# Patient Record
Sex: FEMALE | Race: WHITE | NOT HISPANIC OR LATINO | Employment: FULL TIME | URBAN - NONMETROPOLITAN AREA
[De-identification: names, ages, dates, MRNs, and addresses within clinical notes are randomized per-mention and may not be internally consistent; named-entity substitution may affect disease eponyms.]

---

## 2020-12-29 LAB — HCV AB SER-ACNC: NEGATIVE

## 2021-10-08 DIAGNOSIS — N84.1 POLYP OF CERVIX UTERI: ICD-10-CM

## 2021-10-08 DIAGNOSIS — N93.9 ABNORMAL UTERINE AND VAGINAL BLEEDING, UNSPECIFIED: ICD-10-CM

## 2021-10-08 DIAGNOSIS — N92.0 EXCESSIVE AND FREQUENT MENSTRUATION WITH REGULAR CYCLE: ICD-10-CM

## 2021-10-09 ENCOUNTER — HOSPITAL ENCOUNTER (OUTPATIENT)
Dept: ULTRASOUND IMAGING | Facility: HOSPITAL | Age: 46
Discharge: HOME | End: 2021-10-09
Attending: OBSTETRICS & GYNECOLOGY
Payer: COMMERCIAL

## 2021-10-09 DIAGNOSIS — N93.9 ABNORMAL UTERINE AND VAGINAL BLEEDING, UNSPECIFIED: ICD-10-CM

## 2021-10-09 DIAGNOSIS — N84.1 POLYP OF CERVIX UTERI: ICD-10-CM

## 2021-10-09 DIAGNOSIS — N92.0 EXCESSIVE AND FREQUENT MENSTRUATION WITH REGULAR CYCLE: ICD-10-CM

## 2021-10-09 PROCEDURE — 76856 US EXAM PELVIC COMPLETE: CPT

## 2021-10-09 PROCEDURE — 76830 TRANSVAGINAL US NON-OB: CPT

## 2021-10-27 RX ORDER — SUMATRIPTAN 5 MG/1
1 SPRAY NASAL EVERY 2 HOUR PRN
COMMUNITY

## 2021-10-27 RX ORDER — DROSPIRENONE 4 MG/1
TABLET, FILM COATED ORAL DAILY
COMMUNITY

## 2021-10-27 RX ORDER — TRIAMTERENE AND HYDROCHLOROTHIAZIDE 37.5; 25 MG/1; MG/1
1 TABLET ORAL DAILY
COMMUNITY

## 2021-10-27 RX ORDER — COVID-19 ANTIGEN TEST
1 KIT MISCELLANEOUS AS NEEDED
COMMUNITY

## 2021-10-27 RX ORDER — VENLAFAXINE HYDROCHLORIDE 75 MG/1
75 CAPSULE, EXTENDED RELEASE ORAL DAILY
COMMUNITY
End: 2022-08-03

## 2021-10-27 RX ORDER — FENOFIBRATE 145 MG/1
145 TABLET, COATED ORAL DAILY
COMMUNITY

## 2021-10-27 RX ORDER — ATENOLOL 25 MG/1
25 TABLET ORAL 2 TIMES DAILY
COMMUNITY

## 2021-10-27 RX ORDER — VALACYCLOVIR HYDROCHLORIDE 1 G/1
1000 TABLET, FILM COATED ORAL AS NEEDED
COMMUNITY

## 2021-11-04 ENCOUNTER — ANESTHESIA EVENT (OUTPATIENT)
Dept: PERIOP | Facility: HOSPITAL | Age: 46
End: 2021-11-04
Payer: COMMERCIAL

## 2021-11-05 ENCOUNTER — ANESTHESIA (OUTPATIENT)
Dept: PERIOP | Facility: HOSPITAL | Age: 46
End: 2021-11-05
Payer: COMMERCIAL

## 2021-11-05 ENCOUNTER — HOSPITAL ENCOUNTER (OUTPATIENT)
Facility: HOSPITAL | Age: 46
Setting detail: OUTPATIENT SURGERY
Discharge: HOME | End: 2021-11-05
Attending: OBSTETRICS & GYNECOLOGY | Admitting: OBSTETRICS & GYNECOLOGY
Payer: COMMERCIAL

## 2021-11-05 VITALS
OXYGEN SATURATION: 98 % | SYSTOLIC BLOOD PRESSURE: 152 MMHG | DIASTOLIC BLOOD PRESSURE: 72 MMHG | HEART RATE: 70 BPM | TEMPERATURE: 98.3 F | BODY MASS INDEX: 27.42 KG/M2 | WEIGHT: 149 LBS | RESPIRATION RATE: 18 BRPM | HEIGHT: 62 IN

## 2021-11-05 DIAGNOSIS — N93.9 ABNORMAL UTERINE AND VAGINAL BLEEDING, UNSPECIFIED: ICD-10-CM

## 2021-11-05 PROBLEM — R11.2 PONV (POSTOPERATIVE NAUSEA AND VOMITING): Status: ACTIVE | Noted: 2021-11-05

## 2021-11-05 PROBLEM — I10 HTN (HYPERTENSION): Status: ACTIVE | Noted: 2021-11-05

## 2021-11-05 PROBLEM — R73.03 PREDIABETES: Status: ACTIVE | Noted: 2021-11-05

## 2021-11-05 PROBLEM — Z98.890 PONV (POSTOPERATIVE NAUSEA AND VOMITING): Status: ACTIVE | Noted: 2021-11-05

## 2021-11-05 PROBLEM — E78.5 HYPERLIPIDEMIA: Status: ACTIVE | Noted: 2021-11-05

## 2021-11-05 LAB
EXT PREGNANCY TEST URINE: NEGATIVE
EXT. CONTROL: NORMAL
HCG SERPL QL: NEGATIVE

## 2021-11-05 PROCEDURE — 84703 CHORIONIC GONADOTROPIN ASSAY: CPT | Performed by: OBSTETRICS & GYNECOLOGY

## 2021-11-05 PROCEDURE — 81025 URINE PREGNANCY TEST: CPT | Performed by: OBSTETRICS & GYNECOLOGY

## 2021-11-05 PROCEDURE — 88305 TISSUE EXAM BY PATHOLOGIST: CPT | Performed by: PATHOLOGY

## 2021-11-05 RX ORDER — SODIUM CHLORIDE 9 MG/ML
125 INJECTION, SOLUTION INTRAVENOUS CONTINUOUS
Status: DISCONTINUED | OUTPATIENT
Start: 2021-11-05 | End: 2021-11-07 | Stop reason: HOSPADM

## 2021-11-05 RX ORDER — PROPOFOL 10 MG/ML
INJECTION, EMULSION INTRAVENOUS AS NEEDED
Status: DISCONTINUED | OUTPATIENT
Start: 2021-11-05 | End: 2021-11-05

## 2021-11-05 RX ORDER — FENTANYL CITRATE/PF 50 MCG/ML
25 SYRINGE (ML) INJECTION
Status: DISCONTINUED | OUTPATIENT
Start: 2021-11-05 | End: 2021-11-05 | Stop reason: HOSPADM

## 2021-11-05 RX ORDER — MIDAZOLAM HYDROCHLORIDE 2 MG/2ML
INJECTION, SOLUTION INTRAMUSCULAR; INTRAVENOUS AS NEEDED
Status: DISCONTINUED | OUTPATIENT
Start: 2021-11-05 | End: 2021-11-05

## 2021-11-05 RX ORDER — LIDOCAINE HYDROCHLORIDE 10 MG/ML
INJECTION, SOLUTION EPIDURAL; INFILTRATION; INTRACAUDAL; PERINEURAL AS NEEDED
Status: DISCONTINUED | OUTPATIENT
Start: 2021-11-05 | End: 2021-11-05

## 2021-11-05 RX ORDER — KETOROLAC TROMETHAMINE 30 MG/ML
INJECTION, SOLUTION INTRAMUSCULAR; INTRAVENOUS AS NEEDED
Status: DISCONTINUED | OUTPATIENT
Start: 2021-11-05 | End: 2021-11-05

## 2021-11-05 RX ORDER — EPHEDRINE SULFATE 50 MG/ML
INJECTION INTRAVENOUS AS NEEDED
Status: DISCONTINUED | OUTPATIENT
Start: 2021-11-05 | End: 2021-11-05

## 2021-11-05 RX ORDER — ONDANSETRON 2 MG/ML
4 INJECTION INTRAMUSCULAR; INTRAVENOUS EVERY 6 HOURS PRN
Status: DISCONTINUED | OUTPATIENT
Start: 2021-11-05 | End: 2021-11-07 | Stop reason: HOSPADM

## 2021-11-05 RX ORDER — ONDANSETRON 2 MG/ML
INJECTION INTRAMUSCULAR; INTRAVENOUS AS NEEDED
Status: DISCONTINUED | OUTPATIENT
Start: 2021-11-05 | End: 2021-11-05

## 2021-11-05 RX ORDER — IBUPROFEN 600 MG/1
600 TABLET ORAL EVERY 6 HOURS PRN
Status: DISCONTINUED | OUTPATIENT
Start: 2021-11-05 | End: 2021-11-07 | Stop reason: HOSPADM

## 2021-11-05 RX ORDER — DEXAMETHASONE SODIUM PHOSPHATE 10 MG/ML
INJECTION, SOLUTION INTRAMUSCULAR; INTRAVENOUS AS NEEDED
Status: DISCONTINUED | OUTPATIENT
Start: 2021-11-05 | End: 2021-11-05

## 2021-11-05 RX ORDER — MAGNESIUM HYDROXIDE 1200 MG/15ML
LIQUID ORAL AS NEEDED
Status: DISCONTINUED | OUTPATIENT
Start: 2021-11-05 | End: 2021-11-05 | Stop reason: HOSPADM

## 2021-11-05 RX ORDER — CEFAZOLIN SODIUM 2 G/50ML
2000 SOLUTION INTRAVENOUS ONCE
Status: COMPLETED | OUTPATIENT
Start: 2021-11-05 | End: 2021-11-05

## 2021-11-05 RX ORDER — FENTANYL CITRATE 50 UG/ML
INJECTION, SOLUTION INTRAMUSCULAR; INTRAVENOUS AS NEEDED
Status: DISCONTINUED | OUTPATIENT
Start: 2021-11-05 | End: 2021-11-05

## 2021-11-05 RX ORDER — MEPERIDINE HYDROCHLORIDE 25 MG/ML
25 INJECTION INTRAMUSCULAR; INTRAVENOUS; SUBCUTANEOUS ONCE AS NEEDED
Status: DISCONTINUED | OUTPATIENT
Start: 2021-11-05 | End: 2021-11-05 | Stop reason: HOSPADM

## 2021-11-05 RX ADMIN — FENTANYL CITRATE 50 MCG: 50 INJECTION, SOLUTION INTRAMUSCULAR; INTRAVENOUS at 09:00

## 2021-11-05 RX ADMIN — DEXAMETHASONE SODIUM PHOSPHATE 4 MG: 10 INJECTION, SOLUTION INTRAMUSCULAR; INTRAVENOUS at 09:00

## 2021-11-05 RX ADMIN — EPHEDRINE SULFATE 15 MG: 50 INJECTION, SOLUTION INTRAVENOUS at 09:14

## 2021-11-05 RX ADMIN — SODIUM CHLORIDE 125 ML/HR: 0.9 INJECTION, SOLUTION INTRAVENOUS at 07:21

## 2021-11-05 RX ADMIN — EPHEDRINE SULFATE 10 MG: 50 INJECTION, SOLUTION INTRAVENOUS at 09:07

## 2021-11-05 RX ADMIN — LIDOCAINE HYDROCHLORIDE 50 MG: 10 INJECTION, SOLUTION EPIDURAL; INFILTRATION; INTRACAUDAL; PERINEURAL at 09:00

## 2021-11-05 RX ADMIN — ONDANSETRON 4 MG: 2 INJECTION INTRAMUSCULAR; INTRAVENOUS at 09:00

## 2021-11-05 RX ADMIN — PROPOFOL 180 MG: 10 INJECTION, EMULSION INTRAVENOUS at 09:00

## 2021-11-05 RX ADMIN — MIDAZOLAM HYDROCHLORIDE 2 MG: 1 INJECTION, SOLUTION INTRAMUSCULAR; INTRAVENOUS at 08:51

## 2021-11-05 RX ADMIN — KETOROLAC TROMETHAMINE 30 MG: 30 INJECTION, SOLUTION INTRAMUSCULAR at 09:23

## 2021-11-05 RX ADMIN — EPHEDRINE SULFATE 10 MG: 50 INJECTION, SOLUTION INTRAVENOUS at 09:05

## 2021-11-05 RX ADMIN — FENTANYL CITRATE 50 MCG: 50 INJECTION, SOLUTION INTRAMUSCULAR; INTRAVENOUS at 09:18

## 2021-11-05 RX ADMIN — CEFAZOLIN SODIUM 2000 MG: 2 SOLUTION INTRAVENOUS at 08:50

## 2022-07-25 ENCOUNTER — ANESTHESIA EVENT (OUTPATIENT)
Dept: PERIOP | Facility: HOSPITAL | Age: 47
End: 2022-07-25
Payer: COMMERCIAL

## 2022-07-27 RX ORDER — HYDROCHLOROTHIAZIDE 25 MG/1
25 TABLET ORAL DAILY
COMMUNITY

## 2022-07-27 RX ORDER — TOPIRAMATE 50 MG/1
50 TABLET, FILM COATED ORAL EVERY 12 HOURS SCHEDULED
COMMUNITY

## 2022-07-27 RX ORDER — FLUTICASONE PROPIONATE 50 MCG
1 SPRAY, SUSPENSION (ML) NASAL DAILY
COMMUNITY

## 2022-07-27 RX ORDER — LISINOPRIL 40 MG/1
40 TABLET ORAL DAILY
COMMUNITY

## 2022-07-27 RX ORDER — AMLODIPINE BESYLATE 5 MG/1
5 TABLET ORAL DAILY
COMMUNITY

## 2022-07-27 NOTE — PRE-PROCEDURE INSTRUCTIONS
Pre-Surgery Instructions:   Medication Instructions    amLODIPine (NORVASC) 5 mg tablet Take day of surgery   atenolol (TENORMIN) 25 mg tablet Take day of surgery   Calcium-Magnesium-Vitamin D (CALCIUM 1200+D3 PO) Stop taking 7 days prior to surgery   fenofibrate (TRICOR) 145 mg tablet Take day of surgery   fluticasone (FLONASE) 50 mcg/act nasal spray Take day of surgery   hydrochlorothiazide (HYDRODIURIL) 25 mg tablet Hold day of surgery   lisinopril (ZESTRIL) 40 mg tablet Hold day of surgery   Multiple Vitamins-Minerals (MULTIVITAMIN WOMEN PO) Stop taking 7 days prior to surgery   Naproxen Sodium 220 MG CAPS Stop taking 3 days prior to surgery   SUMAtriptan (IMITREX) 5 MG/ACT nasal spray Uses PRN- OK to take day of surgery    topiramate (TOPAMAX) 50 MG tablet Take day of surgery   valACYclovir (VALTREX) 1,000 mg tablet Uses PRN- OK to take day of surgery   See above    Pt was instructed to take am meds with a small sip of water    Tylenol ok for pain prn

## 2022-08-02 NOTE — H&P
HISTORY AND PHYSICAL       Subjective      Ankur Fleming is a 55year old female  Chief Complaint   Patient presents with    Consultation            HPI:     This is a 12-year-old  011 presents the office today for consultation  Patient has a history of D&C, hysteroscopy, polypectomy secondary to abnormal uterine bleeding  Since the procedure, her periods have been irregular  And have increased with the heaviness    Also having a lot of cramps           PMH:          Past Medical History:   Diagnosis Date    Chronic diarrhea      Glucose intolerance (impaired glucose tolerance)      HTN, goal below 130/80              Past Surgical History:   Procedure Laterality Date     DELIVERY       COLONOSCOPY, DIAGNOSTIC (RECTUM) N/A 2018     COLONOSCOPY FLEXIBLE PROXIMAL DIAGNOSTIC performed by Mila Lombard, MD at [address] [place] [address], EACH, REPAIR Right 2016     OPEN TREATMENT ARTICULAR FINGER FRACTURE performed by Michael Osorio MD at [address] [place] [address] NAIL BED, PERMANENT REMOVAL Right 2016     EXCISION NAIL PARTIAL OR COMPLETE INGROWN performed by Michael Osorio MD at [place] [address]        Shoulder Surgery, right - scope            Family History   Problem Relation Age of Onset    Cancer Aunt (Unspecified)           maternal - breast    Breast Cancer Aunt (Unspecified)      Cancer Grandmother (Paternal)            stomach    Cancer Grandfather (Maternal)           leukemia    Cancer Aunt (Unspecified)           paternal - pancreatic    Cancer Uncle (Unspecified)           paternal - pancreatic    Heart failure Mother      Diabetes Mother      Stroke Father      Diabetes Father      Diabetes Grandmother (Maternal)      Stroke Grandmother (Maternal)      No Known Problems Grandfather (Paternal)      Ovarian cancer Cousin (Maternal)                  Current Outpatient Medications   Medication Sig Dispense Refill    Calcium Carb-Cholecalciferol (CALCIUM + D3) 600-200 MG-UNIT per tablet Take 1 Tab by mouth 2 times a day         fluticasone (FLONASE ALLERGY RELIEF) 50 MCG/ACT nasal spray Administer 1 Spray into nostril as needed         Multiple Vitamins-Calcium (ONE-A-DAY WOMENS FORMULA) Tablet Take 1 Tab by mouth daily         Glucose Blood (ONETOUCH VERIO) STRP Use to check blood sugar as needed  Dx R73 03 100 Strip 11    Fenofibrate 145 MG Oral Tablet (Tricor) Take 1 Tab by mouth daily  90 Tab 3    Triamterene-HCTZ 37 5-25 MG Oral Tablet ((Maxzide-25)) Take 1 Tab by mouth daily  90 Tab 3    Atenolol 50 MG Oral Tablet (Tenormin) Take 0 5 Tabs by mouth 2 times a day  Indications: 1/2 tab twice daily 90 Tab 3    SUMAtriptan 5 MG/ACT Nasal Solution (Imitrex) USE 2 SPRAYS IN NOSTRIL AT ONSET OF HEADACHE  MAY REPEAT IN 2 HOURS IF NECESSARY 12 Each 19    valACYclovir HCl 1 GM Oral Tablet (Valtrex) TAKE 2 TABLETS EVERY 12 HOURS FOR ONE DAY AT THE ONSET OF A COLD SORE 12 Tab 20    Venlafaxine HCl ER 75 MG Oral Capsule Extended Release 24 Hour (Effexor XR) TAKE 1 CAPSULE DAILY  START ONCE 37 5 MG CAPSULE ARE COMPLETED 90 Capsule 3      No current facility-administered medications for this visit               Review of patient's allergies indicates: Allergen Reactions    Latex Hives    Pneumococcal Vaccines           ROS:  Review of Systems   Constitutional: Negative for activity change, appetite change, fatigue and fever  HENT: Negative for congestion, facial swelling, hearing loss, sinus pressure and sinus pain  Eyes: Negative for discharge and itching  Respiratory: Negative for apnea and chest tightness  Cardiovascular: Negative for chest pain and leg swelling  Gastrointestinal: Negative for abdominal distention, abdominal pain and nausea  Endocrine: Negative for cold intolerance and heat intolerance     Genitourinary:  Positive for pain in bleeding  Musculoskeletal: Negative for arthralgias, back pain and gait problem  Skin: Negative for color change and pallor  Allergic/Immunologic: Negative for environmental allergies  Neurological: Negative for dizziness, facial asymmetry and numbness  Psychiatric/Behavioral: Negative for agitation, behavioral problems and suicidal ideas                   Objective      /84   Pulse 90   Temp 36 7 °C (98 °F) (Infrared )   Resp 20   Ht 1 524 m (5')   Wt 70 5 kg (155 lb 6 4 oz)   LMP 04/02/2022 (Approximate)   SpO2 98%   BMI 30 35 kg/m²   BSA 1 73 m²      Physical Exam:  Physical Exam  Constitutional:       General: She is not in acute distress  Appearance: She is not ill-appearing or toxic-appearing  HENT:      Head: Normocephalic and atraumatic  Nose: Nose normal       Mouth/Throat:      Mouth: Mucous membranes are moist      Eyes:      Conjunctiva/sclera: Conjunctivae normal       Pupils: Pupils are equal, round, and reactive to light  Abdominal:      Palpations: Abdomen is soft  Tenderness: There is no abdominal tenderness  There is no guarding       Genitourinary:     General: Normal vulva  Vagina: No vaginal discharge  External exam:  No rashes, lumps or lesions noted  Internal exam:   No cervical motion tenderness  Anteverted uterus  Negative for adnexal tenderness  No adnexal masses palpated  No vaginal bleeding noted  No abnormal discharge noted    Musculoskeletal:         General: No swelling or tenderness       Neurological:      Mental Status: She is alert and oriented to person, place, and time  Psychiatric:         Mood and Affect: Mood normal          Behavior: Behavior normal                ASSESSMENT/PLAN:  Cassondra Severin was seen today for consultation      Diagnoses and all orders for this visit:     Abnormal uterine bleeding (AUB)  -     CBC WITH WBC DIFFERENTIAL     1  Abnormal uterine bleeding  -patient did not have any improvement from the hysteroscopy, D&C, polypectomy  -options were discussed    From conservative management such as birth control, Mirena IUD versus surgical management  -risks, benefits and alternatives were discussed with the patient  Ultimately opts for definitive management with a hysterectomy  -all questions were answered  Consents were signed   -patient will be for diagnostic laparoscopy, robotic assisted total hysterectomy, bilateral salpingectomy, possible open  -will schedule at [address]

## 2022-08-03 ENCOUNTER — ANESTHESIA (OUTPATIENT)
Dept: PERIOP | Facility: HOSPITAL | Age: 47
End: 2022-08-03
Payer: COMMERCIAL

## 2022-08-03 ENCOUNTER — HOSPITAL ENCOUNTER (OUTPATIENT)
Facility: HOSPITAL | Age: 47
Setting detail: OUTPATIENT SURGERY
Discharge: HOME | End: 2022-08-03
Attending: OBSTETRICS & GYNECOLOGY | Admitting: OBSTETRICS & GYNECOLOGY
Payer: COMMERCIAL

## 2022-08-03 VITALS
HEART RATE: 81 BPM | DIASTOLIC BLOOD PRESSURE: 63 MMHG | SYSTOLIC BLOOD PRESSURE: 115 MMHG | TEMPERATURE: 97.6 F | WEIGHT: 149 LBS | RESPIRATION RATE: 18 BRPM | HEIGHT: 62 IN | BODY MASS INDEX: 27.42 KG/M2 | OXYGEN SATURATION: 100 %

## 2022-08-03 DIAGNOSIS — N93.9 ABNORMAL UTERINE AND VAGINAL BLEEDING, UNSPECIFIED: ICD-10-CM

## 2022-08-03 PROBLEM — R51.9 HEADACHE: Status: ACTIVE | Noted: 2022-08-03

## 2022-08-03 LAB
ABO GROUP BLD: NORMAL
ABO GROUP BLD: NORMAL
BLD GP AB SCN SERPL QL: NEGATIVE
EXT PREGNANCY TEST URINE: NEGATIVE
EXT. CONTROL: NORMAL
RH BLD: POSITIVE
RH BLD: POSITIVE
SPECIMEN EXPIRATION DATE: NORMAL

## 2022-08-03 PROCEDURE — 88342 IMHCHEM/IMCYTCHM 1ST ANTB: CPT | Performed by: PATHOLOGY

## 2022-08-03 PROCEDURE — 88307 TISSUE EXAM BY PATHOLOGIST: CPT | Performed by: PATHOLOGY

## 2022-08-03 PROCEDURE — 81025 URINE PREGNANCY TEST: CPT | Performed by: OBSTETRICS & GYNECOLOGY

## 2022-08-03 PROCEDURE — 86900 BLOOD TYPING SEROLOGIC ABO: CPT | Performed by: ANESTHESIOLOGY

## 2022-08-03 PROCEDURE — 86850 RBC ANTIBODY SCREEN: CPT | Performed by: ANESTHESIOLOGY

## 2022-08-03 PROCEDURE — 86901 BLOOD TYPING SEROLOGIC RH(D): CPT | Performed by: ANESTHESIOLOGY

## 2022-08-03 PROCEDURE — 88341 IMHCHEM/IMCYTCHM EA ADD ANTB: CPT | Performed by: PATHOLOGY

## 2022-08-03 PROCEDURE — 58571 TLH W/T/O 250 G OR LESS: CPT

## 2022-08-03 RX ORDER — BUPIVACAINE HYDROCHLORIDE 2.5 MG/ML
INJECTION, SOLUTION EPIDURAL; INFILTRATION; INTRACAUDAL AS NEEDED
Status: DISCONTINUED | OUTPATIENT
Start: 2022-08-03 | End: 2022-08-03 | Stop reason: HOSPADM

## 2022-08-03 RX ORDER — PROPOFOL 10 MG/ML
INJECTION, EMULSION INTRAVENOUS AS NEEDED
Status: DISCONTINUED | OUTPATIENT
Start: 2022-08-03 | End: 2022-08-03

## 2022-08-03 RX ORDER — ROCURONIUM BROMIDE 10 MG/ML
INJECTION, SOLUTION INTRAVENOUS AS NEEDED
Status: DISCONTINUED | OUTPATIENT
Start: 2022-08-03 | End: 2022-08-03

## 2022-08-03 RX ORDER — KETOROLAC TROMETHAMINE 30 MG/ML
INJECTION, SOLUTION INTRAMUSCULAR; INTRAVENOUS AS NEEDED
Status: DISCONTINUED | OUTPATIENT
Start: 2022-08-03 | End: 2022-08-03

## 2022-08-03 RX ORDER — SODIUM CHLORIDE 9 MG/ML
125 INJECTION, SOLUTION INTRAVENOUS CONTINUOUS
Status: DISCONTINUED | OUTPATIENT
Start: 2022-08-03 | End: 2022-08-03 | Stop reason: HOSPADM

## 2022-08-03 RX ORDER — NEOSTIGMINE METHYLSULFATE 1 MG/ML
INJECTION INTRAVENOUS AS NEEDED
Status: DISCONTINUED | OUTPATIENT
Start: 2022-08-03 | End: 2022-08-03

## 2022-08-03 RX ORDER — METOCLOPRAMIDE HYDROCHLORIDE 5 MG/ML
10 INJECTION INTRAMUSCULAR; INTRAVENOUS ONCE
Status: COMPLETED | OUTPATIENT
Start: 2022-08-03 | End: 2022-08-03

## 2022-08-03 RX ORDER — CEFAZOLIN SODIUM 1 G/3ML
INJECTION, POWDER, FOR SOLUTION INTRAMUSCULAR; INTRAVENOUS AS NEEDED
Status: DISCONTINUED | OUTPATIENT
Start: 2022-08-03 | End: 2022-08-03

## 2022-08-03 RX ORDER — FENTANYL CITRATE 50 UG/ML
INJECTION, SOLUTION INTRAMUSCULAR; INTRAVENOUS AS NEEDED
Status: DISCONTINUED | OUTPATIENT
Start: 2022-08-03 | End: 2022-08-03

## 2022-08-03 RX ORDER — ONDANSETRON 2 MG/ML
4 INJECTION INTRAMUSCULAR; INTRAVENOUS ONCE AS NEEDED
Status: COMPLETED | OUTPATIENT
Start: 2022-08-03 | End: 2022-08-03

## 2022-08-03 RX ORDER — FENTANYL CITRATE/PF 50 MCG/ML
50 SYRINGE (ML) INJECTION
Status: DISCONTINUED | OUTPATIENT
Start: 2022-08-03 | End: 2022-08-03 | Stop reason: HOSPADM

## 2022-08-03 RX ORDER — MAGNESIUM HYDROXIDE 1200 MG/15ML
LIQUID ORAL AS NEEDED
Status: DISCONTINUED | OUTPATIENT
Start: 2022-08-03 | End: 2022-08-03 | Stop reason: HOSPADM

## 2022-08-03 RX ORDER — DIPHENHYDRAMINE HYDROCHLORIDE 50 MG/ML
12.5 INJECTION INTRAMUSCULAR; INTRAVENOUS ONCE
Status: COMPLETED | OUTPATIENT
Start: 2022-08-03 | End: 2022-08-03

## 2022-08-03 RX ORDER — SODIUM CHLORIDE, SODIUM LACTATE, POTASSIUM CHLORIDE, CALCIUM CHLORIDE 600; 310; 30; 20 MG/100ML; MG/100ML; MG/100ML; MG/100ML
INJECTION, SOLUTION INTRAVENOUS CONTINUOUS PRN
Status: DISCONTINUED | OUTPATIENT
Start: 2022-08-03 | End: 2022-08-03

## 2022-08-03 RX ORDER — IBUPROFEN 600 MG/1
600 TABLET ORAL EVERY 6 HOURS PRN
Status: DISCONTINUED | OUTPATIENT
Start: 2022-08-03 | End: 2022-08-03 | Stop reason: HOSPADM

## 2022-08-03 RX ORDER — GLYCOPYRROLATE 0.2 MG/ML
INJECTION INTRAMUSCULAR; INTRAVENOUS AS NEEDED
Status: DISCONTINUED | OUTPATIENT
Start: 2022-08-03 | End: 2022-08-03

## 2022-08-03 RX ORDER — ONDANSETRON 2 MG/ML
4 INJECTION INTRAMUSCULAR; INTRAVENOUS EVERY 6 HOURS PRN
Status: DISCONTINUED | OUTPATIENT
Start: 2022-08-03 | End: 2022-08-03 | Stop reason: HOSPADM

## 2022-08-03 RX ORDER — METRONIDAZOLE 500 MG/100ML
500 INJECTION, SOLUTION INTRAVENOUS EVERY 8 HOURS
Status: DISCONTINUED | OUTPATIENT
Start: 2022-08-03 | End: 2022-08-03

## 2022-08-03 RX ORDER — SODIUM CHLORIDE, SODIUM LACTATE, POTASSIUM CHLORIDE, CALCIUM CHLORIDE 600; 310; 30; 20 MG/100ML; MG/100ML; MG/100ML; MG/100ML
100 INJECTION, SOLUTION INTRAVENOUS CONTINUOUS
Status: DISCONTINUED | OUTPATIENT
Start: 2022-08-03 | End: 2022-08-03 | Stop reason: HOSPADM

## 2022-08-03 RX ORDER — HYDROMORPHONE HCL IN WATER/PF 6 MG/30 ML
0.2 PATIENT CONTROLLED ANALGESIA SYRINGE INTRAVENOUS
Status: DISCONTINUED | OUTPATIENT
Start: 2022-08-03 | End: 2022-08-03 | Stop reason: HOSPADM

## 2022-08-03 RX ORDER — DEXAMETHASONE SODIUM PHOSPHATE 10 MG/ML
INJECTION, SOLUTION INTRAMUSCULAR; INTRAVENOUS AS NEEDED
Status: DISCONTINUED | OUTPATIENT
Start: 2022-08-03 | End: 2022-08-03

## 2022-08-03 RX ORDER — DIPHENHYDRAMINE HYDROCHLORIDE 50 MG/ML
INJECTION INTRAMUSCULAR; INTRAVENOUS
Status: COMPLETED
Start: 2022-08-03 | End: 2022-08-03

## 2022-08-03 RX ORDER — PROPOFOL 10 MG/ML
INJECTION, EMULSION INTRAVENOUS CONTINUOUS PRN
Status: DISCONTINUED | OUTPATIENT
Start: 2022-08-03 | End: 2022-08-03

## 2022-08-03 RX ORDER — ONDANSETRON 2 MG/ML
INJECTION INTRAMUSCULAR; INTRAVENOUS AS NEEDED
Status: DISCONTINUED | OUTPATIENT
Start: 2022-08-03 | End: 2022-08-03

## 2022-08-03 RX ORDER — ACETAMINOPHEN 325 MG/1
975 TABLET ORAL EVERY 6 HOURS PRN
Status: DISCONTINUED | OUTPATIENT
Start: 2022-08-03 | End: 2022-08-03 | Stop reason: HOSPADM

## 2022-08-03 RX ORDER — MIDAZOLAM HYDROCHLORIDE 2 MG/2ML
INJECTION, SOLUTION INTRAMUSCULAR; INTRAVENOUS AS NEEDED
Status: DISCONTINUED | OUTPATIENT
Start: 2022-08-03 | End: 2022-08-03

## 2022-08-03 RX ORDER — CEFAZOLIN SODIUM 2 G/50ML
2000 SOLUTION INTRAVENOUS ONCE
Status: COMPLETED | OUTPATIENT
Start: 2022-08-03 | End: 2022-08-03

## 2022-08-03 RX ADMIN — FENTANYL CITRATE 50 MCG: 0.05 INJECTION, SOLUTION INTRAMUSCULAR; INTRAVENOUS at 08:25

## 2022-08-03 RX ADMIN — FENTANYL CITRATE 25 MCG: 0.05 INJECTION, SOLUTION INTRAMUSCULAR; INTRAVENOUS at 12:00

## 2022-08-03 RX ADMIN — DIPHENHYDRAMINE HYDROCHLORIDE 12.5 MG: 50 INJECTION INTRAMUSCULAR; INTRAVENOUS at 12:51

## 2022-08-03 RX ADMIN — METOCLOPRAMIDE HYDROCHLORIDE 10 MG: 5 INJECTION INTRAMUSCULAR; INTRAVENOUS at 13:48

## 2022-08-03 RX ADMIN — SODIUM CHLORIDE, SODIUM LACTATE, POTASSIUM CHLORIDE, AND CALCIUM CHLORIDE: .6; .31; .03; .02 INJECTION, SOLUTION INTRAVENOUS at 07:35

## 2022-08-03 RX ADMIN — FENTANYL CITRATE 25 MCG: 0.05 INJECTION, SOLUTION INTRAMUSCULAR; INTRAVENOUS at 11:29

## 2022-08-03 RX ADMIN — MIDAZOLAM 2 MG: 1 INJECTION INTRAMUSCULAR; INTRAVENOUS at 07:27

## 2022-08-03 RX ADMIN — GLYCOPYRROLATE 0.4 MG: 0.2 INJECTION, SOLUTION INTRAMUSCULAR; INTRAVENOUS at 11:34

## 2022-08-03 RX ADMIN — DIPHENHYDRAMINE HYDROCHLORIDE 12.5 MG: 50 INJECTION, SOLUTION INTRAMUSCULAR; INTRAVENOUS at 12:51

## 2022-08-03 RX ADMIN — ONDANSETRON 4 MG: 2 INJECTION INTRAMUSCULAR; INTRAVENOUS at 12:24

## 2022-08-03 RX ADMIN — PROPOFOL 200 MG: 10 INJECTION, EMULSION INTRAVENOUS at 07:30

## 2022-08-03 RX ADMIN — PROPOFOL 120 MCG/KG/MIN: 10 INJECTION, EMULSION INTRAVENOUS at 07:34

## 2022-08-03 RX ADMIN — KETOROLAC TROMETHAMINE 30 MG: 30 INJECTION, SOLUTION INTRAMUSCULAR at 11:32

## 2022-08-03 RX ADMIN — IBUPROFEN 600 MG: 600 TABLET ORAL at 14:39

## 2022-08-03 RX ADMIN — SODIUM CHLORIDE, SODIUM LACTATE, POTASSIUM CHLORIDE, CALCIUM CHLORIDE: 600; 310; 30; 20 INJECTION, SOLUTION INTRAVENOUS at 07:27

## 2022-08-03 RX ADMIN — ROCURONIUM BROMIDE 20 MG: 50 INJECTION, SOLUTION INTRAVENOUS at 08:31

## 2022-08-03 RX ADMIN — NEOSTIGMINE METHYLSULFATE 3 MG: 1 INJECTION, SOLUTION INTRAVENOUS at 11:34

## 2022-08-03 RX ADMIN — CEFAZOLIN 2000 MG: 1 INJECTION, POWDER, FOR SOLUTION INTRAMUSCULAR; INTRAVENOUS at 11:24

## 2022-08-03 RX ADMIN — FENTANYL CITRATE 50 MCG: 0.05 INJECTION, SOLUTION INTRAMUSCULAR; INTRAVENOUS at 09:56

## 2022-08-03 RX ADMIN — ONDANSETRON 4 MG: 2 INJECTION INTRAMUSCULAR; INTRAVENOUS at 10:47

## 2022-08-03 RX ADMIN — FENTANYL CITRATE 50 MCG: 0.05 INJECTION, SOLUTION INTRAMUSCULAR; INTRAVENOUS at 10:35

## 2022-08-03 RX ADMIN — ROCURONIUM BROMIDE 10 MG: 50 INJECTION, SOLUTION INTRAVENOUS at 09:56

## 2022-08-03 RX ADMIN — FENTANYL CITRATE 100 MCG: 0.05 INJECTION, SOLUTION INTRAMUSCULAR; INTRAVENOUS at 07:30

## 2022-08-03 RX ADMIN — METRONIDAZOLE: 500 INJECTION, SOLUTION INTRAVENOUS at 07:39

## 2022-08-03 RX ADMIN — CEFAZOLIN SODIUM 2000 MG: 2 SOLUTION INTRAVENOUS at 07:27

## 2022-08-03 RX ADMIN — ROCURONIUM BROMIDE 20 MG: 50 INJECTION, SOLUTION INTRAVENOUS at 09:16

## 2022-08-03 RX ADMIN — METHYLENE BLUE 50 MG: 5 INJECTION INTRAVENOUS at 10:21

## 2022-08-03 RX ADMIN — ROCURONIUM BROMIDE 50 MG: 50 INJECTION, SOLUTION INTRAVENOUS at 07:30

## 2022-08-03 RX ADMIN — ROCURONIUM BROMIDE 10 MG: 50 INJECTION, SOLUTION INTRAVENOUS at 10:30

## 2022-08-03 RX ADMIN — DEXAMETHASONE SODIUM PHOSPHATE 10 MG: 10 INJECTION, SOLUTION INTRAMUSCULAR; INTRAVENOUS at 07:55

## 2022-08-03 NOTE — DISCHARGE INSTRUCTIONS
Please take tylenol/ advil as needed   Percocet was sent to pharmacy  No sex for 8 weeks  Please schedule an appointment to see me in the office in 3 weeks  No lifting more than 30 lbs for 6 weeks

## 2022-08-03 NOTE — DISCHARGE SUMMARY
Discharge Summary - Juanis Early 55 y o  female MRN: [id number]    Unit/Bed#: OR POOL Encounter: [id number]    Admission Date:     Admitting Diagnosis: Abnormal uterine and vaginal bleeding, unspecified [N93 9]        Procedures Performed: diagnostic laparoscopy, robotic assisted total hysterectomy, b/l salpingectomy    Summary of Hospital Course: Patient was here for scheduled surgery  No issues and discharged home the same day    Significant Findings, Care, Treatment and Services Provided: none    Complications: none    Discharge Diagnosis: AUB    Medical Problems                   Condition at Discharge: good         Discharge instructions/Information to patient and family:   See after visit summary for information provided to patient and family  Provisions for Follow-Up Care:  See after visit summary for information related to follow-up care and any pertinent home health orders  PCP: Foreign Mcqueen DO    Disposition: Home    Planned Readmission: No      Discharge Statement   I spent 15 minutes discharging the patient  This time was spent on the day of discharge  I had direct contact with the patient on the day of discharge  Additional documentation is required if more than 30 minutes were spent on discharge  Discharge Medications:  See after visit summary for reconciled discharge medications provided to patient and family

## 2022-08-03 NOTE — ANESTHESIA POSTPROCEDURE EVALUATION
Post-Op Assessment Note    CV Status:  Stable    Pain management: satisfactory to patient     Mental Status:  Sleepy and arousable   Hydration Status:  Stable   PONV Controlled:  None   Airway Patency:  Patent  Airway: intubated      Post Op Vitals Reviewed: Yes      Staff: CRNA         No complications documented      /63 (08/03/22 1203)    Temp 98 3 °F (36 8 °C) (08/03/22 1203)    Pulse 80 (08/03/22 1203)   Resp 20 (08/03/22 1203)    SpO2 99 % (08/03/22 1203)

## 2022-08-03 NOTE — ANESTHESIA PREPROCEDURE EVALUATION
Procedure:  DIAGNOSTIC LAPAROSCOPY, ROBOTIC TOTAL LAPAROSCOPIC HYSTERECTOMY, BILATERAL SALPINGECTOMY, ANY OTHER INDICATED PROCEDURE (N/A Abdomen)  POSSIBLE OPEN (N/A Abdomen)    Relevant Problems   ANESTHESIA   (+) PONV (postoperative nausea and vomiting)      CARDIO   (+) HTN (hypertension)   (+) Hyperlipidemia      NEURO/PSYCH   (+) Headache        Physical Exam    Airway    Mallampati score: II  TM Distance: >3 FB  Neck ROM: full     Dental       Cardiovascular  Cardiovascular exam normal    Pulmonary  Pulmonary exam normal     Other Findings        Anesthesia Plan  ASA Score- 2     Anesthesia Type- general with ASA Monitors  Additional Monitors:   Airway Plan: ETT  Plan Factors-    Chart reviewed  Patient summary reviewed  Induction- intravenous  Postoperative Plan-     Informed Consent- Anesthetic plan and risks discussed with patient  I personally reviewed this patient with the CRNA  Discussed and agreed on the Anesthesia Plan with the CRNA  Kris Peacock

## 2022-08-03 NOTE — OP NOTE
OPERATIVE REPORT  PATIENT NAME: Juanis Early    :  1975  MRN: [id number]  Pt Location: [facility] OR ROOM 01    SURGERY DATE: 8/3/2022    Surgeon(s) and Role:      Durga Dudley DO - Primary      Keke Mclain PA-C - Assisting    Preop Diagnosis:  Abnormal uterine and vaginal bleeding, unspecified [N93 9]    Post-Op Diagnosis Codes:      Abnormal uterine and vaginal bleeding, unspecified [N93 9]    Procedure(s) (LRB):  DIAGNOSTIC LAPAROSCOPY, ROBOTIC LAPAROSCOPIC HYSTERECTOMY, BILATERAL SALPINGECTOMY (N/A)    Specimen(s):  ID Type Source Tests Collected by Time Destination   1 : uterus, bilateral fallopian tubes and cervix Tissue Uterus TISSUE EXAM Durga Dudley DO 8/3/2022 10:18 AM        Estimated Blood Loss:   60cc    Drains:  [REMOVED] Urethral Catheter Non-latex 16 Fr  (Removed)   Number of days: 0       Anesthesia Type:   General    Operative Indications:  Abnormal uterine and vaginal bleeding, unspecified [N93 9]      Operative Findings:  Normal uterus, tubes and ovaries  Uterus less than 419K    Complications:   None    Procedure and Technique:  General anesthesia was induced and the patient was placed in the dorsal lithotomy position  The abdomen, perineum, and vagina were prepped and draped in the usual fashion  A kinney catheter was inserted into the bladder and attached to straight drainage  After the initial preparation, the procedure commenced at the vagina  With a weighted speculum in place to visualize the cervix, Uertine manipulator was placed    Following infiltration with Lidocaine, an infraumbilical incision was made and direct entry was made with 5 mm trocar  With CO2 infiltration, a pneumoperitoneum of 15 mmHg was created  A 5 mm trocar was then passed through the same incision and the laparoscope was then inserted through the trocar sleeve   Visualization of the peritoneal cavity was then obtained and a brief inspection did not reveal any signs of complications from entry  Under direct observation,1  5mm port was inserted  3 more 8mm robotic ports were placed approximately 8 cm apart  Once the placement of the ports was complete, the robot was docked, and the actual laparoscopic procedure began  Beginning on the left side and distally along the length of the fallopean tube, the mesosalpinx was exposed by lifting the (tube/ovary) up towards the anterior abdominal wall  Retroperitoneal space was entered and dissection was done along the anterior and posterior broard ligament using the vesisealer device  Ureters were carefully visualized  The round ligament was then ligated and cut  Following this, the anterior leaf of the broad ligament was then taken down on the left side, dissecting down towards the peritoneal reflection at the base of the bladder and adjacent to the cervix  The same process was then repeated on the right side such that both sides met and the anterior leaflet had been appropriately skeletonized  Once uterine arteries were ligated,  A circumferential incision was made at the cervical vaginal reflection using cautery  The blue bulb  surrounding the cervix was visualized during the cautery  Once the incision was completed, the cervix, uterus, tubes  were removed thru the vagina  The suture was introduced thru the vagina and grasped with a laparoscopic grasper  The vaginal vault was then sutured laparoscopically in a running fasion securing first the anterior edge of the cuff followed by the posterior edge with v-lock suture  Good hemostasis was obtained  Using the laparoscopic irrigation device, the abdomen was carefully irrigated and inspected to ensure complete hemostasis  Surgi snow was placed over cuff for hemostasis  Once the entire abdomen was inspected , the water was suctioned and the instruments carefully removed   The ports were then removed under direct visualization being sure to note hemostasis of the port sites on removal  The incisions were then closed with monocryl sutures  Adequate urine was made, methelyne blue was injected and adequate green/blue urine noted    The patient tolerated the procedure well  The patient was awakened from anesthesia and taken to the recovery room in stable condition  All sponges, instruments, and sharps were counted and correct         Patient Disposition:  PACU       SIGNATURE: Nicholas Calles DO  DATE: August 3, 2022  TIME: 11:47 AM

## 2022-08-03 NOTE — INTERVAL H&P NOTE
H&P reviewed  After examining the patient I find no changes in the patients condition since the H&P had been written      Vitals:    08/03/22 0627   BP: 138/66   Pulse: 58   Resp: 18   Temp: 97 9 °F (36 6 °C)   SpO2: 99%

## 2022-08-17 ENCOUNTER — APPOINTMENT (OUTPATIENT)
Dept: LAB | Facility: HOSPITAL | Age: 47
End: 2022-08-17
Attending: OBSTETRICS & GYNECOLOGY
Payer: COMMERCIAL

## 2022-08-17 DIAGNOSIS — D49.59: ICD-10-CM

## 2022-08-17 LAB
ALBUMIN SERPL BCP-MCNC: 3.7 G/DL (ref 3.5–5)
ALP SERPL-CCNC: 68 U/L (ref 46–116)
ALT SERPL W P-5'-P-CCNC: 37 U/L (ref 12–78)
ANION GAP SERPL CALCULATED.3IONS-SCNC: 13 MMOL/L (ref 4–13)
AST SERPL W P-5'-P-CCNC: 14 U/L (ref 5–45)
BILIRUB SERPL-MCNC: 0.25 MG/DL (ref 0.2–1)
BUN SERPL-MCNC: 18 MG/DL (ref 5–25)
CALCIUM SERPL-MCNC: 8.9 MG/DL (ref 8.3–10.1)
CHLORIDE SERPL-SCNC: 104 MMOL/L (ref 96–108)
CO2 SERPL-SCNC: 26 MMOL/L (ref 21–32)
CREAT SERPL-MCNC: 0.89 MG/DL (ref 0.6–1.3)
GFR SERPL CREATININE-BSD FRML MDRD: 77 ML/MIN/1.73SQ M
GLUCOSE SERPL-MCNC: 153 MG/DL (ref 65–140)
POTASSIUM SERPL-SCNC: 2.8 MMOL/L (ref 3.5–5.3)
PROT SERPL-MCNC: 7.4 G/DL (ref 6.4–8.4)
SODIUM SERPL-SCNC: 143 MMOL/L (ref 135–147)

## 2022-08-17 PROCEDURE — 80053 COMPREHEN METABOLIC PANEL: CPT

## 2022-08-17 PROCEDURE — 86304 IMMUNOASSAY TUMOR CA 125: CPT

## 2022-08-17 PROCEDURE — 36415 COLL VENOUS BLD VENIPUNCTURE: CPT

## 2022-08-18 LAB — CANCER AG125 SERPL-ACNC: 8.2 U/ML (ref 0–30)

## 2022-08-24 ENCOUNTER — HOSPITAL ENCOUNTER (OUTPATIENT)
Dept: CT IMAGING | Facility: HOSPITAL | Age: 47
Discharge: HOME | End: 2022-08-24
Payer: COMMERCIAL

## 2022-08-24 DIAGNOSIS — D49.59: ICD-10-CM

## 2022-08-24 PROCEDURE — G1004 CDSM NDSC: HCPCS

## 2022-08-24 PROCEDURE — 71260 CT THORAX DX C+: CPT

## 2022-08-24 PROCEDURE — 74177 CT ABD & PELVIS W/CONTRAST: CPT

## 2022-08-24 RX ADMIN — IOHEXOL 75 ML: 350 INJECTION, SOLUTION INTRAVENOUS at 07:54

## 2022-11-01 ENCOUNTER — CONSULT (OUTPATIENT)
Dept: GYNECOLOGIC ONCOLOGY | Facility: CLINIC | Age: 47
End: 2022-11-01

## 2022-11-01 ENCOUNTER — TELEPHONE (OUTPATIENT)
Dept: GENETICS | Facility: CLINIC | Age: 47
End: 2022-11-01

## 2022-11-01 VITALS
OXYGEN SATURATION: 98 % | TEMPERATURE: 98.2 F | BODY MASS INDEX: 28.89 KG/M2 | SYSTOLIC BLOOD PRESSURE: 160 MMHG | WEIGHT: 157 LBS | HEART RATE: 80 BPM | HEIGHT: 62 IN | DIASTOLIC BLOOD PRESSURE: 88 MMHG

## 2022-11-01 DIAGNOSIS — D39.10 SEROUS SURFACE PAPILLARY TUMOR WITH BORDERLINE MALIGNANT FEATURES: Primary | ICD-10-CM

## 2022-11-01 DIAGNOSIS — E27.8 ADRENAL MASS (HCC): ICD-10-CM

## 2022-11-01 DIAGNOSIS — Z80.9 FAMILY HISTORY OF CANCER: ICD-10-CM

## 2022-11-01 PROBLEM — Z98.890 PONV (POSTOPERATIVE NAUSEA AND VOMITING): Status: RESOLVED | Noted: 2021-11-05 | Resolved: 2022-11-01

## 2022-11-01 PROBLEM — N93.9 ABNORMAL UTERINE AND VAGINAL BLEEDING, UNSPECIFIED: Status: RESOLVED | Noted: 2021-11-05 | Resolved: 2022-11-01

## 2022-11-01 PROBLEM — R11.2 PONV (POSTOPERATIVE NAUSEA AND VOMITING): Status: RESOLVED | Noted: 2021-11-05 | Resolved: 2022-11-01

## 2022-11-01 RX ORDER — HEPARIN SODIUM 5000 [USP'U]/ML
5000 INJECTION, SOLUTION INTRAVENOUS; SUBCUTANEOUS
Status: CANCELLED | OUTPATIENT
Start: 2022-11-11 | End: 2022-11-12

## 2022-11-01 RX ORDER — ACETAMINOPHEN 325 MG/1
975 TABLET ORAL ONCE
Status: CANCELLED | OUTPATIENT
Start: 2022-11-10 | End: 2022-11-01

## 2022-11-01 RX ORDER — LOSARTAN POTASSIUM 100 MG/1
TABLET ORAL
COMMUNITY
Start: 2022-08-09

## 2022-11-01 RX ORDER — DEXAMETHASONE 1 MG
TABLET ORAL
Qty: 1 TABLET | Refills: 0 | Status: SHIPPED | OUTPATIENT
Start: 2022-11-01 | End: 2022-11-08

## 2022-11-01 RX ORDER — CEFAZOLIN SODIUM 1 G/50ML
1000 SOLUTION INTRAVENOUS ONCE
Status: CANCELLED | OUTPATIENT
Start: 2022-11-10 | End: 2022-11-01

## 2022-11-01 RX ORDER — CELECOXIB 100 MG/1
200 CAPSULE ORAL ONCE
Status: CANCELLED | OUTPATIENT
Start: 2022-11-10 | End: 2022-11-01

## 2022-11-01 RX ORDER — POTASSIUM CHLORIDE 20 MEQ/1
20 TABLET, EXTENDED RELEASE ORAL DAILY
COMMUNITY
Start: 2022-09-27

## 2022-11-01 RX ORDER — OXYCODONE HYDROCHLORIDE AND ACETAMINOPHEN 5; 325 MG/1; MG/1
TABLET ORAL
COMMUNITY
Start: 2022-08-03 | End: 2022-11-10

## 2022-11-01 NOTE — PATIENT INSTRUCTIONS
You must complete blood work and urine tests as indicated  You must take dexamethasone 1 mg at midnight the night before blood work to be obtained at 7-9 a m  for cortisol levels  Surgical instructions as per operative packet

## 2022-11-01 NOTE — PROGRESS NOTES
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     This was identified on histologic evaluation of fallopian tubes from hysterectomy/bilateral salpingectomy specimen  Surgery was performed for abnormal uterine bleeding  Reportedly ovaries were normal   However, given findings of borderline serous features and typical ovarian origin of this neoplasm I have discussed with patient options of observation versus intervention  I have recommended to proceed with robotic bilateral oophorectomy, staging and all other indicated procedures  She agrees and wants to proceed  She has good exercise tolerance and no additional cardiovascular workup is indicated  Preoperative testing will be prescribed as per procedure pass  Will hold losartan 48 hours prior to surgery to prevent acute kidney injury  She will be encouraged to take her Topamax to prevent decreasing seizure threshold and also evidently to prevent migraines  Other preop orders as per [facility] and [facility]  I discussed with patient indication, risks, benefits and alternatives of surgical exploration  We discussed possibility of bleeding requiring blood transfusion, life-threatening infections requiring additional procedures, injuries to surrounding organs such as bladder, ureters, gastrointestinal tract and or neurovascular structures  Discussed potential risk of lymphedema if lymphadenectomy is deemed necessary  Additionally, we discussed general risks associated to stress of surgery such as venous thromboembolism, acute myocardial events and stroke  All questions answered to patient's satisfaction  Discussed potential conversion to laparotomy  She agrees and wants to proceed  Informed consent form signed              Relevant Orders    Case request operating room: ROBOTIC BILATERAL OOPHORECTOMY, STAGING AND ALL OTHER INDICATED PROCEDURES (Completed) CBC and differential    Type and screen    Comprehensive metabolic panel       Other    Adrenal mass (HCC)     Incidentally identified on CT scan to stage borderline tumor  Will obtain dedicated adrenal protocol CT  Of interest, patient has chronic diarrhea, hypertension requiring 2 medications as well as acne and stigmata of hyperandrogenism  Given potential implications of functional adrenal nodule and benefit of joint surgical intervention if/as indicated, I will order 24 hour urine connection for metanephrines, catecholamines and 5HIAA  Will also check T and DHEA-S  Will do 1 mg dexamethasone cortisol suppression test          Relevant Medications    dexamethasone (DECADRON) 1 mg tablet    Other Relevant Orders    CT abdomen pelvis w contrast    5 HIAA, urine, quantitative, 24 hour    Catecholamines, Fractionated, Urinary Fr    DHEA-sulfate    Testosterone, free, total    Metanephrine, Fractionated Plasma Free    Aldosterone/Renin Ratio    Cortisol Level, AM Specimen    Family history of cancer     Concerning family history  Patient will be referred to genetic counselors  Relevant Orders    Ambulatory Referral to Oncology Genetics              CHIEF COMPLAINT:   Here for consultation evaluation management for newly diagnosed serous borderline tumor    Patient ID: Powell Councilman is a 52 y o  female  HPI  49-year-old  with chronic diarrhea, pre diabetes, hypertension, hyperlipidemia and now status post robotic hysterectomy bilateral salpingectomy on August 3, 2022 for abnormal uterine bleeding  Final specimen demonstrated incidental surface papillary serous borderline tumor on fallopian tube specimens  Per operative report there was no evidence of ovarian masses  I discussed case with her primary gynecologist Dr Simin Braswell and recommended baseline CT scan of the chest abdomen and pelvis and referral to our service    On  CT scan was performed and demonstrated no evidence of residual or metastatic disease  An incidental 2 3 cm left adrenal nodule was identified  Patient was referred for evaluation, consultation and management  Last mammogram May 2022, reportedly negative for malignancy  Reports colonoscopy within the last 2 years  Of note, patient reports family history of 1st cousin with early-onset advanced metastatic gynecologic malignancy which she describes as “ovarian cancer"  Also aunt with breast cancer  She has not undergone genetic evaluation  Review of Systems  As per HPI  Twelve point review of systems otherwise unremarkable  Current Outpatient Medications   Medication Sig Dispense Refill   • amLODIPine (NORVASC) 5 mg tablet Take 5 mg by mouth daily     • atenolol (TENORMIN) 25 mg tablet Take 25 mg by mouth 2 (two) times a day     • Calcium-Magnesium-Vitamin D (CALCIUM 1200+D3 PO) Take 1 tablet by mouth daily     • dexamethasone (DECADRON) 1 mg tablet Take 1 tablet at midnight the night before obtaining blood work for cortisol levels  You must be at the laboratory between 7 and 9:00 a m  [facility]  1 tablet 0   • fenofibrate (TRICOR) 145 mg tablet Take 145 mg by mouth daily     • fluticasone (FLONASE) 50 mcg/act nasal spray 1 spray into each nostril daily     • hydrochlorothiazide (HYDRODIURIL) 25 mg tablet Take 25 mg by mouth daily     • losartan (COZAAR) 100 MG tablet      • Multiple Vitamins-Minerals (MULTIVITAMIN WOMEN PO) Take 1 tablet by mouth daily     • Naproxen Sodium 220 MG CAPS Take 1 capsule by mouth as needed     • potassium chloride (K-DUR,KLOR-CON) 20 mEq tablet Take 20 mEq by mouth daily     • SUMAtriptan (IMITREX) 5 MG/ACT nasal spray 1 spray into each nostril every 2 (two) hours as needed for migraine     • topiramate (TOPAMAX) 50 MG tablet Take 50 mg by mouth every 12 (twelve) hours     • valACYclovir (VALTREX) 1,000 mg tablet Take 1,000 mg by mouth as needed     • oxyCODONE-acetaminophen (PERCOCET) 5-325 mg per tablet TAKE 1 TABLET BY MOUTH EVERY 6 HOURS AS NEEDED FOR SEVERE PAIN (Patient not taking: Reported on 2022)       No current facility-administered medications for this visit  Allergies   Allergen Reactions   • Pneumococcal Vaccines Anaphylaxis     Pt states she hallucinated  • Latex Hives       Past Medical History:   Diagnosis Date   • Abnormal uterine bleeding    • Cervical polyp    • Chronic diarrhea    • Hyperlipidemia    • Hypertension    • Migraine     Pt was started on Effexor for prevention  Pt states it has helped   • PONV (postoperative nausea and vomiting)     Pt reports having times one in    • Post-operative nausea and vomiting     vomited x3 after procedure 8/3/22   • Prediabetes    • Wears contact lenses        Past Surgical History:   Procedure Laterality Date   •  SECTION     • COLONOSCOPY     • FRACTURE SURGERY Right     Pt had a finger articular surgery for repair middle finger  • HYSTERECTOMY     • HYSTERECTOMY  2022   • HYSTEROSCOPY N/A 2021    Procedure: HYSTEROSCOPY, D&C, POLYPECTOMY;  Surgeon: Brent Jovel DO;  Location:  MAIN OR;  Service: Gynecology   • MD LAPAROSCOPY W TOT HYSTERECTUTERUS <=250 GRAM  W TUBE/OVARY N/A 2022    Procedure: DIAGNOSTIC LAPAROSCOPY, ROBOTIC LAPAROSCOPIC HYSTERECTOMY, BILATERAL SALPINGECTOMY;  Surgeon: Brent Jovel DO;  Location:  MAIN OR;  Service: Gynecology   • SHOULDER ARTHROSCOPY Right        OB History    No obstetric history on file  Family History   Problem Relation Age of Onset   • Heart disease Mother    • COPD Mother    • Stroke Father        The following portions of the patient's history were reviewed and updated as appropriate: allergies, current medications, past family history, past medical history, past social history, past surgical history and problem list       Objective:    Blood pressure 160/88, pulse 80, temperature 98 2 °F (36 8 °C), height 5' 2" (1 575 m), weight 71 2 kg (157 lb), SpO2 98 %    Body mass index is 28 72 kg/m²  Physical Exam  Vitals reviewed  Exam conducted with a chaperone present  Constitutional:       General: She is not in acute distress  Appearance: She is obese  She is not ill-appearing or toxic-appearing  Eyes:      General: No scleral icterus  Right eye: No discharge  Left eye: No discharge  Conjunctiva/sclera: Conjunctivae normal    Cardiovascular:      Rate and Rhythm: Normal rate and regular rhythm  Heart sounds: Normal heart sounds  No murmur heard  Pulmonary:      Effort: Pulmonary effort is normal  No respiratory distress  Breath sounds: Normal breath sounds  No wheezing  Abdominal:      General: There is no distension  Palpations: Abdomen is soft  There is no mass  Tenderness: There is no abdominal tenderness  There is no guarding  Hernia: No hernia is present  Genitourinary:     Comments: Normal external female genitalia  Normal Bartholin's and [place]'s glands  Normal urethral meatus and no evidence of urethral discharge or masses  Bladder without fullness mass or tenderness  Vagina without lesion or discharge  No significant pelvic organ prolapse noted  Cervix and uterus are surgically absent  Bimanual exam demonstrates no evidence of pelvic masses  Anus without fissure of lesion  Musculoskeletal:      Right lower leg: No edema  Left lower leg: No edema           Lab Results   Component Value Date     8 2 08/17/2022     No results found for: WBC, HGB, HCT, MCV, PLT  Lab Results   Component Value Date    K 2 8 (L) 08/17/2022     08/17/2022    CO2 26 08/17/2022    BUN 18 08/17/2022    CREATININE 0 89 08/17/2022    CALCIUM 8 9 08/17/2022    AST 14 08/17/2022    ALT 37 08/17/2022    ALKPHOS 68 08/17/2022    EGFR 77 08/17/2022 8/24/2022 Study Result    Narrative & Impression   CT CHEST, ABDOMEN AND PELVIS WITH IV CONTRAST     INDICATION:   D49 59: Neoplasm of unspecified behavior of other genitourinary organ      COMPARISON:  None      TECHNIQUE: CT examination of the chest, abdomen and pelvis was performed  Axial, sagittal, and coronal 2D reformatted images were created from the source data and submitted for interpretation      Radiation dose length product (DLP) for this visit:  789 mGy-cm   This examination, like all CT scans performed in the [facility], was performed utilizing techniques to minimize radiation dose exposure, including the use of iterative   reconstruction and automated exposure control      IV Contrast:  75 mL of iohexol (OMNIPAQUE)  Enteric Contrast: Enteric contrast was administered      FINDINGS:     CHEST     LUNGS:  There is a 5 mm right lower lobe groundglass nodule (series 3 image 69), which appears linear on coronal images (series 601 image 88)  There is no tracheal or endobronchial lesion      PLEURA:  Unremarkable      HEART/GREAT VESSELS: Heart is unremarkable for patient's age  No thoracic aortic aneurysm      MEDIASTINUM AND KARLENE:  Unremarkable      CHEST WALL AND LOWER NECK:  Unremarkable      ABDOMEN     LIVER/BILIARY TREE:  Liver is diffusely decreased in density consistent with fatty change  No CT evidence of suspicious hepatic mass  Normal hepatic contours  No biliary dilatation      GALLBLADDER:  No calcified gallstones  No pericholecystic inflammatory change      SPLEEN:  Unremarkable      PANCREAS:  Unremarkable      ADRENAL GLANDS: There is a 2 3 cm left adrenal nodule  In this patient with history of cancer, no prior imaging to document stability of this isolated adrenal nodule, characterization with adrenal mass protocol CT is recommended now  Adrenal recommendation based on institutional consensus and Journal of [employer] of [employer]iology 2017;14:1575-8596     KIDNEYS/URETERS:  Unremarkable   No hydronephrosis      STOMACH AND BOWEL:  Unremarkable      APPENDIX:  No findings to suggest appendicitis      ABDOMINOPELVIC CAVITY:  No ascites  No pneumoperitoneum  No lymphadenopathy      VESSELS:  Unremarkable for patient's age      PELVIS     REPRODUCTIVE ORGANS:  Surgical changes of prior hysterectomy      URINARY BLADDER:  Unremarkable      ABDOMINAL WALL/INGUINAL REGIONS:  Unremarkable      OSSEOUS STRUCTURES:  No acute fracture or destructive osseous lesion      IMPRESSION:     1   Left adrenal nodule measuring 2 3 cm  In this patient with history of cancer, no prior imaging to document stability of this isolated adrenal nodule, characterization with adrenal mass protocol CT is recommended now  Adrenal recommendation based on institutional consensus and Journal of [employer] 2017;14:2637-2544     2  Right lower lobe ground glass nodule measuring 5 mm, which appears linear on coronal images and may represent a tiny focus of atelectasis  Attention on follow-up imaging is recommended      3    No additional evidence of potential metastatic disease in the chest, abdomen or pelvis      The study was marked in EPIC for significant notification      Workstation performed: [id number] [facility] [place] MD, [address] [place] [address]  11/1/2022  2:15 PM

## 2022-11-01 NOTE — H&P (VIEW-ONLY)
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     This was identified on histologic evaluation of fallopian tubes from hysterectomy/bilateral salpingectomy specimen  Surgery was performed for abnormal uterine bleeding  Reportedly ovaries were normal   However, given findings of borderline serous features and typical ovarian origin of this neoplasm I have discussed with patient options of observation versus intervention  I have recommended to proceed with robotic bilateral oophorectomy, staging and all other indicated procedures  She agrees and wants to proceed  She has good exercise tolerance and no additional cardiovascular workup is indicated  Preoperative testing will be prescribed as per procedure pass  Will hold losartan 48 hours prior to surgery to prevent acute kidney injury  She will be encouraged to take her Topamax to prevent decreasing seizure threshold and also evidently to prevent migraines  Other preop orders as per [facility]  I discussed with patient indication, risks, benefits and alternatives of surgical exploration  We discussed possibility of bleeding requiring blood transfusion, life-threatening infections requiring additional procedures, injuries to surrounding organs such as bladder, ureters, gastrointestinal tract and or neurovascular structures  Discussed potential risk of lymphedema if lymphadenectomy is deemed necessary  Additionally, we discussed general risks associated to stress of surgery such as venous thromboembolism, acute myocardial events and stroke  All questions answered to patient's satisfaction  Discussed potential conversion to laparotomy  She agrees and wants to proceed  Informed consent form signed              Relevant Orders    Case request operating room: ROBOTIC BILATERAL OOPHORECTOMY, STAGING AND ALL OTHER INDICATED PROCEDURES (Completed) CBC and differential    Type and screen    Comprehensive metabolic panel       Other    Adrenal mass (HCC)     Incidentally identified on CT scan to stage borderline tumor  Will obtain dedicated adrenal protocol CT  Of interest, patient has chronic diarrhea, hypertension requiring 2 medications as well as acne and stigmata of hyperandrogenism  Given potential implications of functional adrenal nodule and benefit of joint surgical intervention if/as indicated, I will order 24 hour urine connection for metanephrines, catecholamines and 5HIAA  Will also check T and DHEA-S  Will do 1 mg dexamethasone cortisol suppression test          Relevant Medications    dexamethasone (DECADRON) 1 mg tablet    Other Relevant Orders    CT abdomen pelvis w contrast    5 HIAA, urine, quantitative, 24 hour    Catecholamines, Fractionated, Urinary Fr    DHEA-sulfate    Testosterone, free, total    Metanephrine, Fractionated Plasma Free    Aldosterone/Renin Ratio    Cortisol Level, AM Specimen    Family history of cancer     Concerning family history  Patient will be referred to genetic counselors  Relevant Orders    Ambulatory Referral to Oncology Genetics              CHIEF COMPLAINT:   Here for consultation evaluation management for newly diagnosed serous borderline tumor    Patient ID: Asha Dowling is a 52 y o  female  HPI  59-year-old  with chronic diarrhea, pre diabetes, hypertension, hyperlipidemia and now status post robotic hysterectomy bilateral salpingectomy on August 3, 2022 for abnormal uterine bleeding  Final specimen demonstrated incidental surface papillary serous borderline tumor on fallopian tube specimens  Per operative report there was no evidence of ovarian masses  I discussed case with her primary gynecologist Dr Valeri Freeman and recommended baseline CT scan of the chest abdomen and pelvis and referral to our service    On  CT scan was performed and demonstrated no evidence of residual or metastatic disease  An incidental 2 3 cm left adrenal nodule was identified  Patient was referred for evaluation, consultation and management  Last mammogram May 2022, reportedly negative for malignancy  Reports colonoscopy within the last 2 years  Of note, patient reports family history of 1st cousin with early-onset advanced metastatic gynecologic malignancy which she describes as “ovarian cancer"  Also aunt with breast cancer  She has not undergone genetic evaluation  Review of Systems  As per HPI  Twelve point review of systems otherwise unremarkable  Current Outpatient Medications   Medication Sig Dispense Refill   • amLODIPine (NORVASC) 5 mg tablet Take 5 mg by mouth daily     • atenolol (TENORMIN) 25 mg tablet Take 25 mg by mouth 2 (two) times a day     • Calcium-Magnesium-Vitamin D (CALCIUM 1200+D3 PO) Take 1 tablet by mouth daily     • dexamethasone (DECADRON) 1 mg tablet Take 1 tablet at midnight the night before obtaining blood work for cortisol levels  You must be at the laboratory between 7 and 9:00 a m  Marlodeana Martyedgardoquet  1 tablet 0   • fenofibrate (TRICOR) 145 mg tablet Take 145 mg by mouth daily     • fluticasone (FLONASE) 50 mcg/act nasal spray 1 spray into each nostril daily     • hydrochlorothiazide (HYDRODIURIL) 25 mg tablet Take 25 mg by mouth daily     • losartan (COZAAR) 100 MG tablet      • Multiple Vitamins-Minerals (MULTIVITAMIN WOMEN PO) Take 1 tablet by mouth daily     • Naproxen Sodium 220 MG CAPS Take 1 capsule by mouth as needed     • potassium chloride (K-DUR,KLOR-CON) 20 mEq tablet Take 20 mEq by mouth daily     • SUMAtriptan (IMITREX) 5 MG/ACT nasal spray 1 spray into each nostril every 2 (two) hours as needed for migraine     • topiramate (TOPAMAX) 50 MG tablet Take 50 mg by mouth every 12 (twelve) hours     • valACYclovir (VALTREX) 1,000 mg tablet Take 1,000 mg by mouth as needed     • oxyCODONE-acetaminophen (PERCOCET) 5-325 mg per tablet TAKE 1 TABLET BY MOUTH EVERY 6 HOURS AS NEEDED FOR SEVERE PAIN (Patient not taking: Reported on 2022)       No current facility-administered medications for this visit  Allergies   Allergen Reactions   • Pneumococcal Vaccines Anaphylaxis     Pt states she hallucinated  • Latex Hives       Past Medical History:   Diagnosis Date   • Abnormal uterine bleeding    • Cervical polyp    • Chronic diarrhea    • Hyperlipidemia    • Hypertension    • Migraine     Pt was started on Effexor for prevention  Pt states it has helped   • PONV (postoperative nausea and vomiting)     Pt reports having times one in    • Post-operative nausea and vomiting     vomited x3 after procedure 8/3/22   • Prediabetes    • Wears contact lenses        Past Surgical History:   Procedure Laterality Date   •  SECTION     • COLONOSCOPY     • FRACTURE SURGERY Right     Pt had a finger articular surgery for repair middle finger  • HYSTERECTOMY     • HYSTERECTOMY  2022   • HYSTEROSCOPY N/A 2021    Procedure: HYSTEROSCOPY, D&C, POLYPECTOMY;  Surgeon: Marcus Bo DO;  Location:  MAIN OR;  Service: Gynecology   • WV LAPAROSCOPY W TOT HYSTERECTUTERUS <=250 GRAM  W TUBE/OVARY N/A 2022    Procedure: DIAGNOSTIC LAPAROSCOPY, ROBOTIC LAPAROSCOPIC HYSTERECTOMY, BILATERAL SALPINGECTOMY;  Surgeon: Marcus Bo DO;  Location:  MAIN OR;  Service: Gynecology   • SHOULDER ARTHROSCOPY Right        OB History    No obstetric history on file  Family History   Problem Relation Age of Onset   • Heart disease Mother    • COPD Mother    • Stroke Father        The following portions of the patient's history were reviewed and updated as appropriate: allergies, current medications, past family history, past medical history, past social history, past surgical history and problem list       Objective:    Blood pressure 160/88, pulse 80, temperature 98 2 °F (36 8 °C), height 5' 2" (1 575 m), weight 71 2 kg (157 lb), SpO2 98 %    Body mass index is 28 72 kg/m²  Physical Exam  Vitals reviewed  Exam conducted with a chaperone present  Constitutional:       General: She is not in acute distress  Appearance: She is obese  She is not ill-appearing or toxic-appearing  Eyes:      General: No scleral icterus  Right eye: No discharge  Left eye: No discharge  Conjunctiva/sclera: Conjunctivae normal    Cardiovascular:      Rate and Rhythm: Normal rate and regular rhythm  Heart sounds: Normal heart sounds  No murmur heard  Pulmonary:      Effort: Pulmonary effort is normal  No respiratory distress  Breath sounds: Normal breath sounds  No wheezing  Abdominal:      General: There is no distension  Palpations: Abdomen is soft  There is no mass  Tenderness: There is no abdominal tenderness  There is no guarding  Hernia: No hernia is present  Genitourinary:     Comments: Normal external female genitalia  Normal Bartholin's and [place]'s glands  Normal urethral meatus and no evidence of urethral discharge or masses  Bladder without fullness mass or tenderness  Vagina without lesion or discharge  No significant pelvic organ prolapse noted  Cervix and uterus are surgically absent  Bimanual exam demonstrates no evidence of pelvic masses  Anus without fissure of lesion  Musculoskeletal:      Right lower leg: No edema  Left lower leg: No edema           Lab Results   Component Value Date     8 2 08/17/2022     No results found for: WBC, HGB, HCT, MCV, PLT  Lab Results   Component Value Date    K 2 8 (L) 08/17/2022     08/17/2022    CO2 26 08/17/2022    BUN 18 08/17/2022    CREATININE 0 89 08/17/2022    CALCIUM 8 9 08/17/2022    AST 14 08/17/2022    ALT 37 08/17/2022    ALKPHOS 68 08/17/2022    EGFR 77 08/17/2022 8/24/2022 Study Result    Narrative & Impression   CT CHEST, ABDOMEN AND PELVIS WITH IV CONTRAST     INDICATION:   D49 59: Neoplasm of unspecified behavior of other genitourinary organ      COMPARISON:  None      TECHNIQUE: CT examination of the chest, abdomen and pelvis was performed  Axial, sagittal, and coronal 2D reformatted images were created from the source data and submitted for interpretation      Radiation dose length product (DLP) for this visit:  789 mGy-cm   This examination, like all CT scans performed in the [facility], was performed utilizing techniques to minimize radiation dose exposure, including the use of iterative   reconstruction and automated exposure control      IV Contrast:  75 mL of iohexol (OMNIPAQUE)  Enteric Contrast: Enteric contrast was administered      FINDINGS:     CHEST     LUNGS:  There is a 5 mm right lower lobe groundglass nodule (series 3 image 69), which appears linear on coronal images (series 601 image 88)  There is no tracheal or endobronchial lesion      PLEURA:  Unremarkable      HEART/GREAT VESSELS: Heart is unremarkable for patient's age  No thoracic aortic aneurysm      MEDIASTINUM AND KARLENE:  Unremarkable      CHEST WALL AND LOWER NECK:  Unremarkable      ABDOMEN     LIVER/BILIARY TREE:  Liver is diffusely decreased in density consistent with fatty change  No CT evidence of suspicious hepatic mass  Normal hepatic contours  No biliary dilatation      GALLBLADDER:  No calcified gallstones  No pericholecystic inflammatory change      SPLEEN:  Unremarkable      PANCREAS:  Unremarkable      ADRENAL GLANDS: There is a 2 3 cm left adrenal nodule  In this patient with history of cancer, no prior imaging to document stability of this isolated adrenal nodule, characterization with adrenal mass protocol CT is recommended now  Adrenal recommendation based on institutional consensus and Journal of [employer] of Radiology 2017;14:1731-2993     KIDNEYS/URETERS:  Unremarkable   No hydronephrosis      STOMACH AND BOWEL:  Unremarkable      APPENDIX:  No findings to suggest appendicitis      ABDOMINOPELVIC CAVITY:  No ascites  No pneumoperitoneum  No lymphadenopathy      VESSELS:  Unremarkable for patient's age      PELVIS     REPRODUCTIVE ORGANS:  Surgical changes of prior hysterectomy      URINARY BLADDER:  Unremarkable      ABDOMINAL WALL/INGUINAL REGIONS:  Unremarkable      OSSEOUS STRUCTURES:  No acute fracture or destructive osseous lesion      IMPRESSION:     1   Left adrenal nodule measuring 2 3 cm  In this patient with history of cancer, no prior imaging to document stability of this isolated adrenal nodule, characterization with adrenal mass protocol CT is recommended now  Adrenal recommendation based on institutional consensus and Journal of [employer] 2017;14:6529-7471     2  Right lower lobe ground glass nodule measuring 5 mm, which appears linear on coronal images and may represent a tiny focus of atelectasis  Attention on follow-up imaging is recommended      3    No additional evidence of potential metastatic disease in the chest, abdomen or pelvis      The study was marked in EPIC for significant notification      Workstation performed: [id number] [facility] Orlando MD, [address] [place] [address]  11/1/2022  2:15 PM

## 2022-11-01 NOTE — ASSESSMENT & PLAN NOTE
Incidentally identified on CT scan to stage borderline tumor  Will obtain dedicated adrenal protocol CT  Of interest, patient has chronic diarrhea, hypertension requiring 2 medications as well as acne and stigmata of hyperandrogenism  Given potential implications of functional adrenal nodule and benefit of joint surgical intervention if/as indicated, I will order 24 hour urine connection for metanephrines, catecholamines and 5HIAA  Will also check T and DHEA-S    Will do 1 mg dexamethasone cortisol suppression test

## 2022-11-01 NOTE — TELEPHONE ENCOUNTER
I called Rosalva Chatman to schedule a new patient appointment with the Cancer Risk and Genetics Program       Outcome:  Genetics appointment scheduled for 2/28/22 with Maureen Castillo    Personal/Family History Related to Appointment:  Maternal grandmother with breast cancer; mother with abnormal breast findings;   No known information about paternal side    History of Genetic Testing:  did not assess    Genetics Family History Questionnaire:  I confirmed the patient's e-mail on file as the best e-mail to send an invite link for our genetics family history intake

## 2022-11-01 NOTE — ASSESSMENT & PLAN NOTE
This was identified on histologic evaluation of fallopian tubes from hysterectomy/bilateral salpingectomy specimen  Surgery was performed for abnormal uterine bleeding  Reportedly ovaries were normal   However, given findings of borderline serous features and typical ovarian origin of this neoplasm I have discussed with patient options of observation versus intervention  I have recommended to proceed with robotic bilateral oophorectomy, staging and all other indicated procedures  She agrees and wants to proceed  She has good exercise tolerance and no additional cardiovascular workup is indicated  Preoperative testing will be prescribed as per procedure pass  Will hold losartan 48 hours prior to surgery to prevent acute kidney injury  She will be encouraged to take her Topamax to prevent decreasing seizure threshold and also evidently to prevent migraines  Other preop orders as per [facility]  I discussed with patient indication, risks, benefits and alternatives of surgical exploration  We discussed possibility of bleeding requiring blood transfusion, life-threatening infections requiring additional procedures, injuries to surrounding organs such as bladder, ureters, gastrointestinal tract and or neurovascular structures  Discussed potential risk of lymphedema if lymphadenectomy is deemed necessary  Additionally, we discussed general risks associated to stress of surgery such as venous thromboembolism, acute myocardial events and stroke  All questions answered to patient's satisfaction  Discussed potential conversion to laparotomy  She agrees and wants to proceed  Informed consent form signed

## 2022-11-03 ENCOUNTER — PATIENT MESSAGE (OUTPATIENT)
Dept: GYNECOLOGIC ONCOLOGY | Facility: CLINIC | Age: 47
End: 2022-11-03

## 2022-11-03 ENCOUNTER — TELEPHONE (OUTPATIENT)
Dept: GENETICS | Facility: CLINIC | Age: 47
End: 2022-11-03

## 2022-11-03 NOTE — TELEPHONE ENCOUNTER
I spoke with Lolis Montes De Oca today to confirm her Genetics appointment  Lolis Montes De Oca is schedule to meet with genetic counselor Kat Ceja, [facility] on 2/28/2023 at 1:30 pm over the phone  Sharona will reach out to Lolis Montes De Oca at phone number [phone]  Sharona confirmed Ayala's email and a progeny invitation was sent

## 2022-11-04 ENCOUNTER — PATIENT MESSAGE (OUTPATIENT)
Dept: GYNECOLOGIC ONCOLOGY | Facility: CLINIC | Age: 47
End: 2022-11-04

## 2022-11-04 NOTE — TELEPHONE ENCOUNTER
Unfortunately Sandoval Quintero is out today and getting caught up on all there surgeries for Tuesdays patients based on case urgency  I did call the patient and left a message to make her aware of his availability as far as OR time goes depending on where she is trying to have this done  I did apologize about the wait but Sandoval Quintreo will reach out to her next week to get her scheduled

## 2022-11-07 ENCOUNTER — HOSPITAL ENCOUNTER (OUTPATIENT)
Dept: CT IMAGING | Facility: HOSPITAL | Age: 47
Discharge: HOME | End: 2022-11-07
Attending: OBSTETRICS & GYNECOLOGY

## 2022-11-07 ENCOUNTER — TELEPHONE (OUTPATIENT)
Dept: GYNECOLOGIC ONCOLOGY | Facility: CLINIC | Age: 47
End: 2022-11-07

## 2022-11-07 DIAGNOSIS — E27.8 ADRENAL MASS (HCC): ICD-10-CM

## 2022-11-07 RX ADMIN — IOHEXOL 100 ML: 350 INJECTION, SOLUTION INTRAVENOUS at 19:27

## 2022-11-08 ENCOUNTER — ANESTHESIA EVENT (OUTPATIENT)
Dept: PERIOP | Facility: HOSPITAL | Age: 47
End: 2022-11-08

## 2022-11-08 NOTE — PRE-PROCEDURE INSTRUCTIONS
Pre-Surgery Instructions:   Medication Instructions   • amLODIPine (NORVASC) 5 mg tablet Continue to take as prescribed including DOS with a small sip of water, unless usually taken at night   • atenolol (TENORMIN) 25 mg tablet Continue to take as prescribed including DOS with a small sip of water, unless usually taken at night   • Calcium-Magnesium-Vitamin D (CALCIUM 1200+D3 PO) Avoid 1 week prior to surgery    • fenofibrate (TRICOR) 145 mg tablet Continue to take as prescribed including DOS with a small sip of water, unless usually taken at night   • fluticasone (FLONASE) 50 mcg/act nasal spray Continue as prescribed   • hydrochlorothiazide (HYDRODIURIL) 25 mg tablet continue as prescribed excluding DOS   • losartan (COZAAR) 100 MG tablet continue as prescribed excluding DOS   • Multiple Vitamins-Minerals (MULTIVITAMIN WOMEN PO) Avoid 1 week prior to surgery    • Naproxen Sodium 220 MG CAPS Hold for at least 3 days prior to DOS   • potassium chloride (K-DUR,KLOR-CON) 20 mEq tablet continue as prescribed excluding DOS   • SUMAtriptan (IMITREX) 5 MG/ACT nasal spray Continue to take as prescribed including DOS with a small sip of water, unless usually taken at night   • topiramate (TOPAMAX) 50 MG tablet Continue to take as prescribed including DOS with a small sip of water, unless usually taken at night   • valACYclovir (VALTREX) 1,000 mg tablet Continue to take as prescribed including DOS with a small sip of water, unless usually taken at night    Avoid non-prescribed ASPIRIN, OTC vitamins and NSAIDS prior to surgery  Tylenol okay PRN  Continue scheduled medications excluding DOS  Avoid smoking prior to Surgery   Avoid alcohol, illicit drugs and marijuana for 24 hours prior to DOS  NPO instructions given: no food, water or anything else by mouth after midnight prior to surgery     Has 3 ensures and instructions  Shower the night before and the morning of surgery using CHG wash or antibacterial soap if CHG is not Progress Note    Patient: Pavel Rosa Date: 10/31/2020   male, 83 year old  Admit Date: 10/30/2020   Attending: Jie Dominguez MD                  SUBJECTIVE:   Patient seen and examined by me in follow up for Acute kidney injury (CMS/HCC) and hydronephrosis. The patient is resting comfortable in bed.  He states that the he had some back pain probably due to sleeping in uncomfortable hospital bed.  Otherwise denial nausea, vomiting, shortness of breath, chest pain, fever, chills, abdominal pain.    UA negative. Bueno catheter placed, no significant retention issues.  He states that there is some leakage around the Bueno catheter.  His renal function slightly improved    MEDICATIONS: Personally reviewed today in this patient's active orders section of Epic.  • sodium chloride (PF)  2 mL Intracatheter 2 times per day   • heparin (porcine)  5,000 Units Subcutaneous 3 times per day   • Potassium Standard Replacement Protocol   Does not apply See Admin Instructions   • Magnesium Standard Replacement Protocol   Does not apply See Admin Instructions   • B complex-vitamin C-folic acid  1 tablet Oral Daily   • ascorbic acid  1,000 mg Oral Daily   • aspirin  81 mg Oral Daily   • cholecalciferol  50 mcg Oral Daily   • cyanocobalamin  500 mcg Oral Daily   • latanoprost  1 drop Left Eye Nightly   • magnesium oxide  400 mg Oral Daily   • melatonin  3 mg Oral Nightly   • mirtazapine  7.5 mg Oral Nightly   • vitamin - therapeutic multivitamins w/minerals  1 tablet Oral Daily   • pilocarpine   Left Eye BID     ALLERGIES: Personally reviewed today in Epic.  ROS: Pertinent systems negative except as above.    PHYSICAL EXAM:      Vital 24 Hour Range Most Recent Value   Temperature Temp  Min: 97.3 °F (36.3 °C)  Max: 98.3 °F (36.8 °C) 98.3 °F (36.8 °C)   Pulse Pulse  Min: 59  Max: 83 64   Respiratory Resp  Min: 16  Max: 18 18   Blood Pressure BP  Min: 110/59  Max: 142/74 120/59   Pulse Oximetry SpO2  Min: 96 %  Max: 98 % 97 %    Arterial BP No data recorded     O2 No data recorded       Vital Most Recent Value First Value   Weight 94.1 kg Weight: 91.6 kg   Height 5' 11\" (180.3 cm) Height: 5' 11\" (180.3 cm)   BMI 28.93 N/A     General: well developed, well nourished and no apparent distress  CV: regular rate and rhythm and no murmurs, rubs, or thrills  Resp: clear to auscultation bilaterally  Abd: soft, nontender, nondistended and no hepatosplenomegaly  Ext: no rashes, lesions, or ulcers noted and no induration, subcutaneous nodules, or tightening noted, no clubbing, cyanosis, or ischemia, normal gait and station and 5/5 muscle strength in upper and lower extremities bilaterally, pitting edema present bilateral lower extremities and radial pulses equal bilaterally, posterior tibial pulses equal bilaterally  and dorsalis pedis pulses equal bilaterally       LABS:  Recent Labs   Lab 10/31/20  0406 10/30/20  1639   WBC 9.8 9.9   RBC 3.47* 3.98*   HGB 10.0* 11.7*   HCT 31.8* 36.0*    282   SEG 84 88     Recent Labs   Lab 10/31/20  0406 10/30/20  1644 10/30/20  0700   SODIUM 141 140  --    POTASSIUM 4.0 4.2  --    CHLORIDE 105 101  --    CO2 29 26  --    BUN 56* 54*  --    CREATININE 2.30* 2.49* 2.77*   GLUCOSE 111* 136*  --    CALCIUM 9.4 10.1  --    ALBUMIN 3.0* 3.7  --    AST  --  23  --    GPT  --  21  --    BILIRUBIN  --  0.4  --    ALKPT  --  59  --        Radiology:  Us Lower Extremity Venous Duplex Bilat    Result Date: 10/30/2020  Bilateral LOWER EXTREMITY DUPLEX VENOUS ULTRASOUND  Gray Scale, Spectral Analysis and Color Flow/Doppler Performed HISTORY:  Pain COMPARISON:  None. TECHNIQUE AND FINDINGS:  Sonographic surveillance of the bilateral lower extremity venous system was carried out, from the level of the common femoral vein through the popliteal.  Posterior tibial vein was evaluated at the ankle and knee joint.  The saphenofemoral junction was evaluated as well in gray-scale fashion.  Gray-scale images were complemented by  indicated  Avoid shaving for 24 hours prior to DOS  Avoid using lotions, powders, oils, makeup, hair products, etc  DOS  Patient given up to date visitor guidelines  A ride home after surgery is needed- failure to have a ride can result in cancellation  Remove jewelry and not to bring valuables DOS  Dentures and contact lenses will have to be removed for surgery  Patient understands he or she will receive a call the afternoon before surgery regarding an arrival time  If you have any changes in your health before DOS please call the surgeon's office  Patient verbalized understanding of all instructions  color-flow analysis, short-axis compression views, and spectral response to calf augmentation. The examined veins are widely patent.  There is no evidence for wall thickening or intraluminal echoes.  Complete compressibility is seen in the short axis.  Color-flow analysis is unremarkable.  Spectral enhancement with calf augmentation is intact.     IMPRESSION:  No duplex Doppler sonographic evidence for deep venous thrombosis in the examined veins of the bilateral legs.       Central line:    Available Intravenous Access     PICC Line / CVC Line / PIV Line / Intraosseous Line / Line / UAC Line            Peripheral IV 10/30/20 Right Wrist 22 less than 1 day                 Blood Cultures:   No results found for this or any previous visit.       Active Hospital Problems    Diagnosis   • Acute kidney injury (CMS/HCC)   • Hydronephrosis determined by ultrasound   • Retroperitoneal fibrosis   • Bilateral lower extremity edema   • GERD without esophagitis        ASSESSMENT AND PLAN:     Acute renal injury, bilateral moderate hydronephrosis-   Presented with the creatinine 2.77, increased from 1.15 on 09/29     Possible postrenal etiology /concern of of obstruction; given swelling of scrotum and penile, and Moderate bilateral hydronephrosis on U/S  Place Jefferson catheter  Consulted Nephrology for LES  IV hydration 100 cc hour  Checked urine, ruled out infection. Also obtained urine osmolality, creatinine protein ratio, and sodium level  Daily weight  Hold home medication: Lasix, hydrochlorothiazide.  Renal dosing home med.  Avoid renal toxic agents.      Hypertension -   Hold home meds: Triamterene/hydrochlorothiazide  If systolic blood pressure greater than 140, will start amlodipine. His BP currently WNL without home med.     Bilateral hydronephrosis -   Discovered on ultrasound.     Discussed case with urology for hydronephrosis; Dr. Saleem states: given improving Cr with jefferson, hold off intervention. If renal function  does not continue to improve, may need urinary stent placement.   Either way he need follow up with urology at out patient.      Glaucoma -   Resume home regimen     History of MI  -   No chest pain, no shortness of breath, recent echo showed normal ejection fraction 70%.  Continue aspirin.     Retroperitoneal fibrosis -   U/S showed Abnormal ultrasound of extensive soft tissue thickening around the aorta, concerning for retroperitoneal fibrosis.  CT of abdomen showed there was some atherosclerotic change of aorta with Para-aortic fat stranding in 2011.  Evaluated by Dr. Borges; suggested if significant inflammation is shown, recommend IV Solu-Medrol 40 mg every 12 hours for presumed retroperitoneal fibrosis.  Obtained CRP 2.3.     Low extremities edema  - ultrasound of the lower extremity negative for DVT  - elevated legs, compression stocking.  - checked nutrition markers    Smoking status- non smoker    Nutrition status- appropriate    Body mass index is 28.93 kg/m². - appropriate    DVT Prophylaxis - Heparin 5000 units sub q tid; SCDs      Disposition: TBD The patients treatment plans were discussed with patient.    I personally spent 30 minutes today in the care and management of this patient and more than 50% of my time was spent in counseling and coordination of care.      FADI Andres  Hospitalist  10/31/2020  9:05 AM

## 2022-11-10 ENCOUNTER — ANESTHESIA (OUTPATIENT)
Dept: PERIOP | Facility: HOSPITAL | Age: 47
End: 2022-11-10

## 2022-11-10 ENCOUNTER — HOSPITAL ENCOUNTER (OUTPATIENT)
Facility: HOSPITAL | Age: 47
Setting detail: OUTPATIENT SURGERY
Discharge: HOME | End: 2022-11-10
Attending: OBSTETRICS & GYNECOLOGY | Admitting: OBSTETRICS & GYNECOLOGY

## 2022-11-10 VITALS
TEMPERATURE: 99.6 F | OXYGEN SATURATION: 95 % | HEART RATE: 90 BPM | SYSTOLIC BLOOD PRESSURE: 117 MMHG | BODY MASS INDEX: 28.06 KG/M2 | WEIGHT: 153.44 LBS | DIASTOLIC BLOOD PRESSURE: 66 MMHG | RESPIRATION RATE: 16 BRPM

## 2022-11-10 DIAGNOSIS — D39.10 SEROUS SURFACE PAPILLARY TUMOR WITH BORDERLINE MALIGNANT FEATURES: ICD-10-CM

## 2022-11-10 PROBLEM — Z90.722 S/P BSO (BILATERAL SALPINGO-OOPHORECTOMY): Status: ACTIVE | Noted: 2022-11-10

## 2022-11-10 PROBLEM — E87.6 HYPOKALEMIA: Status: ACTIVE | Noted: 2022-11-10

## 2022-11-10 LAB
ABO GROUP BLD: NORMAL
ANION GAP SERPL CALCULATED.3IONS-SCNC: 12 MMOL/L (ref 4–13)
BLD GP AB SCN SERPL QL: NEGATIVE
BUN SERPL-MCNC: 12 MG/DL (ref 5–25)
CALCIUM SERPL-MCNC: 8.6 MG/DL (ref 8.3–10.1)
CHLORIDE SERPL-SCNC: 107 MMOL/L (ref 96–108)
CO2 SERPL-SCNC: 24 MMOL/L (ref 21–32)
CREAT SERPL-MCNC: 0.65 MG/DL (ref 0.6–1.3)
GFR SERPL CREATININE-BSD FRML MDRD: 106 ML/MIN/1.73SQ M
GLUCOSE P FAST SERPL-MCNC: 78 MG/DL (ref 65–99)
GLUCOSE SERPL-MCNC: 78 MG/DL (ref 65–140)
POTASSIUM SERPL-SCNC: 3.1 MMOL/L (ref 3.5–5.3)
RH BLD: POSITIVE
SODIUM SERPL-SCNC: 143 MMOL/L (ref 135–147)
SPECIMEN EXPIRATION DATE: NORMAL

## 2022-11-10 RX ORDER — KETOROLAC TROMETHAMINE 30 MG/ML
INJECTION, SOLUTION INTRAMUSCULAR; INTRAVENOUS AS NEEDED
Status: DISCONTINUED | OUTPATIENT
Start: 2022-11-10 | End: 2022-11-10

## 2022-11-10 RX ORDER — NEOSTIGMINE METHYLSULFATE 1 MG/ML
INJECTION INTRAVENOUS AS NEEDED
Status: DISCONTINUED | OUTPATIENT
Start: 2022-11-10 | End: 2022-11-10

## 2022-11-10 RX ORDER — VECURONIUM BROMIDE 1 MG/ML
INJECTION, POWDER, LYOPHILIZED, FOR SOLUTION INTRAVENOUS AS NEEDED
Status: DISCONTINUED | OUTPATIENT
Start: 2022-11-10 | End: 2022-11-10

## 2022-11-10 RX ORDER — CEFAZOLIN SODIUM 1 G/50ML
1000 SOLUTION INTRAVENOUS ONCE
Status: COMPLETED | OUTPATIENT
Start: 2022-11-10 | End: 2022-11-10

## 2022-11-10 RX ORDER — CELECOXIB 100 MG/1
200 CAPSULE ORAL ONCE
Status: COMPLETED | OUTPATIENT
Start: 2022-11-10 | End: 2022-11-10

## 2022-11-10 RX ORDER — PROPOFOL 10 MG/ML
INJECTION, EMULSION INTRAVENOUS CONTINUOUS PRN
Status: DISCONTINUED | OUTPATIENT
Start: 2022-11-10 | End: 2022-11-10

## 2022-11-10 RX ORDER — FENTANYL CITRATE/PF 50 MCG/ML
25 SYRINGE (ML) INJECTION
Status: DISCONTINUED | OUTPATIENT
Start: 2022-11-10 | End: 2022-11-10 | Stop reason: HOSPADM

## 2022-11-10 RX ORDER — ONDANSETRON 2 MG/ML
4 INJECTION INTRAMUSCULAR; INTRAVENOUS ONCE AS NEEDED
Status: DISCONTINUED | OUTPATIENT
Start: 2022-11-10 | End: 2022-11-10 | Stop reason: HOSPADM

## 2022-11-10 RX ORDER — OXYCODONE HYDROCHLORIDE 5 MG/1
5 TABLET ORAL EVERY 6 HOURS PRN
Status: DISCONTINUED | OUTPATIENT
Start: 2022-11-10 | End: 2022-11-10 | Stop reason: HOSPADM

## 2022-11-10 RX ORDER — LABETALOL HYDROCHLORIDE 5 MG/ML
INJECTION, SOLUTION INTRAVENOUS AS NEEDED
Status: DISCONTINUED | OUTPATIENT
Start: 2022-11-10 | End: 2022-11-10

## 2022-11-10 RX ORDER — LIDOCAINE HYDROCHLORIDE 20 MG/ML
INJECTION, SOLUTION EPIDURAL; INFILTRATION; INTRACAUDAL; PERINEURAL AS NEEDED
Status: DISCONTINUED | OUTPATIENT
Start: 2022-11-10 | End: 2022-11-10

## 2022-11-10 RX ORDER — SODIUM CHLORIDE, SODIUM LACTATE, POTASSIUM CHLORIDE, CALCIUM CHLORIDE 600; 310; 30; 20 MG/100ML; MG/100ML; MG/100ML; MG/100ML
125 INJECTION, SOLUTION INTRAVENOUS CONTINUOUS
Status: DISCONTINUED | OUTPATIENT
Start: 2022-11-10 | End: 2022-11-10 | Stop reason: HOSPADM

## 2022-11-10 RX ORDER — MIDAZOLAM HYDROCHLORIDE 2 MG/2ML
INJECTION, SOLUTION INTRAMUSCULAR; INTRAVENOUS AS NEEDED
Status: DISCONTINUED | OUTPATIENT
Start: 2022-11-10 | End: 2022-11-10

## 2022-11-10 RX ORDER — SCOLOPAMINE TRANSDERMAL SYSTEM 1 MG/1
1 PATCH, EXTENDED RELEASE TRANSDERMAL ONCE AS NEEDED
Status: DISCONTINUED | OUTPATIENT
Start: 2022-11-10 | End: 2022-11-10 | Stop reason: HOSPADM

## 2022-11-10 RX ORDER — PROPOFOL 10 MG/ML
INJECTION, EMULSION INTRAVENOUS AS NEEDED
Status: DISCONTINUED | OUTPATIENT
Start: 2022-11-10 | End: 2022-11-10

## 2022-11-10 RX ORDER — ONDANSETRON 2 MG/ML
INJECTION INTRAMUSCULAR; INTRAVENOUS AS NEEDED
Status: DISCONTINUED | OUTPATIENT
Start: 2022-11-10 | End: 2022-11-10

## 2022-11-10 RX ORDER — MAGNESIUM HYDROXIDE 1200 MG/15ML
LIQUID ORAL AS NEEDED
Status: DISCONTINUED | OUTPATIENT
Start: 2022-11-10 | End: 2022-11-10 | Stop reason: HOSPADM

## 2022-11-10 RX ORDER — GLYCOPYRROLATE 0.2 MG/ML
INJECTION INTRAMUSCULAR; INTRAVENOUS AS NEEDED
Status: DISCONTINUED | OUTPATIENT
Start: 2022-11-10 | End: 2022-11-10

## 2022-11-10 RX ORDER — HYDROMORPHONE HCL/PF 1 MG/ML
0.5 SYRINGE (ML) INJECTION
Status: DISCONTINUED | OUTPATIENT
Start: 2022-11-10 | End: 2022-11-10 | Stop reason: HOSPADM

## 2022-11-10 RX ORDER — MEPERIDINE HYDROCHLORIDE 25 MG/ML
12.5 INJECTION INTRAMUSCULAR; INTRAVENOUS; SUBCUTANEOUS
Status: DISCONTINUED | OUTPATIENT
Start: 2022-11-10 | End: 2022-11-10 | Stop reason: HOSPADM

## 2022-11-10 RX ORDER — HEPARIN SODIUM 5000 [USP'U]/ML
5000 INJECTION, SOLUTION INTRAVENOUS; SUBCUTANEOUS
Status: COMPLETED | OUTPATIENT
Start: 2022-11-10 | End: 2022-11-10

## 2022-11-10 RX ORDER — ACETAMINOPHEN 325 MG/1
975 TABLET ORAL ONCE
Status: COMPLETED | OUTPATIENT
Start: 2022-11-10 | End: 2022-11-10

## 2022-11-10 RX ORDER — FENTANYL CITRATE 50 UG/ML
INJECTION, SOLUTION INTRAMUSCULAR; INTRAVENOUS AS NEEDED
Status: DISCONTINUED | OUTPATIENT
Start: 2022-11-10 | End: 2022-11-10

## 2022-11-10 RX ORDER — DEXAMETHASONE SODIUM PHOSPHATE 10 MG/ML
INJECTION, SOLUTION INTRAMUSCULAR; INTRAVENOUS AS NEEDED
Status: DISCONTINUED | OUTPATIENT
Start: 2022-11-10 | End: 2022-11-10

## 2022-11-10 RX ORDER — BUPIVACAINE HYDROCHLORIDE 2.5 MG/ML
INJECTION, SOLUTION EPIDURAL; INFILTRATION; INTRACAUDAL AS NEEDED
Status: DISCONTINUED | OUTPATIENT
Start: 2022-11-10 | End: 2022-11-10 | Stop reason: HOSPADM

## 2022-11-10 RX ADMIN — MIDAZOLAM 2 MG: 1 INJECTION INTRAMUSCULAR; INTRAVENOUS at 15:15

## 2022-11-10 RX ADMIN — SCOPALAMINE 1 PATCH: 1 PATCH, EXTENDED RELEASE TRANSDERMAL at 11:31

## 2022-11-10 RX ADMIN — CEFAZOLIN SODIUM 1000 MG: 1 SOLUTION INTRAVENOUS at 15:15

## 2022-11-10 RX ADMIN — ACETAMINOPHEN 975 MG: 325 TABLET ORAL at 11:21

## 2022-11-10 RX ADMIN — KETOROLAC TROMETHAMINE 30 MG: 30 INJECTION, SOLUTION INTRAMUSCULAR at 16:23

## 2022-11-10 RX ADMIN — CELECOXIB 200 MG: 100 CAPSULE ORAL at 11:32

## 2022-11-10 RX ADMIN — OXYCODONE 5 MG: 5 TABLET ORAL at 19:19

## 2022-11-10 RX ADMIN — FENTANYL CITRATE 25 MCG: 50 INJECTION INTRAMUSCULAR; INTRAVENOUS at 17:56

## 2022-11-10 RX ADMIN — FENTANYL CITRATE 50 MCG: 50 INJECTION INTRAMUSCULAR; INTRAVENOUS at 15:36

## 2022-11-10 RX ADMIN — FENTANYL CITRATE 100 MCG: 50 INJECTION INTRAMUSCULAR; INTRAVENOUS at 16:25

## 2022-11-10 RX ADMIN — SODIUM CHLORIDE, SODIUM LACTATE, POTASSIUM CHLORIDE, AND CALCIUM CHLORIDE: .6; .31; .03; .02 INJECTION, SOLUTION INTRAVENOUS at 15:56

## 2022-11-10 RX ADMIN — SODIUM CHLORIDE, SODIUM LACTATE, POTASSIUM CHLORIDE, AND CALCIUM CHLORIDE 125 ML/HR: .6; .31; .03; .02 INJECTION, SOLUTION INTRAVENOUS at 11:23

## 2022-11-10 RX ADMIN — VECURONIUM BROMIDE 7 MG: 1 INJECTION, POWDER, LYOPHILIZED, FOR SOLUTION INTRAVENOUS at 15:20

## 2022-11-10 RX ADMIN — LABETALOL HYDROCHLORIDE 5 MG: 5 INJECTION, SOLUTION INTRAVENOUS at 16:13

## 2022-11-10 RX ADMIN — SODIUM CHLORIDE, SODIUM LACTATE, POTASSIUM CHLORIDE, AND CALCIUM CHLORIDE: .6; .31; .03; .02 INJECTION, SOLUTION INTRAVENOUS at 15:15

## 2022-11-10 RX ADMIN — HEPARIN SODIUM 5000 UNITS: 5000 INJECTION INTRAVENOUS; SUBCUTANEOUS at 14:03

## 2022-11-10 RX ADMIN — LABETALOL HYDROCHLORIDE 2.5 MG: 5 INJECTION, SOLUTION INTRAVENOUS at 15:59

## 2022-11-10 RX ADMIN — PROPOFOL 140 MCG/KG/MIN: 10 INJECTION, EMULSION INTRAVENOUS at 15:20

## 2022-11-10 RX ADMIN — PROPOFOL 200 MG: 10 INJECTION, EMULSION INTRAVENOUS at 15:19

## 2022-11-10 RX ADMIN — NEOSTIGMINE METHYLSULFATE 3 MG: 1 INJECTION INTRAVENOUS at 16:36

## 2022-11-10 RX ADMIN — FENTANYL CITRATE 100 MCG: 50 INJECTION INTRAMUSCULAR; INTRAVENOUS at 15:19

## 2022-11-10 RX ADMIN — GLYCOPYRROLATE 0.4 MG: 0.2 INJECTION, SOLUTION INTRAMUSCULAR; INTRAVENOUS at 16:36

## 2022-11-10 RX ADMIN — LIDOCAINE HYDROCHLORIDE 100 MG: 20 INJECTION, SOLUTION EPIDURAL; INFILTRATION; INTRACAUDAL; PERINEURAL at 15:19

## 2022-11-10 RX ADMIN — SODIUM CHLORIDE, SODIUM LACTATE, POTASSIUM CHLORIDE, AND CALCIUM CHLORIDE 125 ML/HR: .6; .31; .03; .02 INJECTION, SOLUTION INTRAVENOUS at 14:30

## 2022-11-10 RX ADMIN — ONDANSETRON 4 MG: 2 INJECTION INTRAMUSCULAR; INTRAVENOUS at 16:23

## 2022-11-10 RX ADMIN — LABETALOL HYDROCHLORIDE 2.5 MG: 5 INJECTION, SOLUTION INTRAVENOUS at 15:55

## 2022-11-10 RX ADMIN — DEXAMETHASONE SODIUM PHOSPHATE 10 MG: 10 INJECTION, SOLUTION INTRAMUSCULAR; INTRAVENOUS at 15:19

## 2022-11-10 RX ADMIN — FENTANYL CITRATE 50 MCG: 50 INJECTION INTRAMUSCULAR; INTRAVENOUS at 15:50

## 2022-11-10 NOTE — ANESTHESIA PREPROCEDURE EVALUATION
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      Other   (+) Adrenal mass (HCC)   (+) Hypokalemia        Physical Exam    Airway    Mallampati score: II  TM Distance: >3 FB  Neck ROM: full     Dental   No notable dental hx     Cardiovascular  Rhythm: regular, Rate: normal, Cardiovascular exam normal    Pulmonary  Pulmonary exam normal Breath sounds clear to auscultation,     Other Findings        Anesthesia Plan  ASA Score- 2     Anesthesia Type- general with ASA Monitors  Additional Monitors:   Airway Plan: ETT  Comment: SCOP patch ordered  Repeat BMP ordered (K+ was 3 3 on preop labs  Has been on oral K+ repletion (previously 2 8)  Plan Factors-    Chart reviewed  Existing labs reviewed  Patient summary reviewed  Patient is not a current smoker  Patient instructed to abstain from smoking on day of procedure  Patient did not smoke on day of surgery  Induction- intravenous  Postoperative Plan-     Informed Consent- Anesthetic plan and risks discussed with patient and spouse George Mo

## 2022-11-10 NOTE — DISCHARGE INSTRUCTIONS
Robotic bilateral salpingo-oophorectomy, staging biopsies, omentectomy   Discharge Instructions    WHAT YOU NEED TO KNOW:   Today were removed both of your ovaries  There were some abnormal appearing areas which could represent scar from recent surgery but were completely excised and removed  We also removed the omentum (fatty apron that covers abdominal organs)  All tissues were sent to pathology  Final pathology will be issued approximately 5-7 days and will discuss such results at your next office appointment  Your procedure was uncomplicated  DISCHARGE INSTRUCTIONS:   Contact your doctor at the number above if:   You have a fever over 101o  You have nausea or are vomiting that does not improve after a light meal    Your pain is getting worse, even after you take medicine  You feel pain or burning when you urinate, or you have trouble urinating  You have pus or a foul-smelling odor coming from your vagina and/or wound  Your wound is red, swollen, or draining pus  You see new or an increased amount of bright red blood coming from your vagina or your incisions  You have questions or concerns about your condition or care  Seek care immediately:   Your arm or leg feels warm, tender, and painful  It may look swollen and red  You have increasing abdominal or pelvic pain  You have heavy vaginal bleeding that fills 1 or more sanitary pads in 1 hour  Call 911 for any of the following: You feel lightheaded, short of breath, and have chest pain  You cough up blood  Medicines: You may need any of the following:  Prescription medicine will not be given for this procedure  Resume your previous medications as directed  Extra-strength Tylenol: This medications over-the-counter  Take 2 tablets every 6 hours as needed for mild-to-moderate pain  Ibuprofen/Advil/Motrin 200 mg tablets: This medication is over-the-counter  Take 3 tablets every 8 hours as needed for moderate to severe pain  Take this medication with food  The drink plenty of fluids while using this medication to prevent kidney injury  Metamucil or MiraLax: This product is over-the-counter  Distal 1 large tbsp in a large glass of water  Use once or twice a day for 1-2 weeks prevent and or treat constipation  Take your medicines as directed  Contact your healthcare provider if you think your medicine is not helping or if you have side effects  Tell him or her if you are allergic to any medicine  Keep a list of the medicines, vitamins, and herbs you take  Include the amounts, and when and why you take them  Bring the list or the pill bottles to follow-up visits  Carry your medicine list with you in case of an emergency  Activity:   Rest as needed  Get up and move around as directed to help prevent blood clots  Start with short walks and slowly increase the distance every day  Limit the number of times you climb stairs to 2 times each day for the first week  Plan most of your daily activities on one level of your home  Do not lift objects heavier than 10 pounds until seen in the office for your follow-up appointment  Do not strain during bowel movements  High-fiber foods and extra liquids can help you prevent constipation  Examples of high-fiber foods are fruit and bran  Prune juice and water are good liquids to drink  Do not have sex, use tampons, or douche and do not do tub baths/swimming until cleared by your physician at your postoperative appointment  You may shower as soon as the day after surgery  Do not go into pools or hot tubs until cleared by your doctor  Ask when it is safe for you to drive  It is generally safe to drive as soon as your legs are strong, you are off pain medicines and you feel like you will have the appropriate reflexes to step on the breaks in case of an emergency  Ask when you may return to work and to other regular activities      Wound care: Care for your abdominal incisions as directed  Carefully wash around the wound with soap and water  If you have Hibiclens or medicated soap that you were instructed to use before surgery, you may use that to wash with for up to 2 days after surgery  If not, any mild non-scented, non-abrasive soap is safe  It is okay to let the soap and water run over your incision  Do not scrub your incision  Dry the area and put on new, clean bandages as directed  Change your bandages when they get wet or dirty  If you have strips of medical tape, let them fall off on their own  It may take 7 to 14 days for them to fall off  Check your incision every day for redness, swelling, or pus  Deep breathing: Take deep breaths and cough 10 times each hour  This will decrease your risk for a lung infection  Take a deep breath and hold it for as long as you can  Let the air out and then cough strongly  Deep breaths help open your airway  You may be given an incentive spirometer to help you take deep breaths  Put the plastic piece in your mouth and take a slow, deep breath, then let the air out and cough  Repeat these steps 10 times every hour  Get support: This surgery may be life-changing for you and your family  You will no longer be able to get pregnant  Sudden changes in the levels of your hormones may occur and cause mood swings and depression  You may feel angry, sad, or frightened, or cry frequently and unexpectedly  These feelings are normal  Talk to your healthcare provider about where you can get support  You can also ask if hormone replacement medicine is right for you  Follow up with your healthcare provider or gynecologist as directed: You may need to return to have stitches removed, and for other tests  Write down your questions so you remember to ask them during your visits  © 2017 [employer] Information is for End User's use only and may not be sold, redistributed or otherwise used for commercial purposes   All illustrations and images included in CareNotes® are the copyrighted property of [employer]  or [employer]  The above information is an  only  It is not intended as medical advice for individual conditions or treatments  Talk to your doctor, nurse or pharmacist before following any medical regimen to see if it is safe and effective for you

## 2022-11-10 NOTE — INTERVAL H&P NOTE
H&P reviewed  After examining the patient I find no changes in the patients condition since the H&P was  written  Vitals:    11/10/22 1131   BP: 131/69   Pulse: 70   Resp: 14   Temp: 99 4 °F (37 4 °C)   SpO2: 98%     Preoperative testing results noted  Patient agrees to proceed with surgery as planned  All questions answered      Venkata De MD, [address]  11/10/2022  2:51 PM

## 2022-11-10 NOTE — OP NOTE
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 [D39 10]    Procedure(s) (LRB):  ROBOT BILATERAL OOPHORECTOMY, STAGING PERITONEAL BIOPSIES, EXCISION OF NODULAR/ABNORMAL APPEARING PERITONEAL AREAS, INFRACOLIC OMENTECTOMY (N/A)    Specimen(s):  ID Type Source Tests Collected by Time Destination   1 : pelvic peritoneum biopsy Tissue Peritoneum TISSUE EXAM Daniel Hurd MD 11/10/2022 1553    2 : pelvic washing Washing Pelvic Washing NON-GYNECOLOGIC CYTOLOGY Daniel Hurd MD 11/10/2022 1552    3 : pelvic nodularity Tissue Pelvic TISSUE EXAM Daniel Hurd MD 11/10/2022 1607    4 : cul de sac and vaginal peritoneal nodularity Tissue Cul-de-sac TISSUE EXAM Daniel Hurd MD 11/10/2022 1610    5 : bilateral ovaries  Tissue Soft Tissue, Other TISSUE EXAM Daniel Hurd MD 11/10/2022 1629    6 : omentum  Tissue Omentum TISSUE EXAM Daniel Hurd MD 11/10/2022 1630        Estimated Blood Loss:   50 mL    Drains:  Urethral Catheter Non-latex 16 Fr  (Active)   Number of days: 0       Anesthesia Type:   General    Operative Indications:  Patient is status post recent robotic hysterectomy bilateral salpingectomy  Final pathology of fallopian tubes demonstrated evidence of serous borderline tumor implants suggesting ovarian pathology  After counseling, she agreed to complete staging by means of salpingo-oophorectomy, biopsies/omentectomy  Operative Findings:  1  Slightly enlarged ovaries  The right ovary measured approximately 5-6 cm and appear diffusely globulus and nodular  The left ovary measured approximately 4 cm with similar appearance  2  Surgically absent uterus  3  Multiple areas of peritoneal nodularity involving left pelvic sidewall, vaginal cuff/deep pelvis peritoneum  All excised/ablated  4  Grossly normal omentum and no evidence of extrapelvic disease  5  At the completion of the procedure there was no evidence of any gross visible residual disease  Complications:   None    Procedure and Technique:  The patient was taken to the operating room where general endotracheal anesthesia was induced without complications  The patient received antibiotic prophylaxis per hospital protocol  Sequential compression devices were applied to the lower extremities and activated prior to induction of anesthesia  A single dose of preoperative heparin was administered  The patient was placed in the dorsolithotomy position in [address] [place] [address] and her lower abdomen, perineum and vagina were prepped and draped in the usual sterile fashion  A sponge stick was placed in the vagina  Werner catheter was inserted  Attention was turned to the abdomen  All port sites were infiltrated with bupivacaine at the beginning of completion of the procedure  An 8 mm skin incision was made approximately 5 cm above the umbilicus near the midline  Then, using a 5 mm Endopath Excel trocar and the 5 mm laparoscope, the peritoneal cavity was entered under directvisualization  Good intraperitoneal location was confirmed and pneumoperitoneum was created to maximal pressure 15 mm of mercury  A total of four 8 mm robotic trocars were placed (2 on the left, 1 in the midline replacing the 5 mm entry port and 1 on the right) and the 5 mm entry trocar was reinserted in the right flank for assistant's use  The patient was placed in steep Trendelenburg and the Kijubiinci system was docked  The above-mentioned findings were noted  Pelvic washings were collected    The ureters were clearly identified retroperitoneally  Then the peritoneum lateral to the ovarian vessels was divided and the pararectal spaces developed  The ureters' positions were confirmed  Then, the ovarian vessels on either side were cauterized and divided with the vessel sealer device  Additional attachments to the round ligament remnants and pelvic sidewalls were divided using sharp dissection  Then the ovaries were temporally placed in the posterior cul-de-sac  All areas of abnormal appearing pelvic peritoneum as described were excised using sharp dissection  Hemostasis was obtained using electrocautery  In addition, areas of tiny nodularity that were identified during surveillance were also completely cauterized and destroyed  There was no evidence of pelvic residual disease  Attention was then turned to the omentum  Attachments from the omentum to the transverse colon was identified and a window was opened in the midportion of the transverse colon  Then the attachments were divided in either direction with careful attention taken to visualize the colonic wall at all times  The omentum was then temporally placed in the pelvis  The de Brice system was undocked  An approximately 3 cm suprapubic incision was made and the specimens were retrieved individually using a nylon bag without rupture or spillage  Then, the vaginal incision the suprapubic area was closed using a running stitch of 2-0 Stratafix PDO  The subcutaneous fat was closed using interrupted stitches of 2-0 plain gut  Intraperitoneal surveillance at low intraperitoneal pressures confirmed excellent hemostasis  All trocars were removed and the skin was closed using a subcuticular stitch of 4-0 Monocryl and Exofin was applied to all incisions  The patient tolerated the procedure well  Sponge, lap, needle and instrument counts were reported as correct x2  At the time of this dictation the patient remained stable in the operating room awaiting extubation  I was present for the entire procedure    Patient Disposition:  PACU         SIGNATURE: Jazmín Weber MD, Orvis Mon  DATE: November 10, 2022  TIME: 4:38 PM

## 2022-11-11 NOTE — ANESTHESIA POSTPROCEDURE EVALUATION
Post-Op Assessment Note    CV Status:  Stable    Pain management: adequate     Mental Status:  Alert and awake   Hydration Status:  Euvolemic   PONV Controlled:  Controlled   Airway Patency:  Patent      Post Op Vitals Reviewed: Yes      Staff: Anesthesiologist, CRNA         No complications documented      BP      Temp      Pulse     Resp      SpO2      /66   Pulse 90   Temp 99 6 °F (37 6 °C) (Temporal)   Resp 16   Wt 69 6 kg (153 lb 7 oz)   LMP 07/05/2022 (Exact Date)   SpO2 95%   BMI 28 06 kg/m²

## 2022-12-06 ENCOUNTER — OFFICE VISIT (OUTPATIENT)
Dept: GYNECOLOGIC ONCOLOGY | Facility: CLINIC | Age: 47
End: 2022-12-06

## 2022-12-06 VITALS
WEIGHT: 147 LBS | HEART RATE: 77 BPM | DIASTOLIC BLOOD PRESSURE: 82 MMHG | OXYGEN SATURATION: 98 % | SYSTOLIC BLOOD PRESSURE: 142 MMHG | TEMPERATURE: 97 F | BODY MASS INDEX: 27.05 KG/M2 | HEIGHT: 62 IN

## 2022-12-06 DIAGNOSIS — D39.10 SEROUS SURFACE PAPILLARY TUMOR WITH BORDERLINE MALIGNANT FEATURES: Primary | ICD-10-CM

## 2022-12-06 DIAGNOSIS — Z90.722 S/P BSO (BILATERAL SALPINGO-OOPHORECTOMY): ICD-10-CM

## 2022-12-06 DIAGNOSIS — E27.8 ADRENAL MASS (HCC): ICD-10-CM

## 2022-12-06 DIAGNOSIS — E89.40 SURGICAL MENOPAUSE: ICD-10-CM

## 2022-12-06 NOTE — LETTER
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     Final pathology discussed  No evidence of residual or metastatic disease  She will return to the office in 6 months with previsit   Relevant Orders           Other    Adrenal mass ([facility] [address] )     Some laboratory abnormalities but no definitive stigmata to suggest feel or other feel or other urgent condition  I will refer her to Dr Donita Linares to assess whether or not surgical intervention is necessary  He may refer her to endocrinology for further assessment if needed  Relevant Orders    Ambulatory referral to Surgical Oncology    S/P BSO (bilateral salpingo-oophorectomy)     She has fully recovered from surgery  She has no restrictions at this point                CHIEF COMPLAINT:   Postoperative follow-up    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  Staging form: Ovary, Fallopian Tube, Primary Peritoneal, AJCC 8th Edition  - Clinical: FIGO Stage I, calculated as Stage IA (cT1a, cN0, cM0) - Signed by Richard Singh MD on 12/6/2022        Previous therapy:  Oncology History   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   11/1/2022 Initial Diagnosis    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     12/6/2022 -  Cancer Staged    Staging form: Ovary, Fallopian Tube, Primary Peritoneal, AJCC 8th Edition  - Clinical: FIGO Stage I, calculated as Stage IA (cT1a, cN0, cM0) - Signed by Lizette Dobbins MD on 12/6/2022  Histologic grade (G): GB  Histologic grading system: 4 grade system             Patient ID: Danelle Jarquin is a 52 y o  female  HPI  On November 10 patient underwent robotic bilateral salpingo-oophorectomy, staging biopsies and infracolic omentectomy for staging of incidental borderline tumor found on surface of fallopian tube at the time of prior hysterectomy  She presents today for follow-up  Denies vaginal bleeding, drainage or discharge  Some hot flashes  Normal bowel and bladder function  Incisions well-healed  Of note, she also of note, she also had an incidental diagnosis of adrenal mass, laboratory evaluation demonstrated no pheochromocytoma  Some mild normalities noted  She is due for referral to endocrine surgery for consideration of need for surgical intervention    The following portions of the patient's history were reviewed and updated as appropriate: allergies, current medications, past family history, past medical history, past social history, past surgical history and problem list     Review of Systems    Current Outpatient Medications   Medication Sig Dispense Refill   • amLODIPine (NORVASC) 5 mg tablet Take 5 mg by mouth daily     • atenolol (TENORMIN) 25 mg tablet Take 25 mg by mouth 2 (two) times a day     • Calcium-Magnesium-Vitamin D (CALCIUM 1200+D3 PO) Take 1 tablet by mouth daily     • fenofibrate (TRICOR) 145 mg tablet Take 145 mg by mouth daily     • fluticasone (FLONASE) 50 mcg/act nasal spray 1 spray into each nostril daily     • hydrochlorothiazide (HYDRODIURIL) 25 mg tablet Take 25 mg by mouth daily     • losartan (COZAAR) 100 MG tablet      • Multiple Vitamins-Minerals (MULTIVITAMIN WOMEN PO) Take 1 tablet by mouth daily     • potassium chloride (K-DUR,KLOR-CON) 20 mEq tablet Take 20 mEq by mouth daily     • SUMAtriptan (IMITREX) 5 MG/ACT nasal spray 1 spray into each nostril every 2 (two) hours as needed for migraine     • topiramate (TOPAMAX) 50 MG tablet Take 50 mg by mouth every 12 (twelve) hours     • valACYclovir (VALTREX) 1,000 mg tablet Take 1,000 mg by mouth as needed       No current facility-administered medications for this visit  Objective:    Blood pressure 142/82, pulse 77, temperature (!) 97 °F (36 1 °C), height 5' 2" (1 575 m), weight 66 7 kg (147 lb), last menstrual period 07/05/2022, SpO2 98 %  Body mass index is 26 89 kg/m²  Body surface area is 1 68 meters squared  Physical Exam  Vitals reviewed  Exam conducted with a chaperone present  Abdominal:      General: Abdomen is flat  Palpations: Abdomen is soft  Tenderness: There is no abdominal tenderness  Comments: Incisions well-healed  No hernias  Genitourinary:     Comments: Normal external female genitalia  Normal Bartholin's and [place]'s glands  Normal urethral meatus and no evidence of urethral discharge or masses  Bladder without fullness mass or tenderness  Vagina without lesion or discharge  No significant pelvic organ prolapse noted  Cervix and uterus are surgically absent  Bimanual exam demonstrates no evidence of pelvic masses  Anus without fissure of lesion          Stewart Baker MD, [address] [place] [address]  12/6/2022  1:23 PM

## 2022-12-06 NOTE — PATIENT INSTRUCTIONS
You may return to work with no restrictions  Keep appointment with Dr Corey Canavan for evaluation after recommendations for adrenal mass  Return to my office in 6 months with previsit   Call if you have any new symptoms  Call Dr Gabe Ellis to discuss potential nonhormonal interventions for hot flashes

## 2022-12-06 NOTE — ASSESSMENT & PLAN NOTE
Final pathology discussed  No evidence of residual or metastatic disease  She will return to the office in 6 months with previsit

## 2022-12-06 NOTE — PROGRESS NOTES
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     Final pathology discussed  No evidence of residual or metastatic disease  She will return to the office in 6 months with previsit   Relevant Orders           Other    Adrenal mass ([address] )     Some laboratory abnormalities but no definitive stigmata to suggest feel or other feel or other urgent condition  I will refer her to Dr Thu Hanson to assess whether or not surgical intervention is necessary  He may refer her to endocrinology for further assessment if needed  Relevant Orders    Ambulatory referral to Surgical Oncology    S/P BSO (bilateral salpingo-oophorectomy)     She has fully recovered from surgery  She has no restrictions at this point                CHIEF COMPLAINT:   Postoperative follow-up    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  Staging form: Ovary, Fallopian Tube, Primary Peritoneal, AJCC 8th Edition  - Clinical: FIGO Stage I, calculated as Stage IA (cT1a, cN0, cM0) - Signed by Devin Manzano MD on 12/6/2022        Previous therapy:  Oncology History   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   11/1/2022 Initial Diagnosis    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     12/6/2022 -  Cancer Staged    Staging form: Ovary, Fallopian Tube, Primary Peritoneal, AJCC 8th Edition  - Clinical: FIGO Stage I, calculated as Stage IA (cT1a, cN0, cM0) - Signed by Devin Manzano MD on 12/6/2022  Histologic grade (G): GB  Histologic grading system: 4 grade system             Patient ID: Cr Castillo is a 52 y o  female  HPI  On November 10 patient underwent robotic bilateral salpingo-oophorectomy, staging biopsies and infracolic omentectomy for staging of incidental borderline tumor found on surface of fallopian tube at the time of prior hysterectomy  She presents today for follow-up  Denies vaginal bleeding, drainage or discharge  Some hot flashes  Normal bowel and bladder function  Incisions well-healed  Of note, she also of note, she also had an incidental diagnosis of adrenal mass, laboratory evaluation demonstrated no pheochromocytoma  Some mild normalities noted  She is due for referral to endocrine surgery for consideration of need for surgical intervention  The following portions of the patient's history were reviewed and updated as appropriate: allergies, current medications, past family history, past medical history, past social history, past surgical history and problem list     Review of Systems    Current Outpatient Medications   Medication Sig Dispense Refill   • amLODIPine (NORVASC) 5 mg tablet Take 5 mg by mouth daily     • atenolol (TENORMIN) 25 mg tablet Take 25 mg by mouth 2 (two) times a day     • Calcium-Magnesium-Vitamin D (CALCIUM 1200+D3 PO) Take 1 tablet by mouth daily     • fenofibrate (TRICOR) 145 mg tablet Take 145 mg by mouth daily     • fluticasone (FLONASE) 50 mcg/act nasal spray 1 spray into each nostril daily     • hydrochlorothiazide (HYDRODIURIL) 25 mg tablet Take 25 mg by mouth daily     • losartan (COZAAR) 100 MG tablet      • Multiple Vitamins-Minerals (MULTIVITAMIN WOMEN PO) Take 1 tablet by mouth daily     • potassium chloride (K-DUR,KLOR-CON) 20 mEq tablet Take 20 mEq by mouth daily     • SUMAtriptan (IMITREX) 5 MG/ACT nasal spray 1 spray into each nostril every 2 (two) hours as needed for migraine     • topiramate (TOPAMAX) 50 MG tablet Take 50 mg by mouth every 12 (twelve) hours     • valACYclovir (VALTREX) 1,000 mg tablet Take 1,000 mg by mouth as needed       No current facility-administered medications for this visit             Objective:    Blood pressure 142/82, pulse 77, temperature (!) 97 °F (36 1 °C), height 5' 2" (1 575 m), weight 66 7 kg (147 lb), last menstrual period 07/05/2022, SpO2 98 %  Body mass index is 26 89 kg/m²  Body surface area is 1 68 meters squared  Physical Exam  Vitals reviewed  Exam conducted with a chaperone present  Abdominal:      General: Abdomen is flat  Palpations: Abdomen is soft  Tenderness: There is no abdominal tenderness  Comments: Incisions well-healed  No hernias  Genitourinary:     Comments: Normal external female genitalia  Normal Bartholin's and [place]'s glands  Normal urethral meatus and no evidence of urethral discharge or masses  Bladder without fullness mass or tenderness  Vagina without lesion or discharge  No significant pelvic organ prolapse noted  Cervix and uterus are surgically absent  Bimanual exam demonstrates no evidence of pelvic masses  Anus without fissure of lesion          Faraz Cole MD, [facility] [address]  12/6/2022  1:23 PM

## 2022-12-06 NOTE — ASSESSMENT & PLAN NOTE
Patient is somewhat symptomatic and reports hot flashes  Today I discussed strong preference to avoid estrogen replacement therapy if at all possible given recent diagnosis of borderline tumor  I will defer to Dr Vin Luis consideration of nonhormonal management  She will discuss potential options with him

## 2022-12-06 NOTE — ASSESSMENT & PLAN NOTE
Some laboratory abnormalities but no definitive stigmata to suggest feel or other feel or other urgent condition  I will refer her to Dr Maria Alejandra Bell to assess whether or not surgical intervention is necessary  He may refer her to endocrinology for further assessment if needed

## 2022-12-09 ENCOUNTER — DOCUMENTATION (OUTPATIENT)
Dept: HEMATOLOGY ONCOLOGY | Facility: CLINIC | Age: 47
End: 2022-12-09

## 2022-12-09 NOTE — PROGRESS NOTES
Intake received, chart reviewed for need of external records  Imaging completed:11/7/22  All records needed are in patients chart  No further action required of Care Coordination team at this time

## 2022-12-12 ENCOUNTER — DOCUMENTATION (OUTPATIENT)
Dept: HEMATOLOGY ONCOLOGY | Facility: CLINIC | Age: 47
End: 2022-12-12

## 2022-12-20 ENCOUNTER — TELEPHONE (OUTPATIENT)
Dept: SURGICAL ONCOLOGY | Facility: CLINIC | Age: 47
End: 2022-12-20

## 2022-12-20 NOTE — TELEPHONE ENCOUNTER
Spoke to patient to offer early appt tomorrow with Dr Rachel Maher, but patient declined, stating that she has a cold right now

## 2022-12-30 ENCOUNTER — CONSULT (OUTPATIENT)
Dept: SURGICAL ONCOLOGY | Facility: CLINIC | Age: 47
End: 2022-12-30

## 2022-12-30 VITALS
HEIGHT: 62 IN | DIASTOLIC BLOOD PRESSURE: 70 MMHG | SYSTOLIC BLOOD PRESSURE: 120 MMHG | TEMPERATURE: 99.2 F | OXYGEN SATURATION: 98 % | WEIGHT: 147 LBS | HEART RATE: 77 BPM | BODY MASS INDEX: 27.05 KG/M2 | RESPIRATION RATE: 16 BRPM

## 2022-12-30 DIAGNOSIS — E27.8 ADRENAL MASS (HCC): Primary | ICD-10-CM

## 2022-12-30 NOTE — PROGRESS NOTES
Surgical Oncology Consult       [facility] ONCOL[facility]Y [place]  [facility] [address]    Theodore Mills  1975  [phone]  [facility] SURGICAL ONCOLOGY [place]  [address] [address]    Chief Complaint   Patient presents with   • Consult       Assessment/Plan:    No problem-specific Assessment & Plan notes found for this encounter  Diagnoses and all orders for this visit:    Adrenal mass [facility])  -     Ambulatory referral to Surgical Oncology  -     Aldosterone; Future  -     Basic metabolic panel; Future  -     Catecholamines, fractionated, plasma; Future  -     Catecholamines, fractionated, urine, 24 hour; Future  -     Cortisol Level, AM Specimen; Future  -     Cortisol, Free, Urine, 24 Hour; Future  -     Metanephrine, Fractionated Plasma Free; Future  -     Renin Direct Assay; Future        Advance Care Planning/Advance Directives:  Discussed disease status, cancer treatment plans and/or cancer treatment goals with the patient  Oncology History   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   11/1/2022 Initial Diagnosis    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     12/6/2022 -  Cancer Staged    Staging form: Ovary, Fallopian Tube, Primary Peritoneal, AJCC 8th Edition  - Clinical: FIGO Stage I, calculated as Stage IA (cT1a, cN0, cM0) - Signed by Jacinto Butler MD on 12/6/2022  Histologic grade (G): GB  Histologic grading system: 4 grade system           History of Present Illness: Patient is a 57-year-old woman found to have left-sided adrenal nodule after going for imaging regarding ovarian cancer  She is asymptomatic in terms of symptoms of petitions, though she has had issues with blood pressure and is now on 3 medications for hypertension with mixed effect  When, serium potassium levels have been serially low    She has been started on K  Dur  No personal or family history of endocrine malignancies  She had some adrenal labs done a month ago at an outside lab  Review of Systems   Constitutional: Negative  HENT: Negative  Eyes: Negative  Respiratory: Negative  Cardiovascular: Negative  Gastrointestinal: Negative  Endocrine: Negative  Genitourinary: Negative  Musculoskeletal: Negative  Skin: Negative  Allergic/Immunologic: Negative  Neurological: Negative  Hematological: Negative  Psychiatric/Behavioral: Negative  All other systems reviewed and are negative  Patient Active Problem List   Diagnosis   • PONV (postoperative nausea and vomiting)   • HTN (hypertension)   • Hyperlipidemia   • Prediabetes   • Headache   • Adrenal mass (HCC)   • 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   • Family history of cancer   • Hypokalemia   • S/P BSO (bilateral salpingo-oophorectomy)   • Surgical menopause     Past Medical History:   Diagnosis Date   • Abnormal uterine bleeding    • Cervical polyp    • Chronic diarrhea    • Hyperlipidemia    • Hypertension    • Migraine     Pt was started on Effexor for prevention  Pt states it has helped   • PONV (postoperative nausea and vomiting)     Pt reports having times one in    • Post-operative nausea and vomiting     vomited x3 after procedure 8/3/22   • Prediabetes    • Wears contact lenses      Past Surgical History:   Procedure Laterality Date   •  SECTION     • COLONOSCOPY     • FRACTURE SURGERY Right     Pt had a finger articular surgery for repair middle finger     • HYSTERECTOMY     • HYSTERECTOMY  2022   • HYSTEROSCOPY N/A 2021    Procedure: HYSTEROSCOPY, D&C, POLYPECTOMY;  Surgeon: Bam Garzon DO;  Location:  MAIN OR;  Service: Gynecology   • DE LAPS FULG/EXC OVARY VISCERA/PERITONEAL SURFACE N/A 11/10/2022    Procedure: ROBOT BILATERAL OOPHORECTOMY, STAGING PERITONEAL BIOPSIES, EXCISION OF NODULAR/ABNORMAL APPEARING PERITONEAL AREAS, INFRACOLIC OMENTECTOMY;  Surgeon: Payton Patiño MD;  Location: [facility] Main [facility];  Service: Gynecology Oncology   • WA LAPS TOTAL HYSTERECT 250 GM/< W/RMVL TUBE/OVARY N/A 08/03/2022    Procedure: DIAGNOSTIC LAPAROSCOPY, ROBOTIC LAPAROSCOPIC HYSTERECTOMY, BILATERAL SALPINGECTOMY;  Surgeon: Fatimah Galdamez DO;  Location:  MAIN OR;  Service: Gynecology   • SHOULDER ARTHROSCOPY Right      Family History   Problem Relation Age of Onset   • Heart disease Mother    • COPD Mother    • Stroke Father      Social History     Socioeconomic History   • Marital status: /Civil Union     Spouse name: Not on file   • Number of children: Not on file   • Years of education: Not on file   • Highest education level: Not on file   Occupational History   • Not on file   Tobacco Use   • Smoking status: Never   • Smokeless tobacco: Never   Vaping Use   • Vaping Use: Never used   Substance and Sexual Activity   • Alcohol use: Yes     Comment: occasionally   • Drug use: Never   • Sexual activity: Not on file     Comment: did not ask   Other Topics Concern   • Not on file   Social History Narrative   • Not on file     Social Determinants of Health     Financial Resource Strain: Not on file   Food Insecurity: Not on file   Transportation Needs: Not on file   Physical Activity: Not on file   Stress: Not on file   Social Connections: Not on file   Intimate Partner Violence: Not on file   Housing Stability: Not on file       Current Outpatient Medications:   •  amLODIPine (NORVASC) 10 mg tablet, Take 10 mg by mouth daily, Disp: , Rfl:   •  atenolol (TENORMIN) 25 mg tablet, Take 25 mg by mouth 2 (two) times a day, Disp: , Rfl:   •  Calcium-Magnesium-Vitamin D (CALCIUM 1200+D3 PO), Take 1 tablet by mouth daily, Disp: , Rfl:   •  fenofibrate (TRICOR) 145 mg tablet, Take 145 mg by mouth daily, Disp: , Rfl:   •  fluticasone (FLONASE) 50 mcg/act nasal spray, 1 spray into each nostril daily, Disp: , Rfl:   •  hydrochlorothiazide (HYDRODIURIL) 25 mg tablet, Take 25 mg by mouth daily, Disp: , Rfl:   •  losartan (COZAAR) 100 MG tablet, , Disp: , Rfl:   •  Multiple Vitamins-Minerals (MULTIVITAMIN WOMEN PO), Take 1 tablet by mouth daily, Disp: , Rfl:   •  potassium chloride (K-DUR,KLOR-CON) 20 mEq tablet, Take 20 mEq by mouth daily, Disp: , Rfl:   •  SUMAtriptan (IMITREX) 5 MG/ACT nasal spray, 1 spray into each nostril every 2 (two) hours as needed for migraine, Disp: , Rfl:   •  topiramate (TOPAMAX) 50 MG tablet, Take 50 mg by mouth every 12 (twelve) hours, Disp: , Rfl:   •  valACYclovir (VALTREX) 1,000 mg tablet, Take 1,000 mg by mouth as needed, Disp: , Rfl:   Allergies   Allergen Reactions   • Pneumococcal Vaccines Anaphylaxis     Pt states she hallucinated  • Latex Hives     Vitals:    12/30/22 1102   BP: 120/70   Pulse: 77   Resp: 16   Temp: 99 2 °F (37 3 °C)   SpO2: 98%       Physical Exam  Vitals reviewed  Constitutional:       Appearance: Normal appearance  HENT:      Head: Normocephalic and atraumatic  Nose: Nose normal    Eyes:      Extraocular Movements: Extraocular movements intact  Pupils: Pupils are equal, round, and reactive to light  Cardiovascular:      Rate and Rhythm: Normal rate and regular rhythm  Heart sounds: Normal heart sounds  Pulmonary:      Effort: Pulmonary effort is normal       Breath sounds: Normal breath sounds  Abdominal:      General: Abdomen is flat  Palpations: Abdomen is soft  Musculoskeletal:      Cervical back: Normal range of motion and neck supple  Skin:     General: Skin is warm and dry  Neurological:      General: No focal deficit present  Mental Status: She is alert and oriented to person, place, and time     Psychiatric:         Mood and Affect: Mood normal          Behavior: Behavior normal          Pathology:  None    Labs:  Lab Results   Component Value Date    SODIUM 143 11/10/2022    K 3 1 (L) 11/10/2022     11/10/2022    CO2 24 11/10/2022    BUN 12 11/10/2022    CREATININE 0 65 11/10/2022    GLUC 78 11/10/2022    CALCIUM 8 6 11/10/2022      Ref Range & Units 11/2/22  6:15 AM   Metanephrine,Free <=57 pg/mL <25    Comment: This test was developed and its analytical performance   characteristics have been determined by [address] [place] [address]  It has   not been cleared or approved by the [facility]  Food and Drug   Administration  This assay has been validated pursuant   to the CLIA regulations and is used for clinical   purposes  Normetanephrine,Free <=148 pg/mL 39    Comment: This test was developed and its analytical performance   characteristics have been determined by [address] [place] [address]  It has   not been cleared or approved by the [facility]  Food and Drug   Administration  This assay has been validated pursuant   to the CLIA regulations and is used for clinical   purposes  Total,Free (MN+NMN) <=205 pg/mL 39    Comment: For additional information, please refer to   http://QuantaSol/faq/MetFractFree   (This link is being provided for informational/educatio   informational/educational purposes only )   Elevations >4-fold upper reference range: strongly   suggestive of a pheochromocytoma(1)  Elevations >1- 4-fold upper reference range:   significant but not diagnostic, may be due to   medications or stress  Suggest running 24 hr urine   fractionated metanephrines and/or serum Chromagranin A   for confirmation  Reference:   (1)Nigel FERREIRA et al, Plasma Chromogranin A   or Urine Fractionated Metanephrines Follow-Up Testing   Improves the Diagnostic Accuracy of Plasma Fractionated   Metanephrines for Pheochromocytoma  The Journal of   Clinical Endocrinology # Metabolism 93(1), 91-95, 2008     This test was developed and its analytical performance   characteristics have been determined by [address] [place] [address] [place]  It has   not been cleared or approved by the [place]  Food and Drug   Administration  This assay has been validated pursuant   to the CLIA regulations and is used for clinical   purposes  Contains abnormal data ALDOSTERONE/PLASMA RENIN ACTIVITY RATIO, LC/MS/MS  Order: [id number]   Ref Range & Units 11/2/22  6:15 AM   Aldosterone, LC/MS/MS  ng/dL 7    Comment: Unable to flag abnormal result(s), please refer       to reference range(s) below:   Adult Reference Ranges for Aldosterone, LC/MS/MS:       Upright  8:00 - 10:00 am    < or = 28 ng/dL       Upright  4:00 -  6:00 pm    < or = 21 ng/dL       Supine   8:00 - 10:00 am       3 - 16 ng/dL   PRA, LC/MS/MS 0 25 - 5 82 ng/mL/h 0 17 Low     LARON/PRA Ratio 0 9 - 28 9 Ratio 41 2 High     Comment: This test was developed and its analytical performance   characteristics have been determined by [address] [place] [address]  It has   not been cleared or approved by the [place]  Food and Drug   Administration  This assay has been validated pursuant   to the CLIA regulations and is used for clinical   purposes  Imaging  No results found  I reviewed the above laboratory and imaging data  Discussion/Summary: 49-year-old woman, incidentally found left adrenal nodule  Size wise, worrisome  Lab test however suggestive of possible hyperfunctioning gland, specifically aldosteronoma  We talked about possible surgery  We will repeat labs to confirm this potential diagnosis  Otherwise, plan to follow-up after tests to discuss additional treatment including surgery  She would be a candidate for robotic adrenalectomy

## 2022-12-30 NOTE — LETTER
December 30, 2022     Melinda Ford MD  [facility] [id number]    Patient: Cyndi Weinstein   YOB: 1975   Date of Visit: 12/30/2022       Dear Dr Steff Erazo:    Thank you for referring Delmy Vazquez to me for evaluation  Below are my notes for this consultation  If you have questions, please do not hesitate to call me  I look forward to following your patient along with you  Sincerely,        Osvaldo Stuart MD        CC: DO Melinda Worley MD Theron Bair, MD  12/30/2022 12:01 PM  Sign when Signing Visit               Surgical Oncology Consult       [address] [id number]    Cyndi Weinstein  1975  [id number]  [facility]  [address] [place] [id number]    Chief Complaint   Patient presents with   • Consult       Assessment/Plan:    No problem-specific Assessment & Plan notes found for this encounter  Diagnoses and all orders for this visit:    Adrenal mass [facility])  -     Ambulatory referral to Surgical Oncology  -     Aldosterone; Future  -     Basic metabolic panel; Future  -     Catecholamines, fractionated, plasma; Future  -     Catecholamines, fractionated, urine, 24 hour; Future  -     Cortisol Level, AM Specimen; Future  -     Cortisol, Free, Urine, 24 Hour; Future  -     Metanephrine, Fractionated Plasma Free; Future  -     Renin Direct Assay; Future       Advance Care Planning/Advance Directives:  Discussed disease status, cancer treatment plans and/or cancer treatment goals with the patient       Oncology History   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   11/1/2022 Initial Diagnosis    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     12/6/2022 -  Cancer Staged    Staging form: Ovary, Fallopian Tube, Primary Peritoneal, AJCC 8th Edition  - Clinical: FIGO Stage I, calculated as Stage IA (cT1a, cN0, cM0) - Signed by Geovani Barlow MD on 12/6/2022  Histologic grade (G): GB  Histologic grading system: 4 grade system           History of Present Illness: Patient is a 71-year-old woman found to have left-sided adrenal nodule after going for imaging regarding ovarian cancer  She is asymptomatic in terms of symptoms of petitions, though she has had issues with blood pressure and is now on 3 medications for hypertension with mixed effect  When, serium potassium levels have been serially low  She has been started on K  Dur  No personal or family history of endocrine malignancies  She had some adrenal labs done a month ago at an outside lab  Review of Systems   Constitutional: Negative  HENT: Negative  Eyes: Negative  Respiratory: Negative  Cardiovascular: Negative  Gastrointestinal: Negative  Endocrine: Negative  Genitourinary: Negative  Musculoskeletal: Negative  Skin: Negative  Allergic/Immunologic: Negative  Neurological: Negative  Hematological: Negative  Psychiatric/Behavioral: Negative  All other systems reviewed and are negative  Patient Active Problem List   Diagnosis   • PONV (postoperative nausea and vomiting)   • HTN (hypertension)   • Hyperlipidemia   • Prediabetes   • Headache   • Adrenal mass (HCC)   • 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   • Family history of cancer   • Hypokalemia   • S/P BSO (bilateral salpingo-oophorectomy)   • Surgical menopause     Past Medical History:   Diagnosis Date   • Abnormal uterine bleeding    • Cervical polyp    • Chronic diarrhea    • Hyperlipidemia    • Hypertension    • Migraine     Pt was started on Effexor for prevention   Pt states it has helped   • PONV (postoperative nausea and vomiting)     Pt reports having times one in 1992   • Post-operative nausea and vomiting     vomited x3 after procedure 8/3/22 • Prediabetes    • Wears contact lenses      Past Surgical History:   Procedure Laterality Date   •  SECTION     • COLONOSCOPY     • FRACTURE SURGERY Right     Pt had a finger articular surgery for repair middle finger     • HYSTERECTOMY     • HYSTERECTOMY  2022   • HYSTEROSCOPY N/A 2021    Procedure: HYSTEROSCOPY, D&C, POLYPECTOMY;  Surgeon: Bam Garzon DO;  Location:  MAIN OR;  Service: Gynecology   • ND LAPS FULG/EXC OVARY VISCERA/PERITONEAL SURFACE N/A 11/10/2022    Procedure: ROBOT BILATERAL OOPHORECTOMY, STAGING PERITONEAL BIOPSIES, EXCISION OF NODULAR/ABNORMAL APPEARING PERITONEAL AREAS, INFRACOLIC OMENTECTOMY;  Surgeon: Renetta Mcgowan MD;  Location: [facility];  Service: Gynecology Oncology   • ND LAPS TOTAL HYSTERECT 250 GM/< W/RMVL TUBE/OVARY N/A 2022    Procedure: DIAGNOSTIC LAPAROSCOPY, ROBOTIC LAPAROSCOPIC HYSTERECTOMY, BILATERAL SALPINGECTOMY;  Surgeon: Bam Garzon DO;  Location:  MAIN OR;  Service: Gynecology   • SHOULDER ARTHROSCOPY Right      Family History   Problem Relation Age of Onset   • Heart disease Mother    • COPD Mother    • Stroke Father      Social History     Socioeconomic History   • Marital status: /Civil Union     Spouse name: Not on file   • Number of children: Not on file   • Years of education: Not on file   • Highest education level: Not on file   Occupational History   • Not on file   Tobacco Use   • Smoking status: Never   • Smokeless tobacco: Never   Vaping Use   • Vaping Use: Never used   Substance and Sexual Activity   • Alcohol use: Yes     Comment: occasionally   • Drug use: Never   • Sexual activity: Not on file     Comment: did not ask   Other Topics Concern   • Not on file   Social History Narrative   • Not on file     Social Determinants of Health     Financial Resource Strain: Not on file   Food Insecurity: Not on file   Transportation Needs: Not on file   Physical Activity: Not on file   Stress: Not on file   Social Connections: Not on file   Intimate Partner Violence: Not on file   Housing Stability: Not on file       Current Outpatient Medications:   •  amLODIPine (NORVASC) 10 mg tablet, Take 10 mg by mouth daily, Disp: , Rfl:   •  atenolol (TENORMIN) 25 mg tablet, Take 25 mg by mouth 2 (two) times a day, Disp: , Rfl:   •  Calcium-Magnesium-Vitamin D (CALCIUM 1200+D3 PO), Take 1 tablet by mouth daily, Disp: , Rfl:   •  fenofibrate (TRICOR) 145 mg tablet, Take 145 mg by mouth daily, Disp: , Rfl:   •  fluticasone (FLONASE) 50 mcg/act nasal spray, 1 spray into each nostril daily, Disp: , Rfl:   •  hydrochlorothiazide (HYDRODIURIL) 25 mg tablet, Take 25 mg by mouth daily, Disp: , Rfl:   •  losartan (COZAAR) 100 MG tablet, , Disp: , Rfl:   •  Multiple Vitamins-Minerals (MULTIVITAMIN WOMEN PO), Take 1 tablet by mouth daily, Disp: , Rfl:   •  potassium chloride (K-DUR,KLOR-CON) 20 mEq tablet, Take 20 mEq by mouth daily, Disp: , Rfl:   •  SUMAtriptan (IMITREX) 5 MG/ACT nasal spray, 1 spray into each nostril every 2 (two) hours as needed for migraine, Disp: , Rfl:   •  topiramate (TOPAMAX) 50 MG tablet, Take 50 mg by mouth every 12 (twelve) hours, Disp: , Rfl:   •  valACYclovir (VALTREX) 1,000 mg tablet, Take 1,000 mg by mouth as needed, Disp: , Rfl:   Allergies   Allergen Reactions   • Pneumococcal Vaccines Anaphylaxis     Pt states she hallucinated  • Latex Hives     Vitals:    12/30/22 1102   BP: 120/70   Pulse: 77   Resp: 16   Temp: 99 2 °F (37 3 °C)   SpO2: 98%       Physical Exam  Vitals reviewed  Constitutional:       Appearance: Normal appearance  HENT:      Head: Normocephalic and atraumatic  Nose: Nose normal    Eyes:      Extraocular Movements: Extraocular movements intact  Pupils: Pupils are equal, round, and reactive to light  Cardiovascular:      Rate and Rhythm: Normal rate and regular rhythm  Heart sounds: Normal heart sounds     Pulmonary:      Effort: Pulmonary effort is normal  Breath sounds: Normal breath sounds  Abdominal:      General: Abdomen is flat  Palpations: Abdomen is soft  Musculoskeletal:      Cervical back: Normal range of motion and neck supple  Skin:     General: Skin is warm and dry  Neurological:      General: No focal deficit present  Mental Status: She is alert and oriented to person, place, and time  Psychiatric:         Mood and Affect: Mood normal          Behavior: Behavior normal          Pathology:  None    Labs:  Lab Results   Component Value Date    SODIUM 143 11/10/2022    K 3 1 (L) 11/10/2022     11/10/2022    CO2 24 11/10/2022    BUN 12 11/10/2022    CREATININE 0 65 11/10/2022    GLUC 78 11/10/2022    CALCIUM 8 6 11/10/2022      Ref Range & Units 11/2/22  6:15 AM   Metanephrine,Free <=57 pg/mL <25    Comment: This test was developed and its analytical performance   characteristics have been determined by [address] [place] [address]  It has   not been cleared or approved by the [place]  Food and Drug   Administration  This assay has been validated pursuant   to the CLIA regulations and is used for clinical   purposes  Normetanephrine,Free <=148 pg/mL 39    Comment: This test was developed and its analytical performance   characteristics have been determined by [address] [place] [address]  It has   not been cleared or approved by the [place]  Food and Drug   Administration  This assay has been validated pursuant   to the CLIA regulations and is used for clinical   purposes  Total,Free (MN+NMN) <=205 pg/mL 39    Comment: For additional information, please refer to   http://FedTax/faq/MetFractFree   (This link is being provided for informational/educatio   informational/educational purposes only )   Elevations >4-fold upper reference range: strongly   suggestive of a pheochromocytoma(1)     Elevations >1- 4-fold upper reference range:   significant but not diagnostic, may be due to   medications or stress  Suggest running 24 hr urine   fractionated metanephrines and/or serum Chromagranin A   for confirmation  Reference:   (1)Nigel FERREIRA et al, Plasma Chromogranin A   or Urine Fractionated Metanephrines Follow-Up Testing   Improves the Diagnostic Accuracy of Plasma Fractionated   Metanephrines for Pheochromocytoma  The Journal of   Clinical Endocrinology # Metabolism 93(1), 91-95, 2008  This test was developed and its analytical performance   characteristics have been determined by [address] [place] [address]  It has   not been cleared or approved by the [place]  Food and Drug   Administration  This assay has been validated pursuant   to the CLIA regulations and is used for clinical   purposes  Contains abnormal data ALDOSTERONE/PLASMA RENIN ACTIVITY RATIO, LC/MS/MS  Order: [id number]   Ref Range & Units 11/2/22  6:15 AM   Aldosterone, LC/MS/MS  ng/dL 7    Comment: Unable to flag abnormal result(s), please refer       to reference range(s) below:   Adult Reference Ranges for Aldosterone, LC/MS/MS:       Upright  8:00 - 10:00 am    < or = 28 ng/dL       Upright  4:00 -  6:00 pm    < or = 21 ng/dL       Supine   8:00 - 10:00 am       3 - 16 ng/dL   PRA, LC/MS/MS 0 25 - 5 82 ng/mL/h 0 17 Low     LARON/PRA Ratio 0 9 - 28 9 Ratio 41 2 High     Comment: This test was developed and its analytical performance   characteristics have been determined by [address] [place] [address]  It has   not been cleared or approved by the [place]  Food and Drug   Administration  This assay has been validated pursuant   to the CLIA regulations and is used for clinical   purposes  Imaging  No results found  I reviewed the above laboratory and imaging data  Discussion/Summary: 15-year-old woman, incidentally found left adrenal nodule  Size wise, worrisome    Lab test however suggestive of possible hyperfunctioning gland, specifically aldosteronoma  We talked about possible surgery  We will repeat labs to confirm this potential diagnosis  Otherwise, plan to follow-up after tests to discuss additional treatment including surgery  She would be a candidate for robotic adrenalectomy

## 2023-01-02 ENCOUNTER — APPOINTMENT (OUTPATIENT)
Dept: LAB | Facility: HOSPITAL | Age: 48
End: 2023-01-02

## 2023-01-02 DIAGNOSIS — E27.8 ADRENAL MASS (HCC): ICD-10-CM

## 2023-01-02 LAB
ANION GAP SERPL CALCULATED.3IONS-SCNC: 9 MMOL/L (ref 4–13)
BUN SERPL-MCNC: 19 MG/DL (ref 5–25)
CALCIUM SERPL-MCNC: 8.7 MG/DL (ref 8.3–10.1)
CHLORIDE SERPL-SCNC: 107 MMOL/L (ref 96–108)
CO2 SERPL-SCNC: 26 MMOL/L (ref 21–32)
CORTIS AM PEAK SERPL-MCNC: 13 UG/DL (ref 4.2–22.4)
CREAT SERPL-MCNC: 0.78 MG/DL (ref 0.6–1.3)
GFR SERPL CREATININE-BSD FRML MDRD: 90 ML/MIN/1.73SQ M
GLUCOSE P FAST SERPL-MCNC: 127 MG/DL (ref 65–99)
POTASSIUM SERPL-SCNC: 3 MMOL/L (ref 3.5–5.3)
SODIUM SERPL-SCNC: 142 MMOL/L (ref 135–147)

## 2023-01-05 LAB
METANEPH FREE SERPL-MCNC: 11.7 PG/ML (ref 0–88)
NORMETANEPHRINE SERPL-MCNC: 48.2 PG/ML (ref 0–218.9)

## 2023-01-06 LAB
ALDOST SERPL-MCNC: 10.2 NG/DL (ref 0–30)
DOPAMINE 24H UR-MRATE: 441 UG/24 HR (ref 0–510)
DOPAMINE 24H UR-MRATE: <30 PG/ML (ref 0–48)
DOPAMINE UR-MCNC: 215 UG/L
EPINEPH 24H UR-MRATE: <2 UG/24 HR (ref 0–20)
EPINEPH PLAS-MCNC: 20 PG/ML (ref 0–62)
EPINEPH UR-MCNC: <1 UG/L
NOREPINEPH 24H UR-MRATE: 23 UG/24 HR (ref 0–135)
NOREPINEPH PLAS-MCNC: 367 PG/ML (ref 0–874)
NOREPINEPH UR-MCNC: 11 UG/L

## 2023-01-08 LAB
CORTIS F 24H UR-MRATE: 6 UG/24 HR (ref 6–42)
CORTIS F UR-MCNC: 3 UG/L

## 2023-01-21 ENCOUNTER — APPOINTMENT (OUTPATIENT)
Dept: LAB | Facility: HOSPITAL | Age: 48
End: 2023-01-21

## 2023-01-21 DIAGNOSIS — E27.8 ADRENAL MASS (HCC): ICD-10-CM

## 2023-01-24 ENCOUNTER — DOCUMENTATION (OUTPATIENT)
Dept: HEMATOLOGY ONCOLOGY | Facility: CLINIC | Age: 48
End: 2023-01-24

## 2023-01-24 NOTE — PROGRESS NOTES
Per notes on acct pt had surgery w/Dr HURD & a post op appt & she has a f/u in 6 months  Pt also had a consult w/Dr Lorelei Jimenez & she has a f/u appt 02/01/23 & at that time they will decide what the plan will be  Will revw & f/u if needed

## 2023-01-26 ENCOUNTER — TELEPHONE (OUTPATIENT)
Dept: SURGICAL ONCOLOGY | Facility: CLINIC | Age: 48
End: 2023-01-26

## 2023-01-26 LAB — RENIN PLAS-CCNC: <0.167 NG/ML/HR (ref 0.17–5.38)

## 2023-02-01 ENCOUNTER — APPOINTMENT (OUTPATIENT)
Dept: LAB | Facility: MEDICAL CENTER | Age: 48
End: 2023-02-01

## 2023-02-01 ENCOUNTER — LAB REQUISITION (OUTPATIENT)
Dept: LAB | Facility: HOSPITAL | Age: 48
End: 2023-02-01

## 2023-02-01 ENCOUNTER — CLINICAL SUPPORT (OUTPATIENT)
Dept: URGENT CARE | Facility: MEDICAL CENTER | Age: 48
End: 2023-02-01
Payer: COMMERCIAL

## 2023-02-01 ENCOUNTER — OFFICE VISIT (OUTPATIENT)
Dept: SURGICAL ONCOLOGY | Facility: CLINIC | Age: 48
End: 2023-02-01

## 2023-02-01 VITALS
SYSTOLIC BLOOD PRESSURE: 136 MMHG | RESPIRATION RATE: 16 BRPM | TEMPERATURE: 99 F | OXYGEN SATURATION: 98 % | DIASTOLIC BLOOD PRESSURE: 68 MMHG | HEART RATE: 91 BPM | BODY MASS INDEX: 26.87 KG/M2 | WEIGHT: 146 LBS | HEIGHT: 62 IN

## 2023-02-01 DIAGNOSIS — E27.8 ADRENAL MASS (HCC): ICD-10-CM

## 2023-02-01 DIAGNOSIS — E27.8 ADRENAL MASS (HCC): Primary | ICD-10-CM

## 2023-02-01 DIAGNOSIS — E27.8 OTHER SPECIFIED DISORDERS OF ADRENAL GLAND (HCC): ICD-10-CM

## 2023-02-01 LAB
ABO GROUP BLD: NORMAL
ALBUMIN SERPL BCP-MCNC: 4.2 G/DL (ref 3.5–5)
ALP SERPL-CCNC: 64 U/L (ref 46–116)
ALT SERPL W P-5'-P-CCNC: 85 U/L (ref 12–78)
ANION GAP SERPL CALCULATED.3IONS-SCNC: 9 MMOL/L (ref 4–13)
APTT PPP: 25 SECONDS (ref 23–37)
AST SERPL W P-5'-P-CCNC: 52 U/L (ref 5–45)
BASOPHILS # BLD AUTO: 0.07 THOUSANDS/ÂΜL (ref 0–0.1)
BASOPHILS NFR BLD AUTO: 1 % (ref 0–1)
BILIRUB SERPL-MCNC: 0.57 MG/DL (ref 0.2–1)
BLD GP AB SCN SERPL QL: NEGATIVE
BUN SERPL-MCNC: 13 MG/DL (ref 5–25)
CALCIUM SERPL-MCNC: 9.5 MG/DL (ref 8.3–10.1)
CHLORIDE SERPL-SCNC: 108 MMOL/L (ref 96–108)
CO2 SERPL-SCNC: 26 MMOL/L (ref 21–32)
CREAT SERPL-MCNC: 0.81 MG/DL (ref 0.6–1.3)
EOSINOPHIL # BLD AUTO: 0.14 THOUSAND/ÂΜL (ref 0–0.61)
EOSINOPHIL NFR BLD AUTO: 2 % (ref 0–6)
ERYTHROCYTE [DISTWIDTH] IN BLOOD BY AUTOMATED COUNT: 14.2 % (ref 11.6–15.1)
GFR SERPL CREATININE-BSD FRML MDRD: 86 ML/MIN/1.73SQ M
GLUCOSE SERPL-MCNC: 129 MG/DL (ref 65–140)
HCT VFR BLD AUTO: 42.1 % (ref 34.8–46.1)
HGB BLD-MCNC: 13.9 G/DL (ref 11.5–15.4)
IMM GRANULOCYTES # BLD AUTO: 0.02 THOUSAND/UL (ref 0–0.2)
IMM GRANULOCYTES NFR BLD AUTO: 0 % (ref 0–2)
INR PPP: 0.98 (ref 0.84–1.19)
LYMPHOCYTES # BLD AUTO: 2.48 THOUSANDS/ÂΜL (ref 0.6–4.47)
LYMPHOCYTES NFR BLD AUTO: 34 % (ref 14–44)
MCH RBC QN AUTO: 29 PG (ref 26.8–34.3)
MCHC RBC AUTO-ENTMCNC: 33 G/DL (ref 31.4–37.4)
MCV RBC AUTO: 88 FL (ref 82–98)
MONOCYTES # BLD AUTO: 0.59 THOUSAND/ÂΜL (ref 0.17–1.22)
MONOCYTES NFR BLD AUTO: 8 % (ref 4–12)
NEUTROPHILS # BLD AUTO: 3.94 THOUSANDS/ÂΜL (ref 1.85–7.62)
NEUTS SEG NFR BLD AUTO: 55 % (ref 43–75)
NRBC BLD AUTO-RTO: 0 /100 WBCS
PLATELET # BLD AUTO: 378 THOUSANDS/UL (ref 149–390)
PMV BLD AUTO: 9.9 FL (ref 8.9–12.7)
POTASSIUM SERPL-SCNC: 2.8 MMOL/L (ref 3.5–5.3)
PROT SERPL-MCNC: 7.8 G/DL (ref 6.4–8.4)
PROTHROMBIN TIME: 13.2 SECONDS (ref 11.6–14.5)
RBC # BLD AUTO: 4.8 MILLION/UL (ref 3.81–5.12)
RH BLD: POSITIVE
SODIUM SERPL-SCNC: 143 MMOL/L (ref 135–147)
SPECIMEN EXPIRATION DATE: NORMAL
WBC # BLD AUTO: 7.24 THOUSAND/UL (ref 4.31–10.16)

## 2023-02-01 PROCEDURE — 93005 ELECTROCARDIOGRAM TRACING: CPT | Performed by: FAMILY MEDICINE

## 2023-02-01 RX ORDER — ATENOLOL 50 MG/1
TABLET ORAL
COMMUNITY
Start: 2023-01-13 | End: 2023-02-06

## 2023-02-01 RX ORDER — METHYLPREDNISOLONE 4 MG/1
TABLET ORAL
COMMUNITY
Start: 2023-02-01 | End: 2023-02-10

## 2023-02-01 RX ORDER — CEFAZOLIN SODIUM 1 G/50ML
1000 SOLUTION INTRAVENOUS ONCE
Status: CANCELLED | OUTPATIENT
Start: 2023-02-01 | End: 2023-02-01

## 2023-02-01 NOTE — H&P (VIEW-ONLY)
Surgical Oncology Follow Up       [facility] SURGICAL ONCOLOGY [place]  [facility] [address]    Vero Avalos  1975  [id number]  [facility] SURGICAL ONCOLOGY [place]  [place] [id number]    Chief Complaint   Patient presents with   • Follow-up       Assessment/Plan:    No problem-specific Assessment & Plan notes found for this encounter  Diagnoses and all orders for this visit:    Adrenal mass ([address] )    Other orders  -     loratadine-pseudoephedrine (CLARITIN-D 12-HOUR) 5-120 mg per tablet; Take 1 tablet by mouth 2 (two) times a day  -     methylPREDNISolone 4 MG tablet therapy pack; follow package directions  -     atenolol (TENORMIN) 50 mg tablet        Advance Care Planning/Advance Directives:  Discussed disease status, cancer treatment plans and/or cancer treatment goals with the patient  Oncology History   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   11/1/2022 Initial Diagnosis    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     12/6/2022 -  Cancer Staged    Staging form: Ovary, Fallopian Tube, Primary Peritoneal, AJCC 8th Edition  - Clinical: FIGO Stage I, calculated as Stage IA (cT1a, cN0, cM0) - Signed by Lucius Quintero MD on 12/6/2022  Histologic grade (G): GB  Histologic grading system: 4 grade system           History of Present Illness: 70-year-old woman with incidental left-sided adrenal nodule, possibly from a reactive preop visit   -Interval History: No complaints since her last visit  Review of Systems:  Review of Systems   Constitutional: Negative  HENT: Negative  Eyes: Negative  Respiratory: Negative  Cardiovascular: Negative  Gastrointestinal: Negative  Endocrine: Negative  Genitourinary: Negative  Musculoskeletal: Negative  Skin: Negative  Allergic/Immunologic: Negative      Neurological: Negative  Hematological: Negative  Psychiatric/Behavioral: Negative  Patient Active Problem List   Diagnosis   • PONV (postoperative nausea and vomiting)   • HTN (hypertension)   • Hyperlipidemia   • Prediabetes   • Headache   • Adrenal mass (HCC)   • 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   • Family history of cancer   • Hypokalemia   • S/P BSO (bilateral salpingo-oophorectomy)   • Surgical menopause     Past Medical History:   Diagnosis Date   • Abnormal uterine bleeding    • Cervical polyp    • Chronic diarrhea    • Hyperlipidemia    • Hypertension    • Migraine     Pt was started on Effexor for prevention  Pt states it has helped   • PONV (postoperative nausea and vomiting)     Pt reports having times one in    • Post-operative nausea and vomiting     vomited x3 after procedure 8/3/22   • Prediabetes    • Wears contact lenses      Past Surgical History:   Procedure Laterality Date   •  SECTION     • COLONOSCOPY     • FRACTURE SURGERY Right     Pt had a finger articular surgery for repair middle finger     • HYSTERECTOMY     • HYSTERECTOMY  2022   • HYSTEROSCOPY N/A 2021    Procedure: HYSTEROSCOPY, D&C, POLYPECTOMY;  Surgeon: Charlee Escamilla DO;  Location:  MAIN OR;  Service: Gynecology   • DC LAPS FULG/EXC OVARY VISCERA/PERITONEAL SURFACE N/A 11/10/2022    Procedure: ROBOT BILATERAL OOPHORECTOMY, STAGING PERITONEAL BIOPSIES, EXCISION OF NODULAR/ABNORMAL APPEARING PERITONEAL AREAS, INFRACOLIC OMENTECTOMY;  Surgeon: Yue Taylor MD;  Location: [facility];  Service: Gynecology Oncology   • DC LAPS TOTAL HYSTERECT 250 GM/< W/RMVL TUBE/OVARY N/A 2022    Procedure: DIAGNOSTIC LAPAROSCOPY, ROBOTIC LAPAROSCOPIC HYSTERECTOMY, BILATERAL SALPINGECTOMY;  Surgeon: Charlee Escamilla DO;  Location:  MAIN OR;  Service: Gynecology   • SHOULDER ARTHROSCOPY Right      Family History   Problem Relation Age of Onset   • Heart disease Mother • COPD Mother    • Stroke Father    • Leukemia Maternal Grandfather    • Ovarian cancer Cousin      Social History     Socioeconomic History   • Marital status: /Civil Union     Spouse name: Not on file   • Number of children: Not on file   • Years of education: Not on file   • Highest education level: Not on file   Occupational History   • Not on file   Tobacco Use   • Smoking status: Never   • Smokeless tobacco: Never   Vaping Use   • Vaping Use: Never used   Substance and Sexual Activity   • Alcohol use: Yes     Comment: occasionally   • Drug use: Never   • Sexual activity: Not on file     Comment: did not ask   Other Topics Concern   • Not on file   Social History Narrative   • Not on file     Social Determinants of Health     Financial Resource Strain: Not on file   Food Insecurity: Not on file   Transportation Needs: Not on file   Physical Activity: Not on file   Stress: Not on file   Social Connections: Not on file   Intimate Partner Violence: Not on file   Housing Stability: Not on file       Current Outpatient Medications:   •  amLODIPine (NORVASC) 10 mg tablet, Take 10 mg by mouth daily, Disp: , Rfl:   •  atenolol (TENORMIN) 25 mg tablet, Take 25 mg by mouth 2 (two) times a day, Disp: , Rfl:   •  atenolol (TENORMIN) 50 mg tablet, , Disp: , Rfl:   •  Calcium-Magnesium-Vitamin D (CALCIUM 1200+D3 PO), Take 1 tablet by mouth daily, Disp: , Rfl:   •  fenofibrate (TRICOR) 145 mg tablet, Take 145 mg by mouth daily, Disp: , Rfl:   •  fluticasone (FLONASE) 50 mcg/act nasal spray, 1 spray into each nostril daily, Disp: , Rfl:   •  hydrochlorothiazide (HYDRODIURIL) 25 mg tablet, Take 25 mg by mouth daily, Disp: , Rfl:   •  loratadine-pseudoephedrine (CLARITIN-D 12-HOUR) 5-120 mg per tablet, Take 1 tablet by mouth 2 (two) times a day, Disp: , Rfl:   •  losartan (COZAAR) 100 MG tablet, , Disp: , Rfl:   •  methylPREDNISolone 4 MG tablet therapy pack, follow package directions, Disp: , Rfl:   •  Multiple Vitamins-Minerals (MULTIVITAMIN WOMEN PO), Take 1 tablet by mouth daily, Disp: , Rfl:   •  potassium chloride (K-DUR,KLOR-CON) 20 mEq tablet, Take 20 mEq by mouth daily, Disp: , Rfl:   •  SUMAtriptan (IMITREX) 5 MG/ACT nasal spray, 1 spray into each nostril every 2 (two) hours as needed for migraine, Disp: , Rfl:   •  topiramate (TOPAMAX) 50 MG tablet, Take 50 mg by mouth every 12 (twelve) hours, Disp: , Rfl:   •  valACYclovir (VALTREX) 1,000 mg tablet, Take 1,000 mg by mouth as needed, Disp: , Rfl:   Allergies   Allergen Reactions   • Pneumococcal Vaccines Anaphylaxis     Pt states she hallucinated  • Latex Hives     Vitals:    02/01/23 1550   BP: 136/68   Pulse: 91   Resp: 16   Temp: 99 °F (37 2 °C)   SpO2: 98%       Physical Exam  Vitals reviewed  Constitutional:       Appearance: Normal appearance  HENT:      Head: Atraumatic  Right Ear: External ear normal       Left Ear: External ear normal    Eyes:      Extraocular Movements: Extraocular movements intact  Pupils: Pupils are equal, round, and reactive to light  Cardiovascular:      Rate and Rhythm: Normal rate and regular rhythm  Heart sounds: Normal heart sounds  Pulmonary:      Effort: Pulmonary effort is normal       Breath sounds: Normal breath sounds  Abdominal:      General: Abdomen is flat  Palpations: Abdomen is soft  Musculoskeletal:         General: Normal range of motion  Cervical back: Normal range of motion and neck supple  Skin:     General: Skin is warm and dry  Neurological:      General: No focal deficit present  Mental Status: She is alert and oriented to person, place, and time     Psychiatric:         Mood and Affect: Mood normal          Behavior: Behavior normal            Results:  Labs:  Component Ref Range & Units 1/21/23  8:39 AM    Renin 0 167 - 5 380 ng/mL/hr <0 167      Component Ref Range & Units 1/2/23  8:50 AM    Normetanephrine, Free 0 0 - 218 9 pg/mL 48 2    Metanephrine, Free 0 0 - 88 0 pg/mL 11 7      Component Ref Range & Units 1/2/23  8:50 AM    Cortisol - AM 4 2 - 22 4 ug/dL 13 0     Component Ref Range & Units 1/2/23  8:50 AM    Norepinephrine Plasma 0 - 874 pg/mL 367    Epinephrine Plasma 0 - 62 pg/mL 20    Dopamine Plasma 0 - 48 pg/mL <30        Component Ref Range & Units 1/2/23  8:50 AM    Aldosterone 0 0 - 30 0 ng/dL 10 2     This SmartLink has not been configured with any valid records  Lab Results   Component Value Date    SODIUM 142 01/02/2023    K 3 0 (L) 01/02/2023     01/02/2023    CO2 26 01/02/2023    BUN 19 01/02/2023    CREATININE 0 78 01/02/2023    GLUC 78 11/10/2022    CALCIUM 8 7 01/02/2023         Imaging  CT ABDOMEN AND PELVIS WITH AND WITHOUT IV CONTRAST     INDICATION:   E27 8: Other specified disorders of adrenal gland  History of hysterectomy and bilateral salpingectomy for abnormal uterine bleeding; borderline serous features of left fallopian tube; Incidental left adrenal mass     COMPARISON:  8/24/2022 CT     TECHNIQUE: Initial CT of the abdomen and pelvis was performed without intravenous contrast   Subsequent dynamic CT evaluation of the abdomen and pelvis was performed in both portal phase as well as 15 minute delayed imaging as per adrenal protocol  Axial, sagittal, and coronal 2D reformatted images were created from the source data and submitted for interpretation       Radiation dose length product (DLP) for this visit:  1524 mGy-cm   This examination, like all CT scans performed in the [facility], was performed utilizing techniques to minimize radiation dose exposure, including the use of iterative   reconstruction and automated exposure control      IV Contrast:  100 mL of iohexol (OMNIPAQUE)  Enteric Contrast:  Enteric contrast was not administered      FINDINGS:     ABDOMEN     RIGHT KIDNEY AND URETER:  No solid renal mass  No detectable urothelial mass  No hydronephrosis or hydroureter  No urinary tract calculi  No perinephric collection      LEFT KIDNEY AND URETER:  No solid renal mass  No detectable urothelial mass  No hydronephrosis or hydroureter  No urinary tract calculi  No perinephric collection      URINARY BLADDER:  No bladder wall mass  No calculi         LOWER CHEST:  No clinically significant abnormality identified in the visualized lower chest   Small groundglass nodule is not included on the field-of-view on the current examination      LIVER/BILIARY TREE:  Fatty infiltration of liver is noted      GALLBLADDER:  No calcified gallstones  No pericholecystic inflammatory change      SPLEEN:  Unremarkable      PANCREAS:  Unremarkable      ADRENAL GLANDS:  The right adrenal is unremarkable      There is a 2 2 x 1 6 cm left adrenal nodule similar to the prior study  Precontrast density minus 4HU  Portal phase 68 HU  Delayed 16 HU  The adrenal gland is homogeneous  Low Hounsfield density and absolute washout of 81% is consistent with an adenoma      STOMACH AND BOWEL:  Unremarkable      APPENDIX:  A normal appendix was visualized      ABDOMINOPELVIC CAVITY:  No ascites  No free intraperitoneal air  No lymphadenopathy      VESSELS:  Unremarkable for patient's age      PELVIS     REPRODUCTIVE ORGANS:  The patient is status post hysterectomy  There is an enhancing the right ovary most likely prior examination  The left ovary is not enlarged  A small follicle may be present      ABDOMINAL WALL/INGUINAL REGIONS:  Small periumbilical hernia of fat      OSSEOUS STRUCTURES:  No acute fracture or destructive osseous lesion  Well-circumscribed dense nodule involving the pedicle of T12 is noted on image 29, series 3 with suggestion of slight spiculation  Findings are most likely related to a bone island  This is unchanged from the prior study  A similar lesion is seen T11 to the right of midline  Sclerotic osseous metastatic disease would be unusual with endometrial carcinoma    A few punctate areas are also seen in the pelvis      IMPRESSION:     2 2 cm left adrenal nodule is consistent with an adenoma  Correlation with endocrinologic studies should be considered      No evidence of mass or adenopathy elsewhere      Status post hysterectomy with cyst in the right ovary      Small groundglass right lung nodule was not included on the field-of-view on the current examination      A few sclerotic lesions are noted in the spine most likely related to bone islands  Continued surveillance is advised  If there is high clinical concern, bone scan could be performed      Workstation performed: [id number]     I reviewed the above laboratory and imaging data  Discussion/Summary: 79-year-old woman, suspected hyperaldosteronism with left-sided adrenal nodule  Plan middle invasive possible open left adrenalectomy  All questions answered and consent signed at this visit

## 2023-02-01 NOTE — LETTER
February 1, 2023     Dimas Kaylee,   [address] [facility]    Patient: Angelika Bailey   YOB: 1975   Date of Visit: 2/1/2023       Dear Dr Torey Dao: Thank you for referring Tori Pierce to me for evaluation  Below are my notes for this consultation  If you have questions, please do not hesitate to call me  I look forward to following your patient along with you  Sincerely,        Catarina Cintron MD        CC: MD Catarina Fisher Cera, MD  2/1/2023  4:34 PM  Sign when Signing Visit     Surgical Oncology Follow Up       [address] [address]    Angelika Bailey  1975  [id number]  [facility] Norm Borden  [address] [facility] [address]    Chief Complaint   Patient presents with   • Follow-up       Assessment/Plan:    No problem-specific Assessment & Plan notes found for this encounter  Diagnoses and all orders for this visit:    Adrenal mass ([facility] [address] )    Other orders  -     loratadine-pseudoephedrine (CLARITIN-D 12-HOUR) 5-120 mg per tablet; Take 1 tablet by mouth 2 (two) times a day  -     methylPREDNISolone 4 MG tablet therapy pack; follow package directions  -     atenolol (TENORMIN) 50 mg tablet       Advance Care Planning/Advance Directives:  Discussed disease status, cancer treatment plans and/or cancer treatment goals with the patient       Oncology History   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   11/1/2022 Initial Diagnosis    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     12/6/2022 -  Cancer Staged    Staging form: Ovary, Fallopian Tube, Primary Peritoneal, AJCC 8th Edition  - Clinical: FIGO Stage I, calculated as Stage IA (cT1a, cN0, cM0) - Signed by Natalia Boyer MD on 12/6/2022  Histologic grade (G): GB  Histologic grading system: 4 grade system           History of Present Illness: 70-year-old woman with incidental left-sided adrenal nodule, possibly from a reactive preop visit   -Interval History: No complaints since her last visit  Review of Systems:  Review of Systems   Constitutional: Negative  HENT: Negative  Eyes: Negative  Respiratory: Negative  Cardiovascular: Negative  Gastrointestinal: Negative  Endocrine: Negative  Genitourinary: Negative  Musculoskeletal: Negative  Skin: Negative  Allergic/Immunologic: Negative  Neurological: Negative  Hematological: Negative  Psychiatric/Behavioral: Negative  Patient Active Problem List   Diagnosis   • PONV (postoperative nausea and vomiting)   • HTN (hypertension)   • Hyperlipidemia   • Prediabetes   • Headache   • Adrenal mass (HCC)   • 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   • Family history of cancer   • Hypokalemia   • S/P BSO (bilateral salpingo-oophorectomy)   • Surgical menopause     Past Medical History:   Diagnosis Date   • Abnormal uterine bleeding    • Cervical polyp    • Chronic diarrhea    • Hyperlipidemia    • Hypertension    • Migraine     Pt was started on Effexor for prevention  Pt states it has helped   • PONV (postoperative nausea and vomiting)     Pt reports having times one in    • Post-operative nausea and vomiting     vomited x3 after procedure 8/3/22   • Prediabetes    • Wears contact lenses      Past Surgical History:   Procedure Laterality Date   •  SECTION     • COLONOSCOPY     • FRACTURE SURGERY Right     Pt had a finger articular surgery for repair middle finger     • HYSTERECTOMY     • HYSTERECTOMY  2022   • HYSTEROSCOPY N/A 2021    Procedure: HYSTEROSCOPY, D&C, POLYPECTOMY;  Surgeon: Kiesha Yanes DO;  Location:  MAIN OR;  Service: Gynecology   • MS LAPS FULG/EXC OVARY VISCERA/PERITONEAL SURFACE N/A 11/10/2022    Procedure: ROBOT BILATERAL OOPHORECTOMY, STAGING PERITONEAL BIOPSIES, EXCISION OF NODULAR/ABNORMAL APPEARING PERITONEAL AREAS, INFRACOLIC OMENTECTOMY;  Surgeon: Samina Laboy MD;  Location: [facility];  Service: Gynecology Oncology   • MT LAPS TOTAL HYSTERECT 250 GM/< W/RMVL TUBE/OVARY N/A 08/03/2022    Procedure: DIAGNOSTIC LAPAROSCOPY, ROBOTIC LAPAROSCOPIC HYSTERECTOMY, BILATERAL SALPINGECTOMY;  Surgeon: Jessica Pak DO;  Location:  MAIN OR;  Service: Gynecology   • SHOULDER ARTHROSCOPY Right      Family History   Problem Relation Age of Onset   • Heart disease Mother    • COPD Mother    • Stroke Father    • Leukemia Maternal Grandfather    • Ovarian cancer Cousin      Social History     Socioeconomic History   • Marital status: /Civil Union     Spouse name: Not on file   • Number of children: Not on file   • Years of education: Not on file   • Highest education level: Not on file   Occupational History   • Not on file   Tobacco Use   • Smoking status: Never   • Smokeless tobacco: Never   Vaping Use   • Vaping Use: Never used   Substance and Sexual Activity   • Alcohol use: Yes     Comment: occasionally   • Drug use: Never   • Sexual activity: Not on file     Comment: did not ask   Other Topics Concern   • Not on file   Social History Narrative   • Not on file     Social Determinants of Health     Financial Resource Strain: Not on file   Food Insecurity: Not on file   Transportation Needs: Not on file   Physical Activity: Not on file   Stress: Not on file   Social Connections: Not on file   Intimate Partner Violence: Not on file   Housing Stability: Not on file       Current Outpatient Medications:   •  amLODIPine (NORVASC) 10 mg tablet, Take 10 mg by mouth daily, Disp: , Rfl:   •  atenolol (TENORMIN) 25 mg tablet, Take 25 mg by mouth 2 (two) times a day, Disp: , Rfl:   •  atenolol (TENORMIN) 50 mg tablet, , Disp: , Rfl:   •  Calcium-Magnesium-Vitamin D (CALCIUM 1200+D3 PO), Take 1 tablet by mouth daily, Disp: , Rfl:   •  fenofibrate (TRICOR) 145 mg tablet, Take 145 mg by mouth daily, Disp: , Rfl:   •  fluticasone (FLONASE) 50 mcg/act nasal spray, 1 spray into each nostril daily, Disp: , Rfl:   •  hydrochlorothiazide (HYDRODIURIL) 25 mg tablet, Take 25 mg by mouth daily, Disp: , Rfl:   •  loratadine-pseudoephedrine (CLARITIN-D 12-HOUR) 5-120 mg per tablet, Take 1 tablet by mouth 2 (two) times a day, Disp: , Rfl:   •  losartan (COZAAR) 100 MG tablet, , Disp: , Rfl:   •  methylPREDNISolone 4 MG tablet therapy pack, follow package directions, Disp: , Rfl:   •  Multiple Vitamins-Minerals (MULTIVITAMIN WOMEN PO), Take 1 tablet by mouth daily, Disp: , Rfl:   •  potassium chloride (K-DUR,KLOR-CON) 20 mEq tablet, Take 20 mEq by mouth daily, Disp: , Rfl:   •  SUMAtriptan (IMITREX) 5 MG/ACT nasal spray, 1 spray into each nostril every 2 (two) hours as needed for migraine, Disp: , Rfl:   •  topiramate (TOPAMAX) 50 MG tablet, Take 50 mg by mouth every 12 (twelve) hours, Disp: , Rfl:   •  valACYclovir (VALTREX) 1,000 mg tablet, Take 1,000 mg by mouth as needed, Disp: , Rfl:   Allergies   Allergen Reactions   • Pneumococcal Vaccines Anaphylaxis     Pt states she hallucinated  • Latex Hives     Vitals:    02/01/23 1550   BP: 136/68   Pulse: 91   Resp: 16   Temp: 99 °F (37 2 °C)   SpO2: 98%       Physical Exam  Vitals reviewed  Constitutional:       Appearance: Normal appearance  HENT:      Head: Atraumatic  Right Ear: External ear normal       Left Ear: External ear normal    Eyes:      Extraocular Movements: Extraocular movements intact  Pupils: Pupils are equal, round, and reactive to light  Cardiovascular:      Rate and Rhythm: Normal rate and regular rhythm  Heart sounds: Normal heart sounds  Pulmonary:      Effort: Pulmonary effort is normal       Breath sounds: Normal breath sounds  Abdominal:      General: Abdomen is flat  Palpations: Abdomen is soft  Musculoskeletal:         General: Normal range of motion  Cervical back: Normal range of motion and neck supple  Skin:     General: Skin is warm and dry  Neurological:      General: No focal deficit present  Mental Status: She is alert and oriented to person, place, and time  Psychiatric:         Mood and Affect: Mood normal          Behavior: Behavior normal            Results:  Labs:  Component Ref Range & Units 1/21/23  8:39 AM    Renin 0 167 - 5 380 ng/mL/hr <0 167      Component Ref Range & Units 1/2/23  8:50 AM    Normetanephrine, Free 0 0 - 218 9 pg/mL 48 2    Metanephrine, Free 0 0 - 88 0 pg/mL 11 7      Component Ref Range & Units 1/2/23  8:50 AM    Cortisol - AM 4 2 - 22 4 ug/dL 13 0     Component Ref Range & Units 1/2/23  8:50 AM    Norepinephrine Plasma 0 - 874 pg/mL 367    Epinephrine Plasma 0 - 62 pg/mL 20    Dopamine Plasma 0 - 48 pg/mL <30        Component Ref Range & Units 1/2/23  8:50 AM    Aldosterone 0 0 - 30 0 ng/dL 10 2     This SmartLink has not been configured with any valid records  Lab Results   Component Value Date    SODIUM 142 01/02/2023    K 3 0 (L) 01/02/2023     01/02/2023    CO2 26 01/02/2023    BUN 19 01/02/2023    CREATININE 0 78 01/02/2023    GLUC 78 11/10/2022    CALCIUM 8 7 01/02/2023         Imaging  CT ABDOMEN AND PELVIS WITH AND WITHOUT IV CONTRAST     INDICATION:   E27 8: Other specified disorders of adrenal gland  History of hysterectomy and bilateral salpingectomy for abnormal uterine bleeding; borderline serous features of left fallopian tube; Incidental left adrenal mass     COMPARISON:  8/24/2022 CT     TECHNIQUE: Initial CT of the abdomen and pelvis was performed without intravenous contrast   Subsequent dynamic CT evaluation of the abdomen and pelvis was performed in both portal phase as well as 15 minute delayed imaging as per adrenal protocol     Axial, sagittal, and coronal 2D reformatted images were created from the source data and submitted for interpretation       Radiation dose length product (DLP) for this visit:  1524 mGy-cm   This examination, like all CT scans performed in the [facility], was performed utilizing techniques to minimize radiation dose exposure, including the use of iterative   reconstruction and automated exposure control      IV Contrast:  100 mL of iohexol (OMNIPAQUE)  Enteric Contrast:  Enteric contrast was not administered      FINDINGS:     ABDOMEN     RIGHT KIDNEY AND URETER:  No solid renal mass  No detectable urothelial mass  No hydronephrosis or hydroureter  No urinary tract calculi  No perinephric collection      LEFT KIDNEY AND URETER:  No solid renal mass  No detectable urothelial mass  No hydronephrosis or hydroureter  No urinary tract calculi  No perinephric collection      URINARY BLADDER:  No bladder wall mass  No calculi         LOWER CHEST:  No clinically significant abnormality identified in the visualized lower chest   Small groundglass nodule is not included on the field-of-view on the current examination      LIVER/BILIARY TREE:  Fatty infiltration of liver is noted      GALLBLADDER:  No calcified gallstones  No pericholecystic inflammatory change      SPLEEN:  Unremarkable      PANCREAS:  Unremarkable      ADRENAL GLANDS:  The right adrenal is unremarkable      There is a 2 2 x 1 6 cm left adrenal nodule similar to the prior study  Precontrast density minus 4HU  Portal phase 68 HU  Delayed 16 HU  The adrenal gland is homogeneous  Low Hounsfield density and absolute washout of 81% is consistent with an adenoma      STOMACH AND BOWEL:  Unremarkable      APPENDIX:  A normal appendix was visualized      ABDOMINOPELVIC CAVITY:  No ascites  No free intraperitoneal air  No lymphadenopathy      VESSELS:  Unremarkable for patient's age      PELVIS     REPRODUCTIVE ORGANS:  The patient is status post hysterectomy  There is an enhancing the right ovary most likely prior examination  The left ovary is not enlarged    A small follicle may be present      ABDOMINAL WALL/INGUINAL REGIONS:  Small periumbilical hernia of fat      OSSEOUS STRUCTURES:  No acute fracture or destructive osseous lesion  Well-circumscribed dense nodule involving the pedicle of T12 is noted on image 29, series 3 with suggestion of slight spiculation  Findings are most likely related to a bone island  This is unchanged from the prior study  A similar lesion is seen T11 to the right of midline  Sclerotic osseous metastatic disease would be unusual with endometrial carcinoma  A few punctate areas are also seen in the pelvis      IMPRESSION:     2 2 cm left adrenal nodule is consistent with an adenoma  Correlation with endocrinologic studies should be considered      No evidence of mass or adenopathy elsewhere      Status post hysterectomy with cyst in the right ovary      Small groundglass right lung nodule was not included on the field-of-view on the current examination      A few sclerotic lesions are noted in the spine most likely related to bone islands  Continued surveillance is advised  If there is high clinical concern, bone scan could be performed      Workstation performed: [id number]     I reviewed the above laboratory and imaging data  Discussion/Summary: 49-year-old woman, suspected hyperaldosteronism with left-sided adrenal nodule  Plan middle invasive possible open left adrenalectomy  All questions answered and consent signed at this visit

## 2023-02-01 NOTE — PROGRESS NOTES
Surgical Oncology Follow Up       [facility] SURGICAL ONCOLOGY [place]  [facility] [address]    Lianna Diazccasin  1975  [id number]  [facility] SURGICAL ONCOLOGY [place]  [place] [address]    Chief Complaint   Patient presents with   • Follow-up       Assessment/Plan:    No problem-specific Assessment & Plan notes found for this encounter  Diagnoses and all orders for this visit:    Adrenal mass ([address] )    Other orders  -     loratadine-pseudoephedrine (CLARITIN-D 12-HOUR) 5-120 mg per tablet; Take 1 tablet by mouth 2 (two) times a day  -     methylPREDNISolone 4 MG tablet therapy pack; follow package directions  -     atenolol (TENORMIN) 50 mg tablet        Advance Care Planning/Advance Directives:  Discussed disease status, cancer treatment plans and/or cancer treatment goals with the patient  Oncology History   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   11/1/2022 Initial Diagnosis    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     12/6/2022 -  Cancer Staged    Staging form: Ovary, Fallopian Tube, Primary Peritoneal, AJCC 8th Edition  - Clinical: FIGO Stage I, calculated as Stage IA (cT1a, cN0, cM0) - Signed by Yue Taylor MD on 12/6/2022  Histologic grade (G): GB  Histologic grading system: 4 grade system           History of Present Illness: 80-year-old woman with incidental left-sided adrenal nodule, possibly from a reactive preop visit   -Interval History: No complaints since her last visit  Review of Systems:  Review of Systems   Constitutional: Negative  HENT: Negative  Eyes: Negative  Respiratory: Negative  Cardiovascular: Negative  Gastrointestinal: Negative  Endocrine: Negative  Genitourinary: Negative  Musculoskeletal: Negative  Skin: Negative  Allergic/Immunologic: Negative      Neurological: Negative  Hematological: Negative  Psychiatric/Behavioral: Negative  Patient Active Problem List   Diagnosis   • PONV (postoperative nausea and vomiting)   • HTN (hypertension)   • Hyperlipidemia   • Prediabetes   • Headache   • Adrenal mass (HCC)   • 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   • Family history of cancer   • Hypokalemia   • S/P BSO (bilateral salpingo-oophorectomy)   • Surgical menopause     Past Medical History:   Diagnosis Date   • Abnormal uterine bleeding    • Cervical polyp    • Chronic diarrhea    • Hyperlipidemia    • Hypertension    • Migraine     Pt was started on Effexor for prevention  Pt states it has helped   • PONV (postoperative nausea and vomiting)     Pt reports having times one in    • Post-operative nausea and vomiting     vomited x3 after procedure 8/3/22   • Prediabetes    • Wears contact lenses      Past Surgical History:   Procedure Laterality Date   •  SECTION     • COLONOSCOPY     • FRACTURE SURGERY Right     Pt had a finger articular surgery for repair middle finger     • HYSTERECTOMY     • HYSTERECTOMY  2022   • HYSTEROSCOPY N/A 2021    Procedure: HYSTEROSCOPY, D&C, POLYPECTOMY;  Surgeon: Danielle Izaguirre DO;  Location:  MAIN OR;  Service: Gynecology   • WV LAPS FULG/EXC OVARY VISCERA/PERITONEAL SURFACE N/A 11/10/2022    Procedure: ROBOT BILATERAL OOPHORECTOMY, STAGING PERITONEAL BIOPSIES, EXCISION OF NODULAR/ABNORMAL APPEARING PERITONEAL AREAS, INFRACOLIC OMENTECTOMY;  Surgeon: Kaila Segal MD;  Location: [facility];  Service: Gynecology Oncology   • WV LAPS TOTAL HYSTERECT 250 GM/< W/RMVL TUBE/OVARY N/A 2022    Procedure: DIAGNOSTIC LAPAROSCOPY, ROBOTIC LAPAROSCOPIC HYSTERECTOMY, BILATERAL SALPINGECTOMY;  Surgeon: Danielle Izaguirre DO;  Location:  MAIN OR;  Service: Gynecology   • SHOULDER ARTHROSCOPY Right      Family History   Problem Relation Age of Onset   • Heart disease Mother • COPD Mother    • Stroke Father    • Leukemia Maternal Grandfather    • Ovarian cancer Cousin      Social History     Socioeconomic History   • Marital status: /Civil Union     Spouse name: Not on file   • Number of children: Not on file   • Years of education: Not on file   • Highest education level: Not on file   Occupational History   • Not on file   Tobacco Use   • Smoking status: Never   • Smokeless tobacco: Never   Vaping Use   • Vaping Use: Never used   Substance and Sexual Activity   • Alcohol use: Yes     Comment: occasionally   • Drug use: Never   • Sexual activity: Not on file     Comment: did not ask   Other Topics Concern   • Not on file   Social History Narrative   • Not on file     Social Determinants of Health     Financial Resource Strain: Not on file   Food Insecurity: Not on file   Transportation Needs: Not on file   Physical Activity: Not on file   Stress: Not on file   Social Connections: Not on file   Intimate Partner Violence: Not on file   Housing Stability: Not on file       Current Outpatient Medications:   •  amLODIPine (NORVASC) 10 mg tablet, Take 10 mg by mouth daily, Disp: , Rfl:   •  atenolol (TENORMIN) 25 mg tablet, Take 25 mg by mouth 2 (two) times a day, Disp: , Rfl:   •  atenolol (TENORMIN) 50 mg tablet, , Disp: , Rfl:   •  Calcium-Magnesium-Vitamin D (CALCIUM 1200+D3 PO), Take 1 tablet by mouth daily, Disp: , Rfl:   •  fenofibrate (TRICOR) 145 mg tablet, Take 145 mg by mouth daily, Disp: , Rfl:   •  fluticasone (FLONASE) 50 mcg/act nasal spray, 1 spray into each nostril daily, Disp: , Rfl:   •  hydrochlorothiazide (HYDRODIURIL) 25 mg tablet, Take 25 mg by mouth daily, Disp: , Rfl:   •  loratadine-pseudoephedrine (CLARITIN-D 12-HOUR) 5-120 mg per tablet, Take 1 tablet by mouth 2 (two) times a day, Disp: , Rfl:   •  losartan (COZAAR) 100 MG tablet, , Disp: , Rfl:   •  methylPREDNISolone 4 MG tablet therapy pack, follow package directions, Disp: , Rfl:   •  Multiple Vitamins-Minerals (MULTIVITAMIN WOMEN PO), Take 1 tablet by mouth daily, Disp: , Rfl:   •  potassium chloride (K-DUR,KLOR-CON) 20 mEq tablet, Take 20 mEq by mouth daily, Disp: , Rfl:   •  SUMAtriptan (IMITREX) 5 MG/ACT nasal spray, 1 spray into each nostril every 2 (two) hours as needed for migraine, Disp: , Rfl:   •  topiramate (TOPAMAX) 50 MG tablet, Take 50 mg by mouth every 12 (twelve) hours, Disp: , Rfl:   •  valACYclovir (VALTREX) 1,000 mg tablet, Take 1,000 mg by mouth as needed, Disp: , Rfl:   Allergies   Allergen Reactions   • Pneumococcal Vaccines Anaphylaxis     Pt states she hallucinated  • Latex Hives     Vitals:    02/01/23 1550   BP: 136/68   Pulse: 91   Resp: 16   Temp: 99 °F (37 2 °C)   SpO2: 98%       Physical Exam  Vitals reviewed  Constitutional:       Appearance: Normal appearance  HENT:      Head: Atraumatic  Right Ear: External ear normal       Left Ear: External ear normal    Eyes:      Extraocular Movements: Extraocular movements intact  Pupils: Pupils are equal, round, and reactive to light  Cardiovascular:      Rate and Rhythm: Normal rate and regular rhythm  Heart sounds: Normal heart sounds  Pulmonary:      Effort: Pulmonary effort is normal       Breath sounds: Normal breath sounds  Abdominal:      General: Abdomen is flat  Palpations: Abdomen is soft  Musculoskeletal:         General: Normal range of motion  Cervical back: Normal range of motion and neck supple  Skin:     General: Skin is warm and dry  Neurological:      General: No focal deficit present  Mental Status: She is alert and oriented to person, place, and time     Psychiatric:         Mood and Affect: Mood normal          Behavior: Behavior normal            Results:  Labs:  Component Ref Range & Units 1/21/23  8:39 AM    Renin 0 167 - 5 380 ng/mL/hr <0 167      Component Ref Range & Units 1/2/23  8:50 AM    Normetanephrine, Free 0 0 - 218 9 pg/mL 48 2    Metanephrine, Free 0 0 - 88 0 pg/mL 11 7      Component Ref Range & Units 1/2/23  8:50 AM    Cortisol - AM 4 2 - 22 4 ug/dL 13 0     Component Ref Range & Units 1/2/23  8:50 AM    Norepinephrine Plasma 0 - 874 pg/mL 367    Epinephrine Plasma 0 - 62 pg/mL 20    Dopamine Plasma 0 - 48 pg/mL <30        Component Ref Range & Units 1/2/23  8:50 AM    Aldosterone 0 0 - 30 0 ng/dL 10 2     This SmartLink has not been configured with any valid records  Lab Results   Component Value Date    SODIUM 142 01/02/2023    K 3 0 (L) 01/02/2023     01/02/2023    CO2 26 01/02/2023    BUN 19 01/02/2023    CREATININE 0 78 01/02/2023    GLUC 78 11/10/2022    CALCIUM 8 7 01/02/2023         Imaging  CT ABDOMEN AND PELVIS WITH AND WITHOUT IV CONTRAST     INDICATION:   E27 8: Other specified disorders of adrenal gland  History of hysterectomy and bilateral salpingectomy for abnormal uterine bleeding; borderline serous features of left fallopian tube; Incidental left adrenal mass     COMPARISON:  8/24/2022 CT     TECHNIQUE: Initial CT of the abdomen and pelvis was performed without intravenous contrast   Subsequent dynamic CT evaluation of the abdomen and pelvis was performed in both portal phase as well as 15 minute delayed imaging as per adrenal protocol  Axial, sagittal, and coronal 2D reformatted images were created from the source data and submitted for interpretation       Radiation dose length product (DLP) for this visit:  1524 mGy-cm   This examination, like all CT scans performed in the [facility], was performed utilizing techniques to minimize radiation dose exposure, including the use of iterative   reconstruction and automated exposure control      IV Contrast:  100 mL of iohexol (OMNIPAQUE)  Enteric Contrast:  Enteric contrast was not administered      FINDINGS:     ABDOMEN     RIGHT KIDNEY AND URETER:  No solid renal mass  No detectable urothelial mass  No hydronephrosis or hydroureter  No urinary tract calculi  No perinephric collection      LEFT KIDNEY AND URETER:  No solid renal mass  No detectable urothelial mass  No hydronephrosis or hydroureter  No urinary tract calculi  No perinephric collection      URINARY BLADDER:  No bladder wall mass  No calculi         LOWER CHEST:  No clinically significant abnormality identified in the visualized lower chest   Small groundglass nodule is not included on the field-of-view on the current examination      LIVER/BILIARY TREE:  Fatty infiltration of liver is noted      GALLBLADDER:  No calcified gallstones  No pericholecystic inflammatory change      SPLEEN:  Unremarkable      PANCREAS:  Unremarkable      ADRENAL GLANDS:  The right adrenal is unremarkable      There is a 2 2 x 1 6 cm left adrenal nodule similar to the prior study  Precontrast density minus 4HU  Portal phase 68 HU  Delayed 16 HU  The adrenal gland is homogeneous  Low Hounsfield density and absolute washout of 81% is consistent with an adenoma      STOMACH AND BOWEL:  Unremarkable      APPENDIX:  A normal appendix was visualized      ABDOMINOPELVIC CAVITY:  No ascites  No free intraperitoneal air  No lymphadenopathy      VESSELS:  Unremarkable for patient's age      PELVIS     REPRODUCTIVE ORGANS:  The patient is status post hysterectomy  There is an enhancing the right ovary most likely prior examination  The left ovary is not enlarged  A small follicle may be present      ABDOMINAL WALL/INGUINAL REGIONS:  Small periumbilical hernia of fat      OSSEOUS STRUCTURES:  No acute fracture or destructive osseous lesion  Well-circumscribed dense nodule involving the pedicle of T12 is noted on image 29, series 3 with suggestion of slight spiculation  Findings are most likely related to a bone island  This is unchanged from the prior study  A similar lesion is seen T11 to the right of midline  Sclerotic osseous metastatic disease would be unusual with endometrial carcinoma    A few punctate areas are also seen in the pelvis      IMPRESSION:     2 2 cm left adrenal nodule is consistent with an adenoma  Correlation with endocrinologic studies should be considered      No evidence of mass or adenopathy elsewhere      Status post hysterectomy with cyst in the right ovary      Small groundglass right lung nodule was not included on the field-of-view on the current examination      A few sclerotic lesions are noted in the spine most likely related to bone islands  Continued surveillance is advised  If there is high clinical concern, bone scan could be performed      Workstation performed: [id number]     I reviewed the above laboratory and imaging data  Discussion/Summary: 41-year-old woman, suspected hyperaldosteronism with left-sided adrenal nodule  Plan middle invasive possible open left adrenalectomy  All questions answered and consent signed at this visit

## 2023-02-02 ENCOUNTER — TELEPHONE (OUTPATIENT)
Dept: SURGICAL ONCOLOGY | Facility: CLINIC | Age: 48
End: 2023-02-02

## 2023-02-02 LAB
ATRIAL RATE: 66 BPM
ATRIAL RATE: 69 BPM
P AXIS: 54 DEGREES
P AXIS: 74 DEGREES
PR INTERVAL: 150 MS
PR INTERVAL: 160 MS
QRS AXIS: 26 DEGREES
QRS AXIS: 36 DEGREES
QRSD INTERVAL: 88 MS
QRSD INTERVAL: 90 MS
QT INTERVAL: 420 MS
QT INTERVAL: 424 MS
QTC INTERVAL: 444 MS
QTC INTERVAL: 450 MS
T WAVE AXIS: 23 DEGREES
T WAVE AXIS: 37 DEGREES
VENTRICULAR RATE: 66 BPM
VENTRICULAR RATE: 69 BPM

## 2023-02-02 NOTE — TELEPHONE ENCOUNTER
Spoke with Dennise Harris at [facility] office to notify of need for medical clearance prior to Robotic adrenalectomy on 2/9 with Dr Lisesth Cerrato  Faxed form and PATs to [phone] as requested

## 2023-02-08 ENCOUNTER — TELEPHONE (OUTPATIENT)
Dept: SURGICAL ONCOLOGY | Facility: CLINIC | Age: 48
End: 2023-02-08

## 2023-02-08 NOTE — TELEPHONE ENCOUNTER
Spoke with Cheri Cornejo at Dr Keyur Buck office  Provided both [facility] Her[facility] and [facility] office fax #'s to send me the medical clearance form for Ayala's surgery tomorrow 2/9  Gave main office # and my Teams # if they have any questions  Cheri Cornejo sent a message to the office, to get it over to me as soon as they can

## 2023-02-09 ENCOUNTER — HOSPITAL ENCOUNTER (INPATIENT)
Facility: HOSPITAL | Age: 48
LOS: 1 days | Discharge: HOME | End: 2023-02-10
Attending: SURGERY | Admitting: SURGERY

## 2023-02-09 ENCOUNTER — ANESTHESIA (OUTPATIENT)
Dept: PERIOP | Facility: HOSPITAL | Age: 48
End: 2023-02-09

## 2023-02-09 ENCOUNTER — ANESTHESIA EVENT (OUTPATIENT)
Dept: PERIOP | Facility: HOSPITAL | Age: 48
End: 2023-02-09

## 2023-02-09 DIAGNOSIS — E27.8 ADRENAL MASS (HCC): ICD-10-CM

## 2023-02-09 PROCEDURE — 0GT20ZZ RESECTION OF LEFT ADRENAL GLAND, OPEN APPROACH: ICD-10-PCS | Performed by: SURGERY

## 2023-02-09 PROCEDURE — 8E094CZ ROBOTIC ASSISTED PROCEDURE OF HEAD AND NECK REGION, PERCUTANEOUS ENDOSCOPIC APPROACH: ICD-10-PCS | Performed by: SURGERY

## 2023-02-09 RX ORDER — CEFAZOLIN SODIUM 1 G/50ML
1000 SOLUTION INTRAVENOUS ONCE
Status: COMPLETED | OUTPATIENT
Start: 2023-02-09 | End: 2023-02-09

## 2023-02-09 RX ORDER — SODIUM CHLORIDE, SODIUM LACTATE, POTASSIUM CHLORIDE, CALCIUM CHLORIDE 600; 310; 30; 20 MG/100ML; MG/100ML; MG/100ML; MG/100ML
INJECTION, SOLUTION INTRAVENOUS CONTINUOUS PRN
Status: DISCONTINUED | OUTPATIENT
Start: 2023-02-09 | End: 2023-02-09

## 2023-02-09 RX ORDER — ACETAMINOPHEN 325 MG/1
650 TABLET ORAL EVERY 6 HOURS PRN
Status: DISCONTINUED | OUTPATIENT
Start: 2023-02-09 | End: 2023-02-10 | Stop reason: HOSPADM

## 2023-02-09 RX ORDER — METOCLOPRAMIDE HYDROCHLORIDE 5 MG/ML
10 INJECTION INTRAMUSCULAR; INTRAVENOUS ONCE AS NEEDED
Status: COMPLETED | OUTPATIENT
Start: 2023-02-09 | End: 2023-02-09

## 2023-02-09 RX ORDER — ALBUTEROL SULFATE 2.5 MG/3ML
2.5 SOLUTION RESPIRATORY (INHALATION) ONCE AS NEEDED
Status: DISCONTINUED | OUTPATIENT
Start: 2023-02-09 | End: 2023-02-09 | Stop reason: HOSPADM

## 2023-02-09 RX ORDER — SCOLOPAMINE TRANSDERMAL SYSTEM 1 MG/1
1 PATCH, EXTENDED RELEASE TRANSDERMAL
Status: DISCONTINUED | OUTPATIENT
Start: 2023-02-09 | End: 2023-02-10 | Stop reason: HOSPADM

## 2023-02-09 RX ORDER — ONDANSETRON 2 MG/ML
INJECTION INTRAMUSCULAR; INTRAVENOUS AS NEEDED
Status: DISCONTINUED | OUTPATIENT
Start: 2023-02-09 | End: 2023-02-09

## 2023-02-09 RX ORDER — TOPIRAMATE 25 MG/1
50 TABLET ORAL EVERY 12 HOURS SCHEDULED
Status: DISCONTINUED | OUTPATIENT
Start: 2023-02-09 | End: 2023-02-10 | Stop reason: HOSPADM

## 2023-02-09 RX ORDER — OXYCODONE HYDROCHLORIDE 10 MG/1
10 TABLET ORAL EVERY 4 HOURS PRN
Status: DISCONTINUED | OUTPATIENT
Start: 2023-02-09 | End: 2023-02-10 | Stop reason: HOSPADM

## 2023-02-09 RX ORDER — ONDANSETRON 2 MG/ML
4 INJECTION INTRAMUSCULAR; INTRAVENOUS ONCE AS NEEDED
Status: COMPLETED | OUTPATIENT
Start: 2023-02-09 | End: 2023-02-09

## 2023-02-09 RX ORDER — ROCURONIUM BROMIDE 10 MG/ML
INJECTION, SOLUTION INTRAVENOUS AS NEEDED
Status: DISCONTINUED | OUTPATIENT
Start: 2023-02-09 | End: 2023-02-09

## 2023-02-09 RX ORDER — FENTANYL CITRATE 50 UG/ML
INJECTION, SOLUTION INTRAMUSCULAR; INTRAVENOUS AS NEEDED
Status: DISCONTINUED | OUTPATIENT
Start: 2023-02-09 | End: 2023-02-09

## 2023-02-09 RX ORDER — MIDAZOLAM HYDROCHLORIDE 2 MG/2ML
INJECTION, SOLUTION INTRAMUSCULAR; INTRAVENOUS AS NEEDED
Status: DISCONTINUED | OUTPATIENT
Start: 2023-02-09 | End: 2023-02-09

## 2023-02-09 RX ORDER — PROPOFOL 10 MG/ML
INJECTION, EMULSION INTRAVENOUS AS NEEDED
Status: DISCONTINUED | OUTPATIENT
Start: 2023-02-09 | End: 2023-02-09

## 2023-02-09 RX ORDER — BUPIVACAINE HYDROCHLORIDE 2.5 MG/ML
INJECTION, SOLUTION EPIDURAL; INFILTRATION; INTRACAUDAL AS NEEDED
Status: DISCONTINUED | OUTPATIENT
Start: 2023-02-09 | End: 2023-02-09 | Stop reason: HOSPADM

## 2023-02-09 RX ORDER — HYDROMORPHONE HCL/PF 1 MG/ML
0.5 SYRINGE (ML) INJECTION
Status: DISCONTINUED | OUTPATIENT
Start: 2023-02-09 | End: 2023-02-09 | Stop reason: HOSPADM

## 2023-02-09 RX ORDER — AMLODIPINE BESYLATE 10 MG/1
10 TABLET ORAL DAILY
Status: DISCONTINUED | OUTPATIENT
Start: 2023-02-10 | End: 2023-02-10 | Stop reason: HOSPADM

## 2023-02-09 RX ORDER — ENOXAPARIN SODIUM 100 MG/ML
40 INJECTION SUBCUTANEOUS DAILY
Status: DISCONTINUED | OUTPATIENT
Start: 2023-02-10 | End: 2023-02-10 | Stop reason: HOSPADM

## 2023-02-09 RX ORDER — HEPARIN SODIUM 5000 [USP'U]/ML
5000 INJECTION, SOLUTION INTRAVENOUS; SUBCUTANEOUS EVERY 8 HOURS SCHEDULED
Status: COMPLETED | OUTPATIENT
Start: 2023-02-09 | End: 2023-02-09

## 2023-02-09 RX ORDER — PROMETHAZINE HYDROCHLORIDE 25 MG/ML
12.5 INJECTION, SOLUTION INTRAMUSCULAR; INTRAVENOUS ONCE AS NEEDED
Status: DISCONTINUED | OUTPATIENT
Start: 2023-02-09 | End: 2023-02-09 | Stop reason: HOSPADM

## 2023-02-09 RX ORDER — LABETALOL HYDROCHLORIDE 5 MG/ML
10 INJECTION, SOLUTION INTRAVENOUS
Status: DISCONTINUED | OUTPATIENT
Start: 2023-02-09 | End: 2023-02-09 | Stop reason: HOSPADM

## 2023-02-09 RX ORDER — MAGNESIUM HYDROXIDE 1200 MG/15ML
LIQUID ORAL AS NEEDED
Status: DISCONTINUED | OUTPATIENT
Start: 2023-02-09 | End: 2023-02-09 | Stop reason: HOSPADM

## 2023-02-09 RX ORDER — PROPOFOL 10 MG/ML
INJECTION, EMULSION INTRAVENOUS CONTINUOUS PRN
Status: DISCONTINUED | OUTPATIENT
Start: 2023-02-09 | End: 2023-02-09

## 2023-02-09 RX ORDER — SODIUM CHLORIDE 9 MG/ML
INJECTION, SOLUTION INTRAVENOUS CONTINUOUS PRN
Status: DISCONTINUED | OUTPATIENT
Start: 2023-02-09 | End: 2023-02-09

## 2023-02-09 RX ORDER — HYDROMORPHONE HCL IN WATER/PF 6 MG/30 ML
0.2 PATIENT CONTROLLED ANALGESIA SYRINGE INTRAVENOUS
Status: DISCONTINUED | OUTPATIENT
Start: 2023-02-09 | End: 2023-02-09 | Stop reason: HOSPADM

## 2023-02-09 RX ORDER — ONDANSETRON 2 MG/ML
4 INJECTION INTRAMUSCULAR; INTRAVENOUS EVERY 6 HOURS PRN
Status: DISCONTINUED | OUTPATIENT
Start: 2023-02-09 | End: 2023-02-10 | Stop reason: HOSPADM

## 2023-02-09 RX ORDER — SODIUM CHLORIDE, SODIUM LACTATE, POTASSIUM CHLORIDE, CALCIUM CHLORIDE 600; 310; 30; 20 MG/100ML; MG/100ML; MG/100ML; MG/100ML
125 INJECTION, SOLUTION INTRAVENOUS CONTINUOUS
Status: DISCONTINUED | OUTPATIENT
Start: 2023-02-09 | End: 2023-02-09 | Stop reason: SDUPTHER

## 2023-02-09 RX ORDER — ATENOLOL 25 MG/1
25 TABLET ORAL 2 TIMES DAILY
Status: DISCONTINUED | OUTPATIENT
Start: 2023-02-09 | End: 2023-02-10 | Stop reason: HOSPADM

## 2023-02-09 RX ORDER — OXYCODONE HYDROCHLORIDE 5 MG/1
5 TABLET ORAL EVERY 4 HOURS PRN
Status: DISCONTINUED | OUTPATIENT
Start: 2023-02-09 | End: 2023-02-10 | Stop reason: HOSPADM

## 2023-02-09 RX ORDER — LIDOCAINE HYDROCHLORIDE 10 MG/ML
INJECTION, SOLUTION EPIDURAL; INFILTRATION; INTRACAUDAL; PERINEURAL AS NEEDED
Status: DISCONTINUED | OUTPATIENT
Start: 2023-02-09 | End: 2023-02-09

## 2023-02-09 RX ORDER — FENTANYL CITRATE/PF 50 MCG/ML
25 SYRINGE (ML) INJECTION
Status: DISCONTINUED | OUTPATIENT
Start: 2023-02-09 | End: 2023-02-09 | Stop reason: HOSPADM

## 2023-02-09 RX ORDER — DEXAMETHASONE SODIUM PHOSPHATE 10 MG/ML
INJECTION, SOLUTION INTRAMUSCULAR; INTRAVENOUS AS NEEDED
Status: DISCONTINUED | OUTPATIENT
Start: 2023-02-09 | End: 2023-02-09

## 2023-02-09 RX ORDER — SODIUM CHLORIDE, SODIUM LACTATE, POTASSIUM CHLORIDE, CALCIUM CHLORIDE 600; 310; 30; 20 MG/100ML; MG/100ML; MG/100ML; MG/100ML
100 INJECTION, SOLUTION INTRAVENOUS CONTINUOUS
Status: DISCONTINUED | OUTPATIENT
Start: 2023-02-09 | End: 2023-02-10

## 2023-02-09 RX ORDER — HYDROMORPHONE HCL/PF 1 MG/ML
0.5 SYRINGE (ML) INJECTION EVERY 4 HOURS PRN
Status: DISCONTINUED | OUTPATIENT
Start: 2023-02-09 | End: 2023-02-10 | Stop reason: HOSPADM

## 2023-02-09 RX ORDER — HEPARIN SODIUM 5000 [USP'U]/ML
5000 INJECTION, SOLUTION INTRAVENOUS; SUBCUTANEOUS EVERY 8 HOURS SCHEDULED
Status: DISCONTINUED | OUTPATIENT
Start: 2023-02-09 | End: 2023-02-09

## 2023-02-09 RX ORDER — HYDRALAZINE HYDROCHLORIDE 20 MG/ML
5 INJECTION INTRAMUSCULAR; INTRAVENOUS
Status: DISCONTINUED | OUTPATIENT
Start: 2023-02-09 | End: 2023-02-09 | Stop reason: HOSPADM

## 2023-02-09 RX ADMIN — ONDANSETRON 4 MG: 2 INJECTION INTRAMUSCULAR; INTRAVENOUS at 16:59

## 2023-02-09 RX ADMIN — FENTANYL CITRATE 100 MCG: 50 INJECTION INTRAMUSCULAR; INTRAVENOUS at 13:50

## 2023-02-09 RX ADMIN — ROCURONIUM BROMIDE 30 MG: 10 INJECTION INTRAVENOUS at 14:39

## 2023-02-09 RX ADMIN — DEXAMETHASONE SODIUM PHOSPHATE 10 MG: 10 INJECTION INTRAMUSCULAR; INTRAVENOUS at 14:50

## 2023-02-09 RX ADMIN — FENTANYL CITRATE 50 MCG: 50 INJECTION INTRAMUSCULAR; INTRAVENOUS at 15:48

## 2023-02-09 RX ADMIN — HEPARIN SODIUM 5000 UNITS: 5000 INJECTION INTRAVENOUS; SUBCUTANEOUS at 14:51

## 2023-02-09 RX ADMIN — FENTANYL CITRATE 25 MCG: 50 INJECTION INTRAMUSCULAR; INTRAVENOUS at 19:21

## 2023-02-09 RX ADMIN — SUGAMMADEX 150 MG: 100 INJECTION, SOLUTION INTRAVENOUS at 16:33

## 2023-02-09 RX ADMIN — CEFAZOLIN SODIUM 1000 MG: 1 SOLUTION INTRAVENOUS at 14:20

## 2023-02-09 RX ADMIN — ROCURONIUM BROMIDE 50 MG: 10 INJECTION INTRAVENOUS at 13:50

## 2023-02-09 RX ADMIN — SODIUM CHLORIDE, SODIUM LACTATE, POTASSIUM CHLORIDE, AND CALCIUM CHLORIDE: .6; .31; .03; .02 INJECTION, SOLUTION INTRAVENOUS at 16:04

## 2023-02-09 RX ADMIN — ONDANSETRON 4 MG: 2 INJECTION INTRAMUSCULAR; INTRAVENOUS at 16:10

## 2023-02-09 RX ADMIN — LIDOCAINE HYDROCHLORIDE 50 MG: 10 INJECTION, SOLUTION EPIDURAL; INFILTRATION; INTRACAUDAL; PERINEURAL at 13:50

## 2023-02-09 RX ADMIN — MIDAZOLAM 2 MG: 1 INJECTION INTRAMUSCULAR; INTRAVENOUS at 13:42

## 2023-02-09 RX ADMIN — SODIUM CHLORIDE, SODIUM LACTATE, POTASSIUM CHLORIDE, AND CALCIUM CHLORIDE: .6; .31; .03; .02 INJECTION, SOLUTION INTRAVENOUS at 13:44

## 2023-02-09 RX ADMIN — METOCLOPRAMIDE HYDROCHLORIDE 10 MG: 5 INJECTION INTRAMUSCULAR; INTRAVENOUS at 17:52

## 2023-02-09 RX ADMIN — FENTANYL CITRATE 25 MCG: 50 INJECTION INTRAMUSCULAR; INTRAVENOUS at 17:17

## 2023-02-09 RX ADMIN — FENTANYL CITRATE 50 MCG: 50 INJECTION INTRAMUSCULAR; INTRAVENOUS at 14:37

## 2023-02-09 RX ADMIN — PROPOFOL 160 MG: 10 INJECTION, EMULSION INTRAVENOUS at 13:50

## 2023-02-09 RX ADMIN — ROCURONIUM BROMIDE 20 MG: 10 INJECTION INTRAVENOUS at 15:50

## 2023-02-09 RX ADMIN — SCOPALAMINE 1 PATCH: 1 PATCH, EXTENDED RELEASE TRANSDERMAL at 12:30

## 2023-02-09 RX ADMIN — PROPOFOL 20 MG: 10 INJECTION, EMULSION INTRAVENOUS at 16:30

## 2023-02-09 RX ADMIN — PROPOFOL 30 MCG/KG/MIN: 10 INJECTION, EMULSION INTRAVENOUS at 13:54

## 2023-02-09 RX ADMIN — FENTANYL CITRATE 25 MCG: 50 INJECTION INTRAMUSCULAR; INTRAVENOUS at 16:59

## 2023-02-09 RX ADMIN — ATENOLOL 25 MG: 25 TABLET ORAL at 21:29

## 2023-02-09 NOTE — ANESTHESIA POSTPROCEDURE EVALUATION
Post-Op Assessment Note    CV Status:  Stable  Pain Score: 0    Pain management: adequate     Mental Status:  Alert and awake   Hydration Status:  Euvolemic and stable   PONV Controlled:  Controlled   Airway Patency:  Patent and adequate      Post Op Vitals Reviewed: Yes      Staff: CRNA         No notable events documented      /69 (02/09/23 1647)    Temp   97 5   Pulse 83 (02/09/23 1647)   Resp   19   SpO2 100 % (02/09/23 1647)
I will START or STAY ON the medications listed below when I get home from the hospital:    acetaminophen 325 mg oral tablet  -- 2 tab(s) by mouth every 4 hours, As needed, Mild Pain (1 - 3)  -- Indication: For as needed for mild-moderate pain    traMADol 50 mg oral tablet  -- 0.5 tab(s) by mouth every 6 hours, As Needed MDD:4 tablets  -- Indication: For as needed for severe pain    aspirin 81 mg oral tablet  -- 1 tab(s) by mouth once a day  -- Indication: For Heart disease    metFORMIN 1000 mg oral tablet  -- 1 tab(s) by mouth 2 times a day  -- Indication: For Diabetes    gemfibrozil 600 mg oral tablet  -- 1 tab(s) by mouth 2 times a day  -- It is very important that you take or use this exactly as directed.  Do not skip doses or discontinue unless directed by your doctor.    -- Indication: For Cholesterol    atorvastatin 20 mg oral tablet  -- 1 tab(s) by mouth once a day  -- Indication: For Cholesterol    Effient 10 mg oral tablet  -- 1 tab(s) by mouth once a day  -- Indication: For Heart disease    Toprol-XL 50 mg oral tablet, extended release  -- 1 tab(s) by mouth once a day  -- Indication: For blood pressure    amLODIPine 10 mg oral tablet  -- 1 tab(s) by mouth once a day  -- Indication: For blood pressure    docusate sodium 100 mg oral capsule  -- 1 cap(s) by mouth 3 times a day, As Needed  -- Indication: For as needed for constipation    omeprazole 20 mg oral delayed release capsule  -- 1 cap(s) by mouth once a day  -- Indication: For GERD

## 2023-02-09 NOTE — OP NOTE
OPERATIVE REPORT  PATIENT NAME: Kali Astorga    :  1975  MRN: [id number]  Pt Location: [facility] OR ROOM 14    SURGERY DATE: 2023    Surgeon(s) and Role:     * Angella Fraire MD - Primary     * Richard Vallecillo PA-C - Assisting     * [id number] Duran PA-C - Assisting     * Eulogio Cote DO - Assisting    Preop Diagnosis:  Adrenal mass ([address] ) [E27 8]    Post-Op Diagnosis Codes:     * Adrenal mass ([address] ) [E27 8]    Procedure(s):  Left - LEFT ADRENALECTOMY LAPAROSCOPIC W/ ROBOTICS    Specimen(s):  ID Type Source Tests Collected by Time Destination   1 : Left Adrenal Gland Tissue Adrenal, Left TISSUE EXAM Angella Fraire MD 2023 1502        Estimated Blood Loss:   Minimal    Drains:  NG/OG/Enteral Tube Orogastric 18 Fr Left mouth (Active)   Number of days: 0       Urethral Catheter Non-latex 16 Fr  (Active)   Number of days: 0       Anesthesia Type:   General    Operative Indications:  Adrenal mass ([address] ) [E27 8]  left    Operative Findings:  Left adrenal mass    Complications:   None    Procedure and Technique:  The patient was taken to the OR and identified by proper time-out  Following this she was   intubated by the anesthesia team  Lines and Werner catheter were placed  She was then   positioned in the right lateral decubitus position  The bed was position with the break in the   appropriate position to allow access to the left flank  The patient was then prepped draped   usual fashion       Five trocars were placed in the left subcostal area in linear fashion  One of the   trocars was a 12 mm trocar as the assistant's port, and the others were 8 mm trocars  These were placed under direct visualization to avoid injury to the underlying viscera  The abdomen was then insufflated  The splenic flexure of the colon was mobilized by taking it off its peritoneal attachments  We   then mobilized the spleen by taking it off its retroperitoneal attachments   This allowed us rotate   the spleen anteromedially  This exposed the kidney and Gerota's fascia  We then proceeded   to dissect using the Vessel Sealer to expose the kidney  Towards the   superior aspect of the kidney we noticed the enlarged adrenal gland  Surrounding attachments   were carefully mobilized and transected without manipulating or lifting on the gland  Gentle traction   was also applied to faciliate exposure and dissection  We then proceeded to dissect out the renal hilum  The   adrenal vein was visualized  We confirmed its entry into the renal vein  The adrenal vein was   then clipped and ligated  There was also adjacent artery that was clipped and ligated  At this   point, using traction-counter traction, we dissected the adrenal off the retroperitoneum using the   energy device  Medial vascular attachments of the gland were taken in hemostatic fashion with   the endergy device as were the posterior attachments of the gland  The gland was then placed in an endo bag and delivered out through the 12 mm trocar site  The field was inspected for bleeding  None was seen  Tisseel was applied to the field      Trocars were then withdrawn  We closed the 12 mm trocar site with a 2-0 Vicryl suture for the fascia  The   trocar sites were then closed with 4-0 Monocryl subcuticular skin closures  Histacryl was   applied before the patient was awakened transferred to the recovery room in stable condition       Sponge and instrument counts were correct at the end of the case     I was present for the entire procedure    Patient Disposition:  PACU         SIGNATURE: Terry Brewer MD  DATE: February 9, 2023  TIME: 4:18 PM

## 2023-02-09 NOTE — ANESTHESIA PROCEDURE NOTES
Arterial Line Insertion  Performed by: Clifford Weber CRNA  Authorized by: Lorraine Richardson MD   Consent: Verbal consent obtained  Written consent obtained  Risks and benefits: risks, benefits and alternatives were discussed  Consent given by: patient  Patient understanding: patient states understanding of the procedure being performed  Patient consent: the patient's understanding of the procedure matches consent given  Procedure consent: procedure consent matches procedure scheduled  Relevant documents: relevant documents present and verified  Patient identity confirmed: hospital-assigned identification number and arm band  Preparation: Patient was prepped and draped in the usual sterile fashion    Indications: hemodynamic monitoring  Orientation:  Right  Location: radial artery  Procedure Details:  Dilan's test normal: yes  Needle gauge: 20  Number of attempts: 1    Post-procedure:  Post-procedure: dressing applied  Waveform: good waveform and waveform confirmed  Patient tolerance: Patient tolerated the procedure well with no immediate complications

## 2023-02-09 NOTE — INTERVAL H&P NOTE
H&P reviewed  After examining the patient I find no changes in the patients condition since the H&P had been written      Vitals:    02/09/23 1157   BP: 130/84   Pulse: 66   Resp: 16   Temp: 99 4 °F (37 4 °C)   SpO2: 99%

## 2023-02-09 NOTE — ANESTHESIA PREPROCEDURE EVALUATION
Procedure:  LEFT ADRENALECTOMY LAPAROSCOPIC W/ ROBOTICS (Left: Abdomen)  POSSIBLE OPEN ADRENALECTOMY (Left: Abdomen)    Relevant Problems   ANESTHESIA   (+) PONV (postoperative nausea and vomiting)      CARDIO   (+) HTN (hypertension)   (+) Hyperlipidemia      NEURO/PSYCH   (+) Headache        Physical Exam    Airway    Mallampati score: II  TM Distance: >3 FB  Neck ROM: full     Dental   No notable dental hx     Cardiovascular  Cardiovascular exam normal    Pulmonary  Pulmonary exam normal     Other Findings        Anesthesia Plan  ASA Score- 2     Anesthesia Type- general with ASA Monitors  Additional Monitors: arterial line  Airway Plan: ETT  Comment: Medical clearance in chart  Hx N/V- Propofol inf  Plan Factors-    Chart reviewed  Imaging results reviewed  Existing labs reviewed  Patient summary reviewed  Induction- intravenous  Postoperative Plan- Plan for postoperative opioid use  Planned trial extubation    Informed Consent- Anesthetic plan and risks discussed with patient  I personally reviewed this patient with the CRNA  Discussed and agreed on the Anesthesia Plan with the CRNA  Caleb Michel

## 2023-02-10 VITALS
TEMPERATURE: 97.7 F | DIASTOLIC BLOOD PRESSURE: 68 MMHG | SYSTOLIC BLOOD PRESSURE: 122 MMHG | BODY MASS INDEX: 25.95 KG/M2 | WEIGHT: 141 LBS | RESPIRATION RATE: 18 BRPM | OXYGEN SATURATION: 96 % | HEIGHT: 62 IN | HEART RATE: 62 BPM

## 2023-02-10 LAB
ANION GAP SERPL CALCULATED.3IONS-SCNC: 8 MMOL/L (ref 4–13)
BASOPHILS # BLD AUTO: 0.01 THOUSANDS/ÂΜL (ref 0–0.1)
BASOPHILS NFR BLD AUTO: 0 % (ref 0–1)
BUN SERPL-MCNC: 16 MG/DL (ref 5–25)
CALCIUM SERPL-MCNC: 9.4 MG/DL (ref 8.3–10.1)
CHLORIDE SERPL-SCNC: 109 MMOL/L (ref 96–108)
CO2 SERPL-SCNC: 21 MMOL/L (ref 21–32)
CREAT SERPL-MCNC: 0.74 MG/DL (ref 0.6–1.3)
EOSINOPHIL # BLD AUTO: 0.03 THOUSAND/ÂΜL (ref 0–0.61)
EOSINOPHIL NFR BLD AUTO: 0 % (ref 0–6)
ERYTHROCYTE [DISTWIDTH] IN BLOOD BY AUTOMATED COUNT: 13.6 % (ref 11.6–15.1)
GFR SERPL CREATININE-BSD FRML MDRD: 96 ML/MIN/1.73SQ M
GLUCOSE SERPL-MCNC: 109 MG/DL (ref 65–140)
HCT VFR BLD AUTO: 38.9 % (ref 34.8–46.1)
HGB BLD-MCNC: 13.1 G/DL (ref 11.5–15.4)
IMM GRANULOCYTES # BLD AUTO: 0.08 THOUSAND/UL (ref 0–0.2)
IMM GRANULOCYTES NFR BLD AUTO: 1 % (ref 0–2)
LYMPHOCYTES # BLD AUTO: 1.8 THOUSANDS/ÂΜL (ref 0.6–4.47)
LYMPHOCYTES NFR BLD AUTO: 13 % (ref 14–44)
MAGNESIUM SERPL-MCNC: 2.2 MG/DL (ref 1.6–2.6)
MCH RBC QN AUTO: 29.3 PG (ref 26.8–34.3)
MCHC RBC AUTO-ENTMCNC: 33.7 G/DL (ref 31.4–37.4)
MCV RBC AUTO: 87 FL (ref 82–98)
MONOCYTES # BLD AUTO: 1.11 THOUSAND/ÂΜL (ref 0.17–1.22)
MONOCYTES NFR BLD AUTO: 8 % (ref 4–12)
NEUTROPHILS # BLD AUTO: 10.61 THOUSANDS/ÂΜL (ref 1.85–7.62)
NEUTS SEG NFR BLD AUTO: 78 % (ref 43–75)
NRBC BLD AUTO-RTO: 0 /100 WBCS
PHOSPHATE SERPL-MCNC: 3.5 MG/DL (ref 2.7–4.5)
PLATELET # BLD AUTO: 387 THOUSANDS/UL (ref 149–390)
PMV BLD AUTO: 9.5 FL (ref 8.9–12.7)
POTASSIUM SERPL-SCNC: 3.9 MMOL/L (ref 3.5–5.3)
RBC # BLD AUTO: 4.47 MILLION/UL (ref 3.81–5.12)
SODIUM SERPL-SCNC: 138 MMOL/L (ref 135–147)
WBC # BLD AUTO: 13.64 THOUSAND/UL (ref 4.31–10.16)

## 2023-02-10 RX ORDER — OXYCODONE HYDROCHLORIDE 5 MG/1
5 TABLET ORAL EVERY 4 HOURS PRN
Qty: 20 TABLET | Refills: 0 | Status: SHIPPED | OUTPATIENT
Start: 2023-02-10 | End: 2023-02-15

## 2023-02-10 RX ORDER — VALACYCLOVIR HYDROCHLORIDE 1 G/1
1000 TABLET, FILM COATED ORAL ONCE AS NEEDED
Status: DISCONTINUED | OUTPATIENT
Start: 2023-02-10 | End: 2023-02-10 | Stop reason: HOSPADM

## 2023-02-10 RX ORDER — FENOFIBRATE 145 MG/1
145 TABLET, COATED ORAL DAILY
Status: DISCONTINUED | OUTPATIENT
Start: 2023-02-10 | End: 2023-02-10 | Stop reason: HOSPADM

## 2023-02-10 RX ORDER — ACETAMINOPHEN 325 MG/1
650 TABLET ORAL EVERY 6 HOURS PRN
Refills: 0 | COMMUNITY
Start: 2023-02-10

## 2023-02-10 RX ORDER — FLUTICASONE PROPIONATE 50 MCG
1 SPRAY, SUSPENSION (ML) NASAL DAILY
Status: DISCONTINUED | OUTPATIENT
Start: 2023-02-10 | End: 2023-02-10 | Stop reason: HOSPADM

## 2023-02-10 RX ORDER — POLYETHYLENE GLYCOL 3350 17 G/17G
17 POWDER, FOR SOLUTION ORAL DAILY PRN
Status: DISCONTINUED | OUTPATIENT
Start: 2023-02-10 | End: 2023-02-10 | Stop reason: HOSPADM

## 2023-02-10 RX ORDER — DOCUSATE SODIUM 100 MG/1
100 CAPSULE, LIQUID FILLED ORAL 2 TIMES DAILY
Refills: 0 | COMMUNITY
Start: 2023-02-10

## 2023-02-10 RX ORDER — SUMATRIPTAN 25 MG/1
25 TABLET, FILM COATED ORAL ONCE AS NEEDED
Status: DISCONTINUED | OUTPATIENT
Start: 2023-02-10 | End: 2023-02-10 | Stop reason: HOSPADM

## 2023-02-10 RX ORDER — DOCUSATE SODIUM 100 MG/1
100 CAPSULE, LIQUID FILLED ORAL 2 TIMES DAILY
Status: DISCONTINUED | OUTPATIENT
Start: 2023-02-10 | End: 2023-02-10 | Stop reason: HOSPADM

## 2023-02-10 RX ADMIN — ATENOLOL 25 MG: 25 TABLET ORAL at 08:35

## 2023-02-10 RX ADMIN — OXYCODONE HYDROCHLORIDE 10 MG: 10 TABLET ORAL at 06:34

## 2023-02-10 RX ADMIN — OXYCODONE HYDROCHLORIDE 10 MG: 10 TABLET ORAL at 12:19

## 2023-02-10 RX ADMIN — INFLUENZA VIRUS VACCINE 0.5 ML: 15; 15; 15; 15 SUSPENSION INTRAMUSCULAR at 10:23

## 2023-02-10 RX ADMIN — ENOXAPARIN SODIUM 40 MG: 40 INJECTION SUBCUTANEOUS at 08:35

## 2023-02-10 RX ADMIN — TOPIRAMATE 50 MG: 25 TABLET, FILM COATED ORAL at 08:35

## 2023-02-10 RX ADMIN — FENOFIBRATE 145 MG: 145 TABLET ORAL at 09:24

## 2023-02-10 RX ADMIN — DOCUSATE SODIUM 100 MG: 100 CAPSULE, LIQUID FILLED ORAL at 09:24

## 2023-02-10 NOTE — PLAN OF CARE
Problem: PAIN - ADULT  Goal: Verbalizes/displays adequate comfort level or baseline comfort level  Description: Interventions:  - Encourage patient to monitor pain and request assistance  - Assess pain using appropriate pain scale  - Administer analgesics based on type and severity of pain and evaluate response  - Implement non-pharmacological measures as appropriate and evaluate response  - Consider cultural and social influences on pain and pain management  - Notify physician/advanced practitioner if interventions unsuccessful or patient reports new pain  Outcome: Progressing     Problem: INFECTION - ADULT  Goal: Absence or prevention of progression during hospitalization  Description: INTERVENTIONS:  - Assess and monitor for signs and symptoms of infection  - Monitor lab/diagnostic results  - Monitor all insertion sites, i e  indwelling lines, tubes, and drains  - Monitor endotracheal if appropriate and nasal secretions for changes in amount and color  - [place] appropriate cooling/warming therapies per order  - Administer medications as ordered  - Instruct and encourage patient and family to use good hand hygiene technique  - Identify and instruct in appropriate isolation precautions for identified infection/condition  Outcome: Progressing  Goal: Absence of fever/infection during neutropenic period  Description: INTERVENTIONS:  - Monitor WBC    Outcome: Progressing

## 2023-02-10 NOTE — DISCHARGE SUMMARY
Discharge Summary - Surgical Oncology   Donna Mcelroy 52 y o  female MRN: [id number]  [facility] Encounter: [id number]    Admission Date: 2/9/2023     Admitting Diagnosis: Adrenal mass ([address] ) [E27 8]    HPI: Rohan Lockhart is a 28-year-old woman who was found to have a left sided adrenal nodule found during imaging regarding her ovarian cancer  Patient states she has had some issues with her blood pressure and is now on 3 medications for hypertension  Serum potassiums have been low and she has been started on potassium supplementation  Patient is now being admitted for surgical intervention for the left adrenal nodule  Procedures Performed: 2/9/2020 robotic left adrenalectomy - Dr Yuniel Morales Course: Patient is doing extremely well postoperatively  She was out of bed and ambulating immediately postoperative  Has been no nausea or emesis  Her small robotic incisions are clean and dry  She was able to be started on clear liquids and then advance to a regular diet by the first night of surgery  Patient is ambulating the halls well  Her abdomen is soft minimal tenderness  She will be discharged home today she will be followed with Dr Preciado on 3/1/2023 at 3 PM at the [facility] office a prescription for oxycodone 5 mg was sent to [address] in [place], [place]  She is not to hesitate to call the office for any fevers of 101 5 or higher, increasing abdominal pain with vomiting    Pathology pending    Complications: None    Discharge Diagnosis: Left adrenal nodule    Discharge Date: 2/10/23    Condition at Discharge: Good    Discharge instructions/Information to patient and family:   See after visit summary for information provided to patient and family  Provisions for Follow-Up Care:  See after visit summary for information related to follow-up care and any pertinent home health orders        Disposition: Home    Planned Readmission: No    Discharge Statement   I spent 30 minutes discharging the patient  This time was spent on the day of discharge  I had direct contact with the patient on the day of discharge  Additional documentation is required if more than 30 minutes were spent on discharge  Discharge Medications:  See after visit summary for reconciled discharge medications provided to patient and family

## 2023-02-10 NOTE — QUICK NOTE
Postop note -surgical oncology  Katie Nuñez 52 y o  female MRN: [id number]  [facility] Encounter: [id number]    Assessment:  52 y o  female with left adrenal nodule is s/p Procedure(s) (LRB):  LEFT ADRENALECTOMY LAPAROSCOPIC W/ ROBOTICS (Left)  Plan:  - Diet Regular; Regular House  - Pain and Nausea control PRN  - Incentive spirometry  - OOB, ambulate  -We will obtain morning renin aldosterone level  - IV fluids    Subjective/Objective     Subjective: Patient feeling well  Left-sided abdominal pain  Tolerating diet denies any nausea or emesis  Objective:   Vitals: Blood pressure 104/64, pulse 77, temperature 97 5 °F (36 4 °C), resp  rate 20, height 5' 2" (1 575 m), weight 64 kg (141 lb), last menstrual period 07/05/2022, SpO2 94 %  ,Body mass index is 25 79 kg/m²  I/O       02/08 0701  02/09 0700 02/09 0701  02/10 0700    I V  (mL/kg)  2200 (34 4)    Total Intake(mL/kg)  2200 (34 4)    Urine (mL/kg/hr)  1200    Total Output  1200    Net  +1000                Physical Exam:  Gen: NAD, Aox3, Comfortable in bed  Chest: Normal work of breathing, no respiratory distress  Abd: Soft, be distended  Appropriately tender  Surgical sites are clean, dry, intact    Ext: No Edema  Skin: Warm, Dry, Intact          Bob Dickerson MD  2/9/2023  11:07 PM

## 2023-02-10 NOTE — PROGRESS NOTES
Progress Note - General Surgery   Sarai Solitario 52 y o  female MRN: [id number]  Unit/Bed#: [facility] 824-01 Encounter: [id number]    Assessment:  52 F p/w  L adrenal nodule, now s/p robotically assited L adrenalectomy on 2/9  VS: AVSS, room air  UOP: 1 2L     AM Labs:  Pending       Plan:  -diet as tolerated  -taper off IV fluids  -pain control   -bowel regimen  -OOB, ambulate  -DVt PPX  -disposition planning, anticipate possibly home today    Subjective/Objective       Subjective: Feeling well, moderate pain, minimally bloated, ambulating and urinating without issue     Objective:     Blood pressure 104/64, pulse 77, temperature 97 5 °F (36 4 °C), resp  rate 20, height 5' 2" (1 575 m), weight 64 kg (141 lb), last menstrual period 07/05/2022, SpO2 94 %  ,Body mass index is 25 79 kg/m²        Intake/Output Summary (Last 24 hours) at 2/10/2023 0636  Last data filed at 2/9/2023 2001  Gross per 24 hour   Intake 2200 ml   Output 1200 ml   Net 1000 ml       Invasive Devices     Peripheral Intravenous Line  Duration           Peripheral IV 02/09/23 Left Hand <1 day    Peripheral IV 02/09/23 Right Antecubital <1 day                Physical Exam:     General: NAD  HEENT: normocephalic, atraumatic   Cardiovascular: RRR  Respiratory: no distress, room air   Abdomen: soft, appropriately tender, minimally distended, incisions c/d/i, sealed with glue, mild omer-incisional ecchymoses   Extremities: No c/c/e   Neuro: AAOx3  Skin: warm, dry       Lab, Imaging and other studies:pending  VTE Pharmacologic Prophylaxis: Enoxaparin (Lovenox)  VTE Mechanical Prophylaxis: sequential compression device

## 2023-02-10 NOTE — PLAN OF CARE
Problem: PAIN - ADULT  Goal: Verbalizes/displays adequate comfort level or baseline comfort level  Description: Interventions:  - Encourage patient to monitor pain and request assistance  - Assess pain using appropriate pain scale  - Administer analgesics based on type and severity of pain and evaluate response  - Implement non-pharmacological measures as appropriate and evaluate response  - Consider cultural and social influences on pain and pain management  - Notify physician/advanced practitioner if interventions unsuccessful or patient reports new pain  Outcome: Progressing     Problem: INFECTION - ADULT  Goal: Absence or prevention of progression during hospitalization  Description: INTERVENTIONS:  - Assess and monitor for signs and symptoms of infection  - Monitor lab/diagnostic results  - Monitor all insertion sites, i e  indwelling lines, tubes, and drains  - Monitor endotracheal if appropriate and nasal secretions for changes in amount and color  - [place] appropriate cooling/warming therapies per order  - Administer medications as ordered  - Instruct and encourage patient and family to use good hand hygiene technique  - Identify and instruct in appropriate isolation precautions for identified infection/condition  Outcome: Progressing  Goal: Absence of fever/infection during neutropenic period  Description: INTERVENTIONS:  - Monitor WBC    Outcome: Progressing

## 2023-02-10 NOTE — UTILIZATION REVIEW
Initial Clinical Review    Elective Inpatient surgical procedure  Age/Sex: 52 y o  female  Surgery Date: 2/9/23  Procedure: Left - LEFT ADRENALECTOMY LAPAROSCOPIC W/ ROBOTICS  Anesthesia: general  Operative Findings: Left adrenal mass    POD#1 Progress Note:  Feeling well, moderate pain, minimally bloated, ambulating and urinating without issue  Diet as tolerated, taper off IVF, continue pain control, bowel regimen, dvt ppx  Admission Orders: Date/Time/Statement:   Admission Orders (From admission, onward)     Ordered        02/09/23 1641  Inpatient Admission  Once                      Orders Placed This Encounter   Procedures   • Inpatient Admission     Standing Status:   Standing     Number of Occurrences:   1     Order Specific Question:   Level of Care     Answer:   Med Surg [16]     Order Specific Question:   Estimated length of stay     Answer:   More than 2 Midnights     Order Specific Question:   Certification     Answer:   I certify that inpatient services are medically necessary for this patient for a duration of greater than two midnights  See H&P and MD Progress Notes for additional information about the patient's course of treatment       Vital Signs: /68   Pulse 62   Temp 97 7 °F (36 5 °C)   Resp 18   Ht 5' 2" (1 575 m)   Wt 64 kg (141 lb)   LMP 07/05/2022 (Exact Date)   SpO2 96%   BMI 25 79 kg/m²     Pertinent Labs/Diagnostic Test Results:   No orders to display         Results from last 7 days   Lab Units 02/10/23  0829   WBC Thousand/uL 13 64*   HEMOGLOBIN g/dL 13 1   HEMATOCRIT % 38 9   PLATELETS Thousands/uL 387   NEUTROS ABS Thousands/µL 10 61*         Results from last 7 days   Lab Units 02/10/23  0654   SODIUM mmol/L 138   POTASSIUM mmol/L 3 9   CHLORIDE mmol/L 109*   CO2 mmol/L 21   ANION GAP mmol/L 8   BUN mg/dL 16   CREATININE mg/dL 0 74   EGFR ml/min/1 73sq m 96   CALCIUM mg/dL 9 4   MAGNESIUM mg/dL 2 2   PHOSPHORUS mg/dL 3 5     Results from last 7 days   Lab Units 02/10/23  0654   GLUCOSE RANDOM mg/dL 109       Diet: regular  Mobility: OOB and ambulatory  DVT Prophylaxis: lovenox, scd, ambulation    Medications/Pain Control:   Scheduled Medications:  amLODIPine, 10 mg, Oral, Daily  atenolol, 25 mg, Oral, BID  docusate sodium, 100 mg, Oral, BID  enoxaparin, 40 mg, Subcutaneous, Daily  fenofibrate, 145 mg, Oral, Daily  fluticasone, 1 spray, Nasal, Daily  scopolamine, 1 patch, Transdermal, Q72H  topiramate, 50 mg, Oral, Q12H FREDRICK      Continuous IV Infusions: none     PRN Meds:  acetaminophen, 650 mg, Oral, Q6H PRN  HYDROmorphone, 0 5 mg, Intravenous, Q4H PRN  lactated ringers, 1,000 mL, Intravenous, Once PRN   And  lactated ringers, 1,000 mL, Intravenous, Once PRN  ondansetron, 4 mg, Intravenous, Q6H PRN  oxyCODONE, 10 mg, Oral, Q4H PRN  oxyCODONE, 5 mg, Oral, Q4H PRN  polyethylene glycol, 17 g, Oral, Daily PRN  sodium chloride, 1,000 mL, Intravenous, Once PRN   And  sodium chloride, 1,000 mL, Intravenous, Once PRN  SUMAtriptan, 25 mg, Oral, Once PRN  valACYclovir, 1,000 mg, Oral, Once PRN        Network Utilization Review Department  ATTENTION: Please call with any questions or concerns to [phone] and carefully listen to the prompts so that you are directed to the right person  All voicemails are confidential   Terry Kinsey all requests for admission clinical reviews, approved or denied determinations and any other requests to dedicated fax number below belonging to the campus where the patient is receiving treatment   List of dedicated fax numbers for the Facilities:  [address] [place] [address] DENIALS (Administrative/Medical Necessity) [phone]   [address] (Maternity/NICU/Pediatrics) [phone]   5 Monica Jenkins [address] [place] [address] [address] [place] [address] [address] Duran [facility] [address] [facility] 134 914 [facility] [phone]

## 2023-02-13 NOTE — UTILIZATION REVIEW
NOTIFICATION OF ADMISSION DISCHARGE   This is a Notification of Discharge from [address]  Please be advised that this patient has been discharge from our facility  Below you will find the admission and discharge date and time including the patient’s disposition  UTILIZATION REVIEW CONTACT:  Crystal Hawkins  Utilization   Network Utilization Review Department  Phone: [phone] x carefully listen to the prompts  All voicemails are confidential   Email: [email or web address]     ADMISSION INFORMATION  PRESENTATION DATE: 2/9/2023 10:44 AM  OBERVATION ADMISSION DATE:   INPATIENT ADMISSION DATE: 2/9/23  4:41 PM   DISCHARGE DATE: 2/10/2023  5:04 PM   DISPOSITION:Home/Self Care    IMPORTANT INFORMATION:  Send all requests for admission clinical reviews, approved or denied determinations and any other requests to dedicated fax number below belonging to the campus where the patient is receiving treatment   List of dedicated fax numbers:  [address] DENIALS (Administrative/Medical Necessity) [phone]   [address] (Maternity/NICU/Pediatrics) [phone]   [facility] [phone]   [place] [address] [phone]   [address] [phone]   220 [facility] [phone]   90 [facility] [phone]   [address] [place], [place] [address] [address] [phone]   [facility]  [phone]   40[address] [facility] [phone]   412 [facility] [address] [facility] [phone]

## 2023-02-21 LAB
ALDOST SERPL-MCNC: <1 NG/DL (ref 0–30)
ALDOST/RENIN PLAS-RTO: <4.2 {RATIO} (ref 0–30)
RENIN PLAS-CCNC: 0.24 NG/ML/HR (ref 0.17–5.38)

## 2023-02-28 ENCOUNTER — DOCUMENTATION (OUTPATIENT)
Dept: GENETICS | Facility: CLINIC | Age: 48
End: 2023-02-28

## 2023-02-28 ENCOUNTER — CLINICAL SUPPORT (OUTPATIENT)
Dept: GENETICS | Facility: CLINIC | Age: 48
End: 2023-02-28

## 2023-02-28 DIAGNOSIS — Z80.3 FAMILY HISTORY OF BREAST CANCER: ICD-10-CM

## 2023-02-28 DIAGNOSIS — D39.10 SEROUS SURFACE PAPILLARY TUMOR WITH BORDERLINE MALIGNANT FEATURES: Primary | ICD-10-CM

## 2023-02-28 DIAGNOSIS — D35.02 ADRENAL ADENOMA, LEFT: ICD-10-CM

## 2023-02-28 DIAGNOSIS — Z80.41 FAMILY HISTORY OF OVARIAN CANCER: ICD-10-CM

## 2023-02-28 PROBLEM — E89.6 HISTORY OF TOTAL ADRENALECTOMY (HCC): Status: ACTIVE | Noted: 2023-02-28

## 2023-02-28 NOTE — PROGRESS NOTES
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  She also has a family history of breast and ovarian cancer  She presents today to discuss the possibility of a hereditary cancer syndrome, options for genetic testing, and implications for her and her family  Cancer History and Treatment:      Personal History: 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    Zully Sullivan underwent a robotic hysterectomy bilateral salpingectomy on August 3, 2022 for abnormal uterine bleeding  Final specimen demonstrated incidental surface papillary serous borderline tumor on fallopian tube specimens  Per operative report there was no evidence of ovarian masses  Final Diagnosis   A  Uterus (98 grams) and bilateral fallopian tubes, hysterectomy and bilateral salpingectomy:  - 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  * Pathologic stage (AJCC, 8th ed ): FIGO STAGE IC2 (pT1c2), see synoptic report  * Best representative tumor block: A22   - Extensive adenomyosis  - Disordered proliferative endometrium   - Right fallopian tube with paratubal cyst      On  CT scan was performed and demonstrated no evidence of residual or metastatic disease  An incidental 2 3 cm left adrenal nodule was identified    11/10/2022  Final Diagnosis   A   Peritoneum, pelvic peritoneum biopsy:  - Fibroadipose tissue with focal cauterized epithelium favor endosalpingiosis  See note  - No invasive carcinoma identified       B  Soft Tissue, pelvic nodularity:  - Fibroadipose tissue with focal bland appearing tubal epithelium and foreign body giant cell reaction    - Negative for malignancy       C  Cul-de-sac and vaginal peritoneal nodularity:  - Fibroadipose tissue with thin walled simple epithelial lining cyst    - Negative for malignancy       D  Bilateral ovaries:  - Ovary # 1 with cystic follicles  - Ovary # 2  with corpus luteal cyst    - Fibroadipose tissue with bland epithelial lined cyst      E  Omentum :  - Benign fibroadipose tissue  2/9/2023 Left Adrenalectomy:     Final Diagnosis   A  Adrenal, Left, Left Adrenal Gland, adrenalectomy:  - Consistent with adrenal cortical adenoma (3 0 cm)  See Note  -- Special stain reticulin confirms preserved reticulin network  -- Confirmed by positive Melan A, Inhibin, Calretinin (weak), Synaptophysin (focal);         negative PAX8, Glypican 3, Arginase, SOX10, pCEA, Pankeratin (CKAE1/3),         Chromogranin A; low proliferative index (< 1%) on Ki-67 immunostains  Electronically signed by Maryann Ray MD on 2/14/2023 at  4:33 PM   Note    The presence of >/= 3 criteria for malignancy in adrenal cortical tumors (high nuclear grade, mitotic rate > 5 mitoses/50 HPF, atypical mitotic figures, < 25% clear cells, diffuse architecture, tumor necrosis, venous invasion, sinusoidal invasion, capsular invasion) correlates with malignancy  While the capsule is focally disrupted hindering evaluation for capsular invasion, no unequivocal capsular invasion and no other criteria for malignancy are identified in this specimen        Screening Hx:     Breast:  Breast Imaging: Annual Mammogram   Breast Biopsy: None     Colon:  Colonoscopy: Most recent approximately 4 years ago; reports no history of polyps    Gynecologic:  Robotic hysterectomy bilateral salpingectomy on 8/3/22 and then BSO on 11/10/2022    Skin:  No current screening    Reproductive History  Age at menarche: 6  Age at first live birth: 21  Menopause: Post Menopausal (52)  Hormone replacement: None     Medical and Surgical History  Pertinent surgical history:   Past Surgical History:   Procedure Laterality Date   •  SECTION     • COLONOSCOPY     • FRACTURE SURGERY Right     Pt had a finger articular surgery for repair middle finger  • HYSTERECTOMY     • HYSTERECTOMY  2022   • HYSTEROSCOPY N/A 2021    Procedure: HYSTEROSCOPY, D&C, POLYPECTOMY;  Surgeon: Lexii Lackey DO;  Location: [facility];  Service: Gynecology   • AZ LAPAROSCOPY ADRENALECTOMY PRTL/COMPL TABDL Left 2023    Procedure: LEFT ADRENALECTOMY LAPAROSCOPIC W/ ROBOTICS;  Surgeon: Carolyne Gastelum MD;  Location: [facility];  Service: Surgical Oncology   • AZ LAPS FULG/EXC OVARY VISCERA/PERITONEAL SURFACE N/A 11/10/2022    Procedure: ROBOT BILATERAL OOPHORECTOMY, STAGING PERITONEAL BIOPSIES, EXCISION OF NODULAR/ABNORMAL APPEARING PERITONEAL AREAS, INFRACOLIC OMENTECTOMY;  Surgeon: Simi Aldana MD;  Location: [facility];  Service: Gynecology Oncology   • AZ LAPS TOTAL HYSTERECT 250 GM/< W/RMVL TUBE/OVARY N/A 2022    Procedure: DIAGNOSTIC LAPAROSCOPY, ROBOTIC LAPAROSCOPIC HYSTERECTOMY, BILATERAL SALPINGECTOMY;  Surgeon: Lexii Lackey DO;  Location: [facility];  Service: Gynecology   • SHOULDER ARTHROSCOPY Right       Pertinent medical history:  Past Medical History:   Diagnosis Date   • Abnormal uterine bleeding    • Cervical polyp    • Chronic diarrhea    • Hyperlipidemia    • Hypertension    • Migraine     Pt was started on Effexor for prevention   Pt states it has helped   • PONV (postoperative nausea and vomiting)     Pt reports having times one in    • Post-operative nausea and vomiting     vomited x3 after procedure 8/3/22   • Prediabetes    • Wears contact lenses        Other History:  Height:   Ht Readings from Last 1 Encounters:   02/09/23 5' 2" (1 575 m)     Weight:   Wt Readings from Last 1 Encounters:   02/09/23 64 kg (141 lb)     Relevant Family History   Patient reports no Ashkenazi Oriental orthodox ancestry  Please refer to the scanned pedigree in the Media Tab for a complete family history     *All history is reported as provided by the patient; records are not available for review, except where indicated  Assessment:  We discussed sporadic, familial and hereditary cancer  We also discussed the many factors that influence our risk for cancer such as age, environmental exposures, lifestyle choices and family history  We reviewed the indications suggestive of a hereditary predisposition to cancer  We discussed that borderline tumors of the ovaries/fallopian tubes are not known to be strongly associated with a hereditary cancer syndromes  Likewise, adrenal adenomas are not known to have a strong genetic link either  That being said, Florencia Dos Santos does have a family history of an early onset breast and ovarian cancer in third-degree relatives  Prior versions of the NCCN criteria defined close blood relatives as first-, second- and third-degree relatives however recent versions of NCCN only go out to first- and second-degree relatives  Despite the fact, the Ayala's borderline tumor pathology does not have a strong link to hereditary cancer genes, she does have 2 maternal third-degree relatives with early onset bilateral breast and ovarian cancer therefore it is reasonable for her to consider genetic testing to rule out any known genetic predisposition especially for moderately penetrant genes which have clear management recommendations  The risks, benefits, and limitations of genetic testing were reviewed with the patient, as well as genetic discrimination laws, and possible test results (positive, negative, variants of uncertain significance) and their clinical implications   If positive for a mutation, options for managing cancer risk including increased surveillance, chemoprevention, and in some cases prophylactic surgery were discussed  Cristiano Jocelyneon was informed that if a hereditary cancer syndrome was identified in her, first degree relatives (parents, siblings, and children) have a chance of also inheriting the condition  Genetic testing would allow for predictive genetic testing in other relatives, who may also be at risk depending on their degree of relation  Billing:  Most individuals pay <$100 for hereditary cancer genetic testing  If insurance covers the cost of the testing, individuals may still pay out of pocket secondary to co-pays, co-insurance, or deductibles  If the cost of the testing exceeds $100, the lab will reach out to the patient via phone or e-mail  The patient will then have the option to proceed with the testing, cancel the testing, or elect the self-pay option of $250  Cristiano Brian verbalized understanding  Plan: Patient decided to proceed with testing and provided consent  Summary:     Sample Collection:  A blood kit was mailed home to the patient    Genetic Testing Preformed: Custom[employer]: Cancer® +RNAinsight® (60 genes): APC, LYLA, AXIN2, BAP1, BARD1, BMPR1A, BRCA1, BRCA2, BRIP1, CDH1, CDK4, CDKN1B, CDKN2A, CHEK2, CTNNA1, DICER1, EGLN1, EPCAM, FH, FLCN, GREM1, HOXB13, KIF1B, MAX, MEN1, MET, MITF, MLH1, MSH2, MSH3, MSH6, MUTYH, NBN, NF1, NTHL1, PALB2, PMS2, POLD1, POLE, POT1, HVKJI4A, PTEN, RAD51C, RAD51D, RB1, RECQL, RET, SDHA, SDHAF2, SDHB, SDHC, SDHD, SMAD4, SMARCA4, STK11, IHOZ598, TP53, TSC1, TSC2, VHL     Result Call Information:  I confirmed the patient's mobile number on file as the best number to call with results  [phone]  I confirmed with the patient that we can leave a voicemail on the provided numbers    Results take approximately 2-3 weeks to complete once test is started  We will contact Cristiano Brian once results are available        Additional recommendations for surveillance/medical management will be made pending genetic test results

## 2023-03-01 ENCOUNTER — OFFICE VISIT (OUTPATIENT)
Dept: SURGICAL ONCOLOGY | Facility: CLINIC | Age: 48
End: 2023-03-01

## 2023-03-01 VITALS
RESPIRATION RATE: 18 BRPM | WEIGHT: 144 LBS | BODY MASS INDEX: 26.5 KG/M2 | SYSTOLIC BLOOD PRESSURE: 120 MMHG | OXYGEN SATURATION: 99 % | DIASTOLIC BLOOD PRESSURE: 82 MMHG | TEMPERATURE: 98.1 F | HEIGHT: 62 IN | HEART RATE: 82 BPM

## 2023-03-01 DIAGNOSIS — E89.6 HISTORY OF TOTAL ADRENALECTOMY (HCC): Primary | ICD-10-CM

## 2023-03-01 NOTE — PROGRESS NOTES
Surgical Oncology Follow Up       [facility] SURGICAL ONCOLOGY JOSEFINA  [facility] [address]    Rad Victor  1975  [id number]  [address] [facility] SUR[facility] [place]  [place] [address]    No chief complaint on file  Assessment/Plan:    No problem-specific Assessment & Plan notes found for this encounter  Diagnoses and all orders for this visit:    History of total adrenalectomy [facility])  -     Basic metabolic panel; Future  -     Aldosterone/Renin Ratio; Future  -     CT abdomen pelvis w contrast; Future        Advance Care Planning/Advance Directives:  Discussed and Did not discuss disease status, cancer treatment plans and/or cancer treatment goals with the patient  Oncology History   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   11/1/2022 Initial Diagnosis    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     12/6/2022 -  Cancer Staged    Staging form: Ovary, Fallopian Tube, Primary Peritoneal, AJCC 8th Edition  - Clinical: FIGO Stage I, calculated as Stage IA (cT1a, cN0, cM0) - Signed by Bonnie Bell MD on 12/6/2022  Histologic grade (G): GB  Histologic grading system: 4 grade system           History of Present Illness: Patient is a 26-year-old woman here for postop check status post robotic left adrenalectomy  She is done well since surgery although she did have a rash at and around the port sites after the operation   -Interval History: Otherwise doing well  Review of Systems:  Review of Systems   Constitutional: Negative  HENT: Negative  Eyes: Negative  Respiratory: Negative  Cardiovascular: Negative  Gastrointestinal: Negative  Endocrine: Negative  Genitourinary: Negative  Musculoskeletal: Negative  Skin: Negative  Allergic/Immunologic:        Skin rash at port sites   Neurological: Negative  Hematological: Negative  Psychiatric/Behavioral: Negative  Patient Active Problem List   Diagnosis   • PONV (postoperative nausea and vomiting)   • HTN (hypertension)   • Hyperlipidemia   • Prediabetes   • Headache   • Adrenal mass (HCC)   • 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   • Family history of cancer   • Hypokalemia   • S/P BSO (bilateral salpingo-oophorectomy)   • Surgical menopause   • History of total adrenalectomy [facility])     Past Medical History:   Diagnosis Date   • Abnormal uterine bleeding    • Cervical polyp    • Chronic diarrhea    • Hyperlipidemia    • Hypertension    • Migraine     Pt was started on Effexor for prevention  Pt states it has helped   • PONV (postoperative nausea and vomiting)     Pt reports having times one in    • Post-operative nausea and vomiting     vomited x3 after procedure 8/3/22   • Prediabetes    • Wears contact lenses      Past Surgical History:   Procedure Laterality Date   •  SECTION     • COLONOSCOPY     • FRACTURE SURGERY Right     Pt had a finger articular surgery for repair middle finger     • HYSTERECTOMY     • HYSTERECTOMY  2022   • HYSTEROSCOPY N/A 2021    Procedure: HYSTEROSCOPY, D&C, POLYPECTOMY;  Surgeon: Carla Castro DO;  Location:  MAIN OR;  Service: Gynecology   • OR LAPAROSCOPY ADRENALECTOMY PRTL/COMPL TABDL Left 2023    Procedure: LEFT ADRENALECTOMY LAPAROSCOPIC W/ ROBOTICS;  Surgeon: Martin Prater MD;  Location: [facility] MAIN OR;  Service: Surgical Oncology   • OR LAPS FULG/EXC OVARY VISCERA/PERITONEAL SURFACE N/A 11/10/2022    Procedure: ROBOT BILATERAL OOPHORECTOMY, STAGING PERITONEAL BIOPSIES, EXCISION OF NODULAR/ABNORMAL APPEARING PERITONEAL AREAS, INFRACOLIC OMENTECTOMY;  Surgeon: Jacinto Butler MD;  Location: [facility] Main OR;  Service: Gynecology Oncology   • OR LAPS TOTAL HYSTERECT 250 GM/< W/RMVL TUBE/OVARY N/A 2022    Procedure: DIAGNOSTIC LAPAROSCOPY, ROBOTIC LAPAROSCOPIC HYSTERECTOMY, BILATERAL SALPINGECTOMY;  Surgeon: Cricket Service , DO;  Location: [facility] MAIN OR;  Service: Gynecology   • SHOULDER ARTHROSCOPY Right      Family History   Problem Relation Age of Onset   • Heart disease Mother    • COPD Mother    • Stroke Father    • Leukemia Maternal Grandfather    • Ovarian cancer Cousin      Social History     Socioeconomic History   • Marital status: /Civil Union     Spouse name: Not on file   • Number of children: Not on file   • Years of education: Not on file   • Highest education level: Not on file   Occupational History   • Not on file   Tobacco Use   • Smoking status: Never   • Smokeless tobacco: Never   Vaping Use   • Vaping Use: Never used   Substance and Sexual Activity   • Alcohol use: Yes     Comment: occasionally   • Drug use: Never   • Sexual activity: Not on file     Comment: did not ask   Other Topics Concern   • Not on file   Social History Narrative   • Not on file     Social Determinants of Health     Financial Resource Strain: Not on file   Food Insecurity: Not on file   Transportation Needs: Not on file   Physical Activity: Not on file   Stress: Not on file   Social Connections: Not on file   Intimate Partner Violence: Not on file   Housing Stability: Not on file       Current Outpatient Medications:   •  amLODIPine (NORVASC) 10 mg tablet, Take 10 mg by mouth daily, Disp: , Rfl:   •  atenolol (TENORMIN) 25 mg tablet, Take 25 mg by mouth 2 (two) times a day, Disp: , Rfl:   •  Calcium-Magnesium-Vitamin D (CALCIUM 1200+D3 PO), Take 1 tablet by mouth daily, Disp: , Rfl:   •  fenofibrate (TRICOR) 145 mg tablet, Take 145 mg by mouth daily, Disp: , Rfl:   •  fluticasone (FLONASE) 50 mcg/act nasal spray, 1 spray into each nostril daily, Disp: , Rfl:   •  hydrochlorothiazide (HYDRODIURIL) 25 mg tablet, Take 12 5 mg by mouth daily, Disp: , Rfl:   •  losartan (COZAAR) 100 MG tablet, , Disp: , Rfl:   •  Multiple Vitamins-Minerals (MULTIVITAMIN WOMEN PO), Take 1 tablet by mouth daily, Disp: , Rfl:   •  potassium chloride (K-DUR,KLOR-CON) 20 mEq tablet, Take 20 mEq by mouth daily, Disp: , Rfl:   •  SUMAtriptan (IMITREX) 5 MG/ACT nasal spray, 1 spray into each nostril every 2 (two) hours as needed for migraine, Disp: , Rfl:   •  topiramate (TOPAMAX) 50 MG tablet, Take 50 mg by mouth every 12 (twelve) hours, Disp: , Rfl:   •  valACYclovir (VALTREX) 1,000 mg tablet, Take 1,000 mg by mouth as needed, Disp: , Rfl:   •  acetaminophen (TYLENOL) 325 mg tablet, Take 2 tablets (650 mg total) by mouth every 6 (six) hours as needed for mild pain, Disp: , Rfl: 0  •  docusate sodium (COLACE) 100 mg capsule, Take 1 capsule (100 mg total) by mouth 2 (two) times a day, Disp: , Rfl: 0  Allergies   Allergen Reactions   • Pneumococcal Vaccines Anaphylaxis     Pt states she hallucinated  • Latex Hives     Vitals:    03/01/23 1443   BP: 120/82   Pulse: 82   Resp: 18   Temp: 98 1 °F (36 7 °C)   SpO2: 99%       Physical Exam  Vitals reviewed  Abdominal:      Comments: All incisions clean dry intact  Skin rash or remnants thereof noted around port sites  Results:  Labs:  Case Report   Surgical Pathology Report                         Case: [id number]                                    Authorizing Provider: Stefani Martínez MD        Collected:           02/09/2023 1502               Ordering Location:     [address]      Received:            02/09/2023 [facility]                                      Hospital Operating Room                                                       Pathologist:           Cleon Seip, MD                                                          Specimen:    Adrenal, Left, Left Adrenal Gland                                                          Final Diagnosis   A  Adrenal, Left, Left Adrenal Gland, adrenalectomy:  - Consistent with adrenal cortical adenoma (3 0 cm)  See Note       -- Special stain reticulin confirms preserved reticulin network  -- Confirmed by positive Melan A, Inhibin, Calretinin (weak), Synaptophysin (focal);         negative PAX8, Glypican 3, Arginase, SOX10, pCEA, Pankeratin (CKAE1/3),         Chromogranin A; low proliferative index (< 1%) on Ki-67 immunostains  Electronically signed by Denilson Kahn MD on 2/14/2023 at  4:33 PM   Note    The presence of >/= 3 criteria for malignancy in adrenal cortical tumors (high nuclear grade, mitotic rate > 5 mitoses/50 HPF, atypical mitotic figures, < 25% clear cells, diffuse architecture, tumor necrosis, venous invasion, sinusoidal invasion, capsular invasion) correlates with malignancy  While the capsule is focally disrupted hindering evaluation for capsular invasion, no unequivocal capsular invasion and no other criteria for malignancy are identified in this specimen  Imaging  No results found  I reviewed the above laboratory and imaging data  Discussion/Summary: 60-year-old woman, status post robotic left adrenalectomy for adenoma, possible aldosteronoma  Follow-up in 6 months with repeat scan at that time to assess  Also will repeat blood work including aldosterone levels  All questions answered and copy report given to her for her records

## 2023-03-01 NOTE — LETTER
March 1, 2023     Noah Horton, DO  [facility]    Patient: Wisam Fall   YOB: 1975   Date of Visit: 3/1/2023       Dear Dr Maddie Durham: Thank you for referring Sanjeev Kuhn to me for evaluation  Below are my notes for this consultation  If you have questions, please do not hesitate to call me  I look forward to following your patient along with you  Sincerely,        Mary Abebe MD        CC: MD Mary Addison MD  3/1/2023  3:20 PM  Sign when Signing Visit     Surgical Oncology Follow Up       [facility] [address]    Wisam Fall  1975  [id number]  [facility]  [facility] SURGICAL ONCOLOGY [address] [address]    No chief complaint on file  Assessment/Plan:    No problem-specific Assessment & Plan notes found for this encounter  Diagnoses and all orders for this visit:    History of total adrenalectomy [facility])  -     Basic metabolic panel; Future  -     Aldosterone/Renin Ratio; Future  -     CT abdomen pelvis w contrast; Future       Advance Care Planning/Advance Directives:  Discussed and Did not discuss disease status, cancer treatment plans and/or cancer treatment goals with the patient       Oncology History   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   11/1/2022 Initial Diagnosis    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     12/6/2022 -  Cancer Staged    Staging form: Ovary, Fallopian Tube, Primary Peritoneal, AJCC 8th Edition  - Clinical: FIGO Stage I, calculated as Stage IA (cT1a, cN0, cM0) - Signed by Jose Plasencia MD on 12/6/2022  Histologic grade (G): GB  Histologic grading system: 4 grade system           History of Present Illness: Patient is a 59-year-old woman here for postop check status post robotic left adrenalectomy  She is done well since surgery although she did have a rash at and around the port sites after the operation   -Interval History: Otherwise doing well  Review of Systems:  Review of Systems   Constitutional: Negative  HENT: Negative  Eyes: Negative  Respiratory: Negative  Cardiovascular: Negative  Gastrointestinal: Negative  Endocrine: Negative  Genitourinary: Negative  Musculoskeletal: Negative  Skin: Negative  Allergic/Immunologic:        Skin rash at port sites   Neurological: Negative  Hematological: Negative  Psychiatric/Behavioral: Negative  Patient Active Problem List   Diagnosis   • PONV (postoperative nausea and vomiting)   • HTN (hypertension)   • Hyperlipidemia   • Prediabetes   • Headache   • Adrenal mass (HCC)   • 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   • Family history of cancer   • Hypokalemia   • S/P BSO (bilateral salpingo-oophorectomy)   • Surgical menopause   • History of total adrenalectomy [facility])     Past Medical History:   Diagnosis Date   • Abnormal uterine bleeding    • Cervical polyp    • Chronic diarrhea    • Hyperlipidemia    • Hypertension    • Migraine     Pt was started on Effexor for prevention  Pt states it has helped   • PONV (postoperative nausea and vomiting)     Pt reports having times one in    • Post-operative nausea and vomiting     vomited x3 after procedure 8/3/22   • Prediabetes    • Wears contact lenses      Past Surgical History:   Procedure Laterality Date   •  SECTION     • COLONOSCOPY     • FRACTURE SURGERY Right     Pt had a finger articular surgery for repair middle finger     • HYSTERECTOMY     • HYSTERECTOMY  2022   • HYSTEROSCOPY N/A 2021    Procedure: HYSTEROSCOPY, D&C, POLYPECTOMY;  Surgeon: Cricket Service , DO;  Location:  MAIN OR;  Service: Gynecology   • IN LAPAROSCOPY ADRENALECTOMY PRTL/COMPL TABDL Left 2023    Procedure: LEFT ADRENALECTOMY LAPAROSCOPIC W/ ROBOTICS;  Surgeon: Hailey Lugo MD;  Location: [facility] MAIN OR;  Service: Surgical Oncology   • AR LAPS FULG/EXC OVARY VISCERA/PERITONEAL SURFACE N/A 11/10/2022    Procedure: ROBOT BILATERAL OOPHORECTOMY, STAGING PERITONEAL BIOPSIES, EXCISION OF NODULAR/ABNORMAL APPEARING PERITONEAL AREAS, INFRACOLIC OMENTECTOMY;  Surgeon: Kenna Mitchell MD;  Location: [facility] Main OR;  Service: Gynecology Oncology   • AR LAPS TOTAL HYSTERECT 250 GM/< W/RMVL TUBE/OVARY N/A 08/03/2022    Procedure: DIAGNOSTIC LAPAROSCOPY, ROBOTIC LAPAROSCOPIC HYSTERECTOMY, BILATERAL SALPINGECTOMY;  Surgeon: Arelis Walters DO;  Location: [facility] MAIN OR;  Service: Gynecology   • SHOULDER ARTHROSCOPY Right      Family History   Problem Relation Age of Onset   • Heart disease Mother    • COPD Mother    • Stroke Father    • Leukemia Maternal Grandfather    • Ovarian cancer Cousin      Social History     Socioeconomic History   • Marital status: /Civil Union     Spouse name: Not on file   • Number of children: Not on file   • Years of education: Not on file   • Highest education level: Not on file   Occupational History   • Not on file   Tobacco Use   • Smoking status: Never   • Smokeless tobacco: Never   Vaping Use   • Vaping Use: Never used   Substance and Sexual Activity   • Alcohol use: Yes     Comment: occasionally   • Drug use: Never   • Sexual activity: Not on file     Comment: did not ask   Other Topics Concern   • Not on file   Social History Narrative   • Not on file     Social Determinants of Health     Financial Resource Strain: Not on file   Food Insecurity: Not on file   Transportation Needs: Not on file   Physical Activity: Not on file   Stress: Not on file   Social Connections: Not on file   Intimate Partner Violence: Not on file   Housing Stability: Not on file       Current Outpatient Medications:   •  amLODIPine (NORVASC) 10 mg tablet, Take 10 mg by mouth daily, Disp: , Rfl:   •  atenolol (TENORMIN) 25 mg tablet, Take 25 mg by mouth 2 (two) times a day, Disp: , Rfl:   •  Calcium-Magnesium-Vitamin D (CALCIUM 1200+D3 PO), Take 1 tablet by mouth daily, Disp: , Rfl:   •  fenofibrate (TRICOR) 145 mg tablet, Take 145 mg by mouth daily, Disp: , Rfl:   •  fluticasone (FLONASE) 50 mcg/act nasal spray, 1 spray into each nostril daily, Disp: , Rfl:   •  hydrochlorothiazide (HYDRODIURIL) 25 mg tablet, Take 12 5 mg by mouth daily, Disp: , Rfl:   •  losartan (COZAAR) 100 MG tablet, , Disp: , Rfl:   •  Multiple Vitamins-Minerals (MULTIVITAMIN WOMEN PO), Take 1 tablet by mouth daily, Disp: , Rfl:   •  potassium chloride (K-DUR,KLOR-CON) 20 mEq tablet, Take 20 mEq by mouth daily, Disp: , Rfl:   •  SUMAtriptan (IMITREX) 5 MG/ACT nasal spray, 1 spray into each nostril every 2 (two) hours as needed for migraine, Disp: , Rfl:   •  topiramate (TOPAMAX) 50 MG tablet, Take 50 mg by mouth every 12 (twelve) hours, Disp: , Rfl:   •  valACYclovir (VALTREX) 1,000 mg tablet, Take 1,000 mg by mouth as needed, Disp: , Rfl:   •  acetaminophen (TYLENOL) 325 mg tablet, Take 2 tablets (650 mg total) by mouth every 6 (six) hours as needed for mild pain, Disp: , Rfl: 0  •  docusate sodium (COLACE) 100 mg capsule, Take 1 capsule (100 mg total) by mouth 2 (two) times a day, Disp: , Rfl: 0  Allergies   Allergen Reactions   • Pneumococcal Vaccines Anaphylaxis     Pt states she hallucinated  • Latex Hives     Vitals:    03/01/23 1443   BP: 120/82   Pulse: 82   Resp: 18   Temp: 98 1 °F (36 7 °C)   SpO2: 99%       Physical Exam  Vitals reviewed  Abdominal:      Comments: All incisions clean dry intact  Skin rash or remnants thereof noted around port sites             Results:  Labs:  Case Report   Surgical Pathology Report                         Case: [id number]                                    Authorizing Provider: Collette Rising, MD        Collected:           02/09/2023 4552               Ordering Location:     [address] [place] [address]      Received:            02/09/2023 [address] [place] [address] Operating Room                                                       Pathologist:           Silvestre Patterson MD                                                          Specimen:    Adrenal, Left, Left Adrenal Gland                                                          Final Diagnosis   A  Adrenal, Left, Left Adrenal Gland, adrenalectomy:  - Consistent with adrenal cortical adenoma (3 0 cm)  See Note  -- Special stain reticulin confirms preserved reticulin network  -- Confirmed by positive Melan A, Inhibin, Calretinin (weak), Synaptophysin (focal);         negative PAX8, Glypican 3, Arginase, SOX10, pCEA, Pankeratin (CKAE1/3),         Chromogranin A; low proliferative index (< 1%) on Ki-67 immunostains  Electronically signed by Silvestre Patterson MD on 2/14/2023 at  4:33 PM   Note    The presence of >/= 3 criteria for malignancy in adrenal cortical tumors (high nuclear grade, mitotic rate > 5 mitoses/50 HPF, atypical mitotic figures, < 25% clear cells, diffuse architecture, tumor necrosis, venous invasion, sinusoidal invasion, capsular invasion) correlates with malignancy  While the capsule is focally disrupted hindering evaluation for capsular invasion, no unequivocal capsular invasion and no other criteria for malignancy are identified in this specimen  Imaging  No results found  I reviewed the above laboratory and imaging data  Discussion/Summary: 63-year-old woman, status post robotic left adrenalectomy for adenoma, possible aldosteronoma  Follow-up in 6 months with repeat scan at that time to assess  Also will repeat blood work including aldosterone levels  All questions answered and copy report given to her for her records

## 2023-03-03 ENCOUNTER — APPOINTMENT (OUTPATIENT)
Dept: LAB | Facility: CLINIC | Age: 48
End: 2023-03-03

## 2023-03-03 DIAGNOSIS — D35.02 BENIGN NEOPLASM OF LEFT ADRENAL GLAND: ICD-10-CM

## 2023-03-03 DIAGNOSIS — D39.10 NEOPLASM OF UNCERTAIN BEHAVIOR OF OVARY, UNSPECIFIED LATERALITY: ICD-10-CM

## 2023-03-03 DIAGNOSIS — Z80.41 FAMILY HISTORY OF MALIGNANT NEOPLASM OF OVARY: ICD-10-CM

## 2023-03-03 DIAGNOSIS — Z80.3 FAMILY HISTORY OF MALIGNANT NEOPLASM OF BREAST: ICD-10-CM

## 2023-03-30 ENCOUNTER — TELEPHONE (OUTPATIENT)
Dept: GENETICS | Facility: CLINIC | Age: 48
End: 2023-03-30

## 2023-03-30 NOTE — TELEPHONE ENCOUNTER
Post-Test Genetic Counseling Consult Note  Today I spoke with Day Tapia over the phone to review the results of her genetic test for hereditary cancer  She met previously with Berenice Mckeon on 2/28 for pre-test counseling  A copy of this consult note and genetic test result will be shared with the patient  SUMMARY:    Test(s): CustomNext: Cancer® +RNAinsight® (60 genes): APC, LYLA, AXIN2, BAP1, BARD1, BMPR1A, BRCA1, BRCA2, BRIP1, CDH1, CDK4, CDKN1B, CDKN2A, CHEK2, CTNNA1, DICER1, EGLN1, EPCAM, FH, FLCN, GREM1, HOXB13, KIF1B, MAX, MEN1, MET, MITF, MLH1, MSH2, MSH3, MSH6, MUTYH, NBN, NF1, NTHL1, PALB2, PMS2, POLD1, POLE, POT1, DBYPI8A, PTEN, RAD51C, RAD51D, RB1, RECQL, RET, SDHA, SDHAF2, SDHB, SDHC, SDHD, SMAD4, SMARCA4, STK11, OGUJ189, TP53, TSC1, TSC2, VHL     Result: Negative - No Clinically Significant Variants Detected      Assessment:   A negative result significantly reduces the likelihood that Day Tapia has a hereditary cancer syndrome  However, this testing is unable to completely rule out the presence of hereditary cancer  It remains possible that:  - There is a variant in an area of a gene which was not tested or there is a variant not detectable due to technical limitations of this test      - There is a variant in another gene that was not included in this test or in a gene not known to be linked to cancer or tumors  - A family member has a genetic variant that the patient did not inherit  - The cancer in the family is sporadic and is related to non-hereditary factors  Risks and Testing for Family Members:    Despite a negative result, Ayala's first-degree relatives may be at increased risk for the cancers based on the family history  We recommend they discuss screening and management recommendations with their healthcare providers  At this time we do not recommend testing for Day Tapia 's son based on her negative test result    Day Tapia 's son still needs to consider the history of cancer on the other side of his family when determining his risks  If Cole Estrada has any affected family members with a cancer diagnosis, especially at a young age, they may still consider genetic testing  Relatives who wish to pursue genetic testing can reach out to the [address] [place] [address] Road ([phone]) to schedule an appointment or visit www [facility] org to identify a local genetic counselor  Plan:   There are no additional recommendations based on Ayala's negative result  she should continue cancer screening and medical management as clinically indicated and as determined appropriate by her healthcare providers  Negative Result: Cole Estrada was strongly encouraged to contact us regarding any changes in her personal or family history of cancer as these changes could alter our recommendation regarding genetic testing and/or cancer screening

## 2023-03-31 ENCOUNTER — TELEPHONE (OUTPATIENT)
Dept: GENETICS | Facility: CLINIC | Age: 48
End: 2023-03-31

## 2023-03-31 NOTE — TELEPHONE ENCOUNTER
----- Message from Michael Joaquin sent at 3/30/2023 12:51 PM EDT -----  Regarding: complete  GC Completed Chart     Please send [employer] message with lab result and result note

## 2023-06-08 ENCOUNTER — TELEPHONE (OUTPATIENT)
Dept: GYNECOLOGIC ONCOLOGY | Facility: CLINIC | Age: 48
End: 2023-06-08

## 2023-06-13 ENCOUNTER — OFFICE VISIT (OUTPATIENT)
Dept: GYNECOLOGIC ONCOLOGY | Facility: CLINIC | Age: 48
End: 2023-06-13
Payer: COMMERCIAL

## 2023-06-13 VITALS
HEART RATE: 63 BPM | TEMPERATURE: 97.2 F | HEIGHT: 62 IN | OXYGEN SATURATION: 99 % | SYSTOLIC BLOOD PRESSURE: 112 MMHG | BODY MASS INDEX: 28.34 KG/M2 | WEIGHT: 154 LBS | DIASTOLIC BLOOD PRESSURE: 66 MMHG

## 2023-06-13 DIAGNOSIS — D39.10 SEROUS SURFACE PAPILLARY TUMOR WITH BORDERLINE MALIGNANT FEATURES: Primary | ICD-10-CM

## 2023-06-13 PROBLEM — E27.8 ADRENAL MASS (HCC): Status: RESOLVED | Noted: 2022-11-01 | Resolved: 2023-06-13

## 2023-06-13 PROBLEM — Z90.722 S/P BSO (BILATERAL SALPINGO-OOPHORECTOMY): Status: RESOLVED | Noted: 2022-11-10 | Resolved: 2023-06-13

## 2023-06-13 PROBLEM — E89.40 SURGICAL MENOPAUSE: Status: RESOLVED | Noted: 2022-12-06 | Resolved: 2023-06-13

## 2023-06-13 PROBLEM — Z80.9 FAMILY HISTORY OF CANCER: Status: RESOLVED | Noted: 2022-11-01 | Resolved: 2023-06-13

## 2023-06-13 PROBLEM — Z98.890 PONV (POSTOPERATIVE NAUSEA AND VOMITING): Status: RESOLVED | Noted: 2021-11-05 | Resolved: 2023-06-13

## 2023-06-13 PROBLEM — R11.2 PONV (POSTOPERATIVE NAUSEA AND VOMITING): Status: RESOLVED | Noted: 2021-11-05 | Resolved: 2023-06-13

## 2023-06-13 PROCEDURE — 99213 OFFICE O/P EST LOW 20 MIN: CPT | Performed by: OBSTETRICS & GYNECOLOGY

## 2023-06-15 NOTE — PROGRESS NOTES
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  Staging form: Ovary, Fallopian Tube, Primary Peritoneal, AJCC 8th Edition  - Clinical: FIGO Stage I, calculated as Stage IA (cT1a, cN0, cM0) - Signed by Addie Greenfield MD on 12/6/2022        Previous therapy:  Oncology History   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   11/1/2022 Initial Diagnosis    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     12/6/2022 -  Cancer Staged    Staging form: Ovary, Fallopian Tube, Primary Peritoneal, AJCC 8th Edition  - Clinical: FIGO Stage I, calculated as Stage IA (cT1a, cN0, cM0) - Signed by Addie Greenfield MD on 12/6/2022  Histologic grade (G): GB  Histologic grading system: 4 grade system             Patient ID: Filiberto Garcia is a 52 y o  female  HPI  Patient with stage I serous tumor of the ovary presents for follow-up  Denies vaginal bleeding, drainage or discharge  Previsit  noted and remains low  Scheduled for mammogram by PCP  She has no complaints or concerns  The following portions of the patient's history were reviewed and updated as appropriate: allergies, current medications, past family history, past medical history, past social history, past surgical history and problem list     Review of Systems as per HPI  Review of systems is otherwise unremarkable      Current Outpatient Medications   Medication Sig Dispense Refill   • amLODIPine "(NORVASC) 10 mg tablet Take 10 mg by mouth daily     • atenolol (TENORMIN) 25 mg tablet Take 25 mg by mouth 2 (two) times a day     • Calcium-Magnesium-Vitamin D (CALCIUM 1200+D3 PO) Take 1 tablet by mouth daily     • fenofibrate (TRICOR) 145 mg tablet Take 145 mg by mouth daily     • fluticasone (FLONASE) 50 mcg/act nasal spray 1 spray into each nostril daily     • hydrochlorothiazide (HYDRODIURIL) 25 mg tablet Take 12 5 mg by mouth daily     • losartan (COZAAR) 100 MG tablet      • Multiple Vitamins-Minerals (MULTIVITAMIN WOMEN PO) Take 1 tablet by mouth daily     • SUMAtriptan (IMITREX) 5 MG/ACT nasal spray 1 spray into each nostril every 2 (two) hours as needed for migraine     • topiramate (TOPAMAX) 50 MG tablet Take 50 mg by mouth every 12 (twelve) hours     • valACYclovir (VALTREX) 1,000 mg tablet Take 1,000 mg by mouth as needed       No current facility-administered medications for this visit  Objective:    Blood pressure 112/66, pulse 63, temperature (!) 97 2 °F (36 2 °C), height 5' 2\" (1 575 m), weight 69 9 kg (154 lb), last menstrual period 07/05/2022, SpO2 99 %  Body mass index is 28 17 kg/m²  Body surface area is 1 71 meters squared  Physical Exam  Vitals reviewed  Exam conducted with a chaperone present  Eyes:      General: No scleral icterus  Right eye: No discharge  Left eye: No discharge  Conjunctiva/sclera: Conjunctivae normal    Pulmonary:      Effort: Pulmonary effort is normal    Abdominal:      General: There is no distension  Palpations: Abdomen is soft  There is no mass  Tenderness: There is no abdominal tenderness  There is no guarding  Genitourinary:     Comments: Normal external female genitalia  Normal Bartholin's and [place]'s glands  Normal urethral meatus and no evidence of urethral discharge or masses  Bladder without fullness mass or tenderness  Vagina without lesion or discharge  No significant pelvic organ prolapse noted    Cervix and " uterus are surgically absent  Bimanual exam demonstrates no evidence of pelvic masses  Anus without fissure of lesion  Musculoskeletal:      Right lower leg: No edema  Left lower leg: No edema         Ashish Roberto MD, [facility]  6/15/2023  6:56 AM

## 2023-06-15 NOTE — ASSESSMENT & PLAN NOTE
Patient has no clinical evidence of disease   remains low  Plan to see her back in 6 months for routine surveillance with previsit   She will contact us if she has any new symptoms

## 2023-08-31 ENCOUNTER — TELEPHONE (OUTPATIENT)
Dept: HEMATOLOGY ONCOLOGY | Facility: CLINIC | Age: 48
End: 2023-08-31

## 2023-08-31 NOTE — TELEPHONE ENCOUNTER
CLEMENTINE for patient regarding the appt.  On 9/6/23 with Dr. Amrita Laughlin as this appointment was cancelled via [address] [place] [address]

## 2023-09-12 ENCOUNTER — TELEPHONE (OUTPATIENT)
Dept: SURGICAL ONCOLOGY | Facility: CLINIC | Age: 48
End: 2023-09-12

## 2023-09-12 NOTE — TELEPHONE ENCOUNTER
LM asking patient to call back to my teams number to discuss rescheduling CT and f/u visit with Dr. Bo Shelby

## 2023-09-25 ENCOUNTER — HOSPITAL ENCOUNTER (OUTPATIENT)
Dept: CT IMAGING | Facility: HOSPITAL | Age: 48
Discharge: HOME | End: 2023-09-25
Payer: COMMERCIAL

## 2023-09-25 DIAGNOSIS — E89.6 HISTORY OF TOTAL ADRENALECTOMY (HCC): ICD-10-CM

## 2023-09-25 PROCEDURE — G1004 CDSM NDSC: HCPCS

## 2023-09-25 PROCEDURE — 74177 CT ABD & PELVIS W/CONTRAST: CPT

## 2023-09-25 RX ADMIN — IOHEXOL 100 ML: 350 INJECTION, SOLUTION INTRAVENOUS at 19:17

## 2023-11-27 ENCOUNTER — APPOINTMENT (OUTPATIENT)
Dept: LAB | Facility: CLINIC | Age: 48
End: 2023-11-27
Payer: COMMERCIAL

## 2023-11-27 DIAGNOSIS — D39.10 SEROUS SURFACE PAPILLARY TUMOR WITH BORDERLINE MALIGNANT FEATURES: ICD-10-CM

## 2023-11-27 LAB — CANCER AG125 SERPL-ACNC: 2.6 U/ML (ref 0–35)

## 2023-11-27 PROCEDURE — 86304 IMMUNOASSAY TUMOR CA 125: CPT

## 2023-11-27 PROCEDURE — 36415 COLL VENOUS BLD VENIPUNCTURE: CPT

## 2023-12-05 ENCOUNTER — OFFICE VISIT (OUTPATIENT)
Dept: GYNECOLOGIC ONCOLOGY | Facility: CLINIC | Age: 48
End: 2023-12-05
Payer: COMMERCIAL

## 2023-12-05 VITALS
HEART RATE: 66 BPM | SYSTOLIC BLOOD PRESSURE: 130 MMHG | TEMPERATURE: 97.6 F | OXYGEN SATURATION: 98 % | BODY MASS INDEX: 28.08 KG/M2 | WEIGHT: 152.6 LBS | HEIGHT: 62 IN | DIASTOLIC BLOOD PRESSURE: 78 MMHG

## 2023-12-05 DIAGNOSIS — D39.10 SEROUS SURFACE PAPILLARY TUMOR WITH BORDERLINE MALIGNANT FEATURES: Primary | ICD-10-CM

## 2023-12-05 PROCEDURE — 99213 OFFICE O/P EST LOW 20 MIN: CPT | Performed by: OBSTETRICS & GYNECOLOGY

## 2023-12-05 RX ORDER — ATENOLOL 50 MG/1
TABLET ORAL
COMMUNITY
Start: 2023-09-22

## 2023-12-05 RX ORDER — HYDROCHLOROTHIAZIDE 12.5 MG/1
TABLET ORAL
COMMUNITY
Start: 2023-09-22

## 2023-12-05 NOTE — PROGRESS NOTES
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 Patient has no evidence of disease. I plan to see her back in 6 months with previsit . 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 female  HPI  Patient with history of stage Ia borderline tumor of the ovary. Presents for follow-up. Denies vaginal bleeding, drainage or discharge. Denies pelvic or abdominal pain. Occasional hot flashes, not truly affecting quality of life. The following portions of the patient's history were reviewed and updated as appropriate: allergies, current medications, past family history, past medical history, past social history, past surgical history, and problem list.    Review of Systems  .   Current Outpatient Medications   Medication Sig Dispense Refill    amLODIPine (NORVASC) 10 mg tablet Take 10 mg by mouth daily      atenolol (TENORMIN) 50 mg tablet       Calcium-Magnesium-Vitamin D (CALCIUM 1200+D3 PO) Take 1 tablet by mouth daily      fenofibrate (TRICOR) 145 mg tablet Take 145 mg by mouth daily      fluticasone (FLONASE) 50 mcg/act nasal spray 1 spray into each nostril daily      hydrochlorothiazide (HYDRODIURIL) 12.5 mg tablet       losartan (COZAAR) 100 MG tablet       Multiple Vitamins-Minerals (MULTIVITAMIN WOMEN PO) Take 1 tablet by mouth daily      SUMAtriptan (IMITREX) 5 MG/ACT nasal spray 1 spray into each nostril every 2 (two) hours as needed for migraine      topiramate (TOPAMAX) 50 MG tablet Take 50 mg by mouth every 12 (twelve) hours      valACYclovir (VALTREX) 1,000 mg tablet Take 1,000 mg by mouth as needed       No current facility-administered medications for this visit. Objective:    Blood pressure 130/78, pulse 66, temperature 97.6 °F (36.4 °C), temperature source Temporal, height 5' 2" (1.575 m), weight 69.2 kg (152 lb 9.6 oz), last menstrual period 07/05/2022, SpO2 98 %. Body mass index is 27.91 kg/m². Body surface area is 1.7 meters squared. Physical Exam  Vitals reviewed. Exam conducted with a chaperone present. Eyes:      General: No scleral icterus. Right eye: No discharge. Left eye: No discharge. Conjunctiva/sclera: Conjunctivae normal.   Cardiovascular:      Rate and Rhythm: Normal rate and regular rhythm. Heart sounds: Normal heart sounds. No murmur heard. Pulmonary:      Effort: Pulmonary effort is normal. No respiratory distress. Breath sounds: No stridor. No wheezing or rhonchi. Abdominal:      General: There is no distension. Palpations: Abdomen is soft. There is no mass. Tenderness: There is no abdominal tenderness. There is no guarding. Hernia: No hernia is present. Genitourinary:     Comments: Normal external female genitalia. Normal Bartholin's and [place]'s glands. Normal urethral meatus and no evidence of urethral discharge or masses.   Bladder without fullness mass or tenderness. Vagina without lesion or discharge. No significant pelvic organ prolapse noted. Cervix and uterus are surgically absent. Bimanual exam demonstrates no evidence of pelvic masses. Anus without fissure of lesion. Musculoskeletal:      Right lower leg: No edema. Left lower leg: No edema.         Trend:  Lab Results   Component Value Date     2.6 11/27/2023     8.2 08/17/2022     Stepan Mendez MD, [id number] [facility]  12/5/2023  4:13 PM

## 2023-12-05 NOTE — ASSESSMENT & PLAN NOTE
Previsit  reviewed, presently less than 3 units/mL. Patient has no evidence of disease. I plan to see her back in 6 months with previsit .   She will contact me if she has any new symptom

## 2023-12-06 ENCOUNTER — OFFICE VISIT (OUTPATIENT)
Dept: OBGYN CLINIC | Facility: CLINIC | Age: 48
End: 2023-12-06
Payer: COMMERCIAL

## 2023-12-06 ENCOUNTER — HOSPITAL ENCOUNTER (OUTPATIENT)
Dept: RADIOLOGY | Facility: CLINIC | Age: 48
Discharge: HOME | End: 2023-12-06
Payer: COMMERCIAL

## 2023-12-06 VITALS
TEMPERATURE: 97.9 F | SYSTOLIC BLOOD PRESSURE: 120 MMHG | WEIGHT: 152 LBS | BODY MASS INDEX: 27.97 KG/M2 | DIASTOLIC BLOOD PRESSURE: 76 MMHG | HEIGHT: 62 IN | HEART RATE: 66 BPM

## 2023-12-06 DIAGNOSIS — M25.511 RIGHT SHOULDER PAIN, UNSPECIFIED CHRONICITY: ICD-10-CM

## 2023-12-06 DIAGNOSIS — M75.81 RIGHT ROTATOR CUFF TENDONITIS: ICD-10-CM

## 2023-12-06 DIAGNOSIS — M75.41 IMPINGEMENT SYNDROME OF RIGHT SHOULDER: ICD-10-CM

## 2023-12-06 DIAGNOSIS — M25.511 ACUTE PAIN OF RIGHT SHOULDER: Primary | ICD-10-CM

## 2023-12-06 PROCEDURE — 99204 OFFICE O/P NEW MOD 45 MIN: CPT | Performed by: STUDENT IN AN ORGANIZED HEALTH CARE EDUCATION/TRAINING PROGRAM

## 2023-12-06 PROCEDURE — 20610 DRAIN/INJ JOINT/BURSA W/O US: CPT | Performed by: STUDENT IN AN ORGANIZED HEALTH CARE EDUCATION/TRAINING PROGRAM

## 2023-12-06 PROCEDURE — 73030 X-RAY EXAM OF SHOULDER: CPT

## 2023-12-06 RX ORDER — TRIAMCINOLONE ACETONIDE 40 MG/ML
40 INJECTION, SUSPENSION INTRA-ARTICULAR; INTRAMUSCULAR
Status: COMPLETED | OUTPATIENT
Start: 2023-12-06 | End: 2023-12-06

## 2023-12-06 RX ORDER — BUPIVACAINE HYDROCHLORIDE 2.5 MG/ML
2 INJECTION, SOLUTION INFILTRATION; PERINEURAL
Status: COMPLETED | OUTPATIENT
Start: 2023-12-06 | End: 2023-12-06

## 2023-12-06 RX ADMIN — BUPIVACAINE HYDROCHLORIDE 2 ML: 2.5 INJECTION, SOLUTION INFILTRATION; PERINEURAL at 17:30

## 2023-12-06 RX ADMIN — TRIAMCINOLONE ACETONIDE 40 MG: 40 INJECTION, SUSPENSION INTRA-ARTICULAR; INTRAMUSCULAR at 17:30

## 2023-12-06 NOTE — PROGRESS NOTES
1. Acute pain of right shoulder  XR shoulder 2+ vw right    Ambulatory Referral to Physical Therapy    Large joint arthrocentesis: R subacromial bursa      2. Right rotator cuff tendonitis  Ambulatory Referral to Physical Therapy    Large joint arthrocentesis: R subacromial bursa      3. Impingement syndrome of right shoulder  Ambulatory Referral to Physical Therapy    Large joint arthrocentesis: R subacromial bursa        Orders Placed This Encounter   Procedures    Large joint arthrocentesis: R subacromial bursa    XR shoulder 2+ vw right    Ambulatory Referral to Physical Therapy        Imaging Studies (I personally reviewed images in PACS and report):    X-ray right shoulder 12/6/2023: No acute osseous abnormalities. Mild AC arthritis. MRI right shoulder without contrast 7/31/2013: Mike Gordon. Only report is available noting  1. Rotator cuff tendinosis. No definite tear identified. 2. Nonaggressive lesions within the proximal humerus as above. 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 There is a 0.6 cm cartilaginous rest versus enchondroma noted within the medial aspect of the proximal humeral metaphysis)  3. Mild acromioclavicular osteoarthrosis with capsular hypertrophy. IMPRESSION:  Acute atraumatic right shoulder pain over the past 2 months  Clinical history and exam Ddx: impingement syndrome/tendinosis/bursitis, bicipital tendinitis, adhesive capsulitis  Radiographic imaging unremarkable    Other factors:  Patient has had similar pain in the past and there was a noted MRI from 2013 noting rotator cuff tendinosis without tear. Patient states she did undergo an arthroscopic procedure of her shoulder but I cannot find details on what was performed.   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 I have discussed with the patient the pathophysiology of this diagnosis and reviewed how the examination correlates with this diagnosis. Imaging obtained of her right shoulder today as noted above. I will follow-up official radiology interpretation. Treatment options were discussed at length, including risks and benefits; after discussing these treatment options, the patient elected for a right subacromial cortisone injection as per procedure note below. Counseled that we can repeat the injection every 3 to 4 months as needed in regards to pain control. Furthermore in regards to short and long-term treatment and prevention of this pain in the future I encouraged either attending formal physical therapy or home exercise program.  Patient agreeable to formal PT and a referral was placed. I also supplemented her with a home exercise program.  Counseled importance of daily adherence. I counseled her she can continue as needed use of acetaminophen, NSAIDs while waiting for cortisone to take effect. Return in about 6 weeks (around 1/17/2024). Portions of the record may have been created with voice recognition software. Occasional wrong word or "sound a like" substitutions may have occurred due to the inherent limitations of voice recognition software. Read the chart carefully and recognize, using context, where substitutions have occurred. CHIEF COMPLAINT:  Chief Complaint   Patient presents with    Right Shoulder - Pain         HPI:  Karel Romo is a 50 y.o. female  who presents for       Visit 12/6/2023:  Initial evaluation of right shoulder pain  Of note on chart review patient has a history of right shoulder arthroscopy in 2013. Patient is unsure exactly what the details were. She states there was relief from this procedure at that time.   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 She participates in repetitive manual labor activities above shoulder height. She noticed over the past 2 months that she was having progressive worsening pain over the anterior lateral aspects of her shoulder. She describes it as a tight/aching/sharp pain of moderate to severe intensity. She also noticed worsening stiffness of her shoulder and difficulty reaching above shoulder height and behind her back over time. She reports intermittent crepitus of her shoulder with range of motion movements but denies shoulder swelling, discoloration, or radiation of the pain. She reports the pain has been affecting her ability to sleep at night as she cannot tolerate lying on her right shoulder. In regards to pain control she has been using Tylenol, NSAIDs, heat/ice therapy with minimal to no relief. She has not had injection of her shoulder since this pain started. She has not seen formal physical therapy for this issue. She has not  had imaging done of her shoulder since pain started. Medical, Surgical, Family, and Social History    Past Medical History:   Diagnosis Date    Abnormal uterine bleeding     Cervical polyp     Chronic diarrhea     Hyperlipidemia     Hypertension     Migraine     Pt was started on Effexor for prevention. Pt states it has helped    PONV (postoperative nausea and vomiting)     Pt reports having times one in     Post-operative nausea and vomiting     vomited x3 after procedure 8/3/22    Prediabetes     Wears contact lenses      Past Surgical History:   Procedure Laterality Date    ADRENAL GLAND SURGERY  2023     SECTION      COLONOSCOPY      FRACTURE SURGERY Right     Pt had a finger articular surgery for repair middle finger.     HYSTERECTOMY      HYSTERECTOMY  2022    HYSTEROSCOPY N/A 2021    Procedure: HYSTEROSCOPY, D&C, POLYPECTOMY;  Surgeon: Namita Thacker., DO;  Location:  MAIN OR;  Service: Gynecology    VA LAPAROSCOPY ADRENALECTOMY PRTL/COMPL TABDL Left 02/09/2023    Procedure: LEFT ADRENALECTOMY LAPAROSCOPIC W/ ROBOTICS;  Surgeon: Lavinia Dean MD;  Location: [facility];  Service: Surgical Oncology    VA LAPS FULG/EXC OVARY VISCERA/PERITONEAL SURFACE N/A 11/10/2022    Procedure: ROBOT BILATERAL OOPHORECTOMY, STAGING PERITONEAL BIOPSIES, EXCISION OF NODULAR/ABNORMAL APPEARING PERITONEAL AREAS, INFRACOLIC OMENTECTOMY;  Surgeon: Peri Sheets MD;  Location: [facility];  Service: Gynecology Oncology    VA LAPS TOTAL HYSTERECT 250 GM/< W/RMVL TUBE/OVARY N/A 08/03/2022    Procedure: DIAGNOSTIC LAPAROSCOPY, ROBOTIC LAPAROSCOPIC HYSTERECTOMY, BILATERAL SALPINGECTOMY;  Surgeon: Rajinder Santos, ;  Location: [facility];  Service: Gynecology    SHOULDER ARTHROSCOPY Right      Social History   Social History     Substance and Sexual Activity   Alcohol Use Yes    Comment: occasionally     Social History     Substance and Sexual Activity   Drug Use Never     Social History     Tobacco Use   Smoking Status Never   Smokeless Tobacco Never     Family History   Problem Relation Age of Onset    Heart disease Mother     COPD Mother     Stroke Father     Leukemia Maternal Grandfather     Ovarian cancer Cousin      Allergies   Allergen Reactions    Pneumococcal Vaccines Anaphylaxis     Pt states she hallucinated. Latex Hives          Physical Exam  /76   Pulse 66   Temp 97.9 °F (36.6 °C)   Ht 5' 2" (1.575 m)   Wt 68.9 kg (152 lb)   LMP 07/05/2022 (Exact Date)   BMI 27.80 kg/m²     Constitutional:  see vital signs  Gen: well-developed, normocephalic/atraumatic, well-groomed  Eyes: No inflammation or discharge of conjunctiva or lids; sclera clear   Pulmonary/Chest: Effort normal. No respiratory distress.      Ortho Exam  Shoulder exam:       RIGHT    Inspection Erythema None     Swelling None     Increased Warmth None    Rotator cuff ER 5-/5     IR 5/5     Abduction 5-/5    ROM  Abduction 90     ER0 60     IRb T7    TTP:  +lateral aspect over greater tuberosity, +anterior aspect of glenohumeral joint line    Special Tests: O'Briens  (FF 90, add 10, resist thumbs up-, resist thumbs down+) Negative slap    Crank  (Abd 90, axial load, rotate) Negative slap    Cross body Adduction Negative     Speeds  Positive     Yergason's Negative     Drop arm Negative     Neer Positive     Correa Positive        Right  elbow, wrist, and and forearm ROM full, painless with passive ROM, no ttp or crepitance throughout extremities below shoulder joint            Large joint arthrocentesis: R subacromial bursa  Universal Protocol:  Consent: Verbal consent obtained. Risks and benefits: risks, benefits and alternatives were discussed  Consent given by: patient  Patient understanding: patient states understanding of the procedure being performed  Site marked: the operative site was marked  Radiology Images displayed and confirmed.  If images not available, report reviewed: imaging studies available  Supporting Documentation  Indications: pain   Procedure Details  Location: shoulder - R subacromial bursa  Preparation: Patient was prepped and draped in the usual sterile fashion  Needle size: 22 G  Ultrasound guidance: no  Approach: posterior  Medications administered: 40 mg triamcinolone acetonide 40 mg/mL; 2 mL bupivacaine 0.25 %    Patient tolerance: patient tolerated the procedure well with no immediate complications  Dressing:  Sterile dressing applied

## 2024-01-24 ENCOUNTER — OFFICE VISIT (OUTPATIENT)
Dept: OBGYN CLINIC | Facility: CLINIC | Age: 49
End: 2024-01-24
Payer: COMMERCIAL

## 2024-01-24 VITALS
BODY MASS INDEX: 27.97 KG/M2 | SYSTOLIC BLOOD PRESSURE: 130 MMHG | HEART RATE: 81 BPM | DIASTOLIC BLOOD PRESSURE: 78 MMHG | HEIGHT: 62 IN | TEMPERATURE: 97.7 F | WEIGHT: 152 LBS

## 2024-01-24 DIAGNOSIS — M75.41 IMPINGEMENT SYNDROME OF RIGHT SHOULDER: ICD-10-CM

## 2024-01-24 DIAGNOSIS — M25.511 CHRONIC RIGHT SHOULDER PAIN: Primary | ICD-10-CM

## 2024-01-24 DIAGNOSIS — M75.81 RIGHT ROTATOR CUFF TENDONITIS: ICD-10-CM

## 2024-01-24 DIAGNOSIS — G89.29 CHRONIC RIGHT SHOULDER PAIN: Primary | ICD-10-CM

## 2024-01-24 PROCEDURE — 99213 OFFICE O/P EST LOW 20 MIN: CPT | Performed by: STUDENT IN AN ORGANIZED HEALTH CARE EDUCATION/TRAINING PROGRAM

## 2024-01-24 NOTE — PROGRESS NOTES
1. Chronic right shoulder pain  MRI shoulder right wo contrast      2. Right rotator cuff tendonitis  MRI shoulder right wo contrast      3. Impingement syndrome of right shoulder  MRI shoulder right wo contrast        Orders Placed This Encounter   Procedures    MRI shoulder right wo contrast        Imaging Studies (I personally reviewed images in PACS and report):    X-ray right shoulder 12/6/2023: No acute osseous abnormalities.  Mild AC arthritis.    MRI right shoulder without contrast 7/31/2013: [facility].  Only report is available noting  1.  Rotator cuff tendinosis.  No definite tear identified.   2. Nonaggressive lesions within the proximal humerus as above.  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  There is a 0.6 cm cartilaginous rest versus enchondroma noted within the medial aspect of the proximal humeral metaphysis)  3. Mild acromioclavicular osteoarthrosis with capsular hypertrophy.      IMPRESSION:  Chronic  atraumatic right shoulder pain   Clinical history and exam Ddx: impingement syndrome/tendinosis/bursitis, bicipital tendinitis, adhesive capsulitis  Radiographic imaging unremarkable other than AC arthritis  Some relief from subacromial CSI in regards to shoulder motion, but continues to c/o pain while working    Other factors:  Patient has had similar pain in the past and there was a noted MRI from 2013 noting rotator cuff tendinosis without tear.  Patient states she did undergo an arthroscopic procedure of her shoulder but I do not have access to records to note what was performed.  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 I have discussed with the patient the pathophysiology of this diagnosis and reviewed  "how the examination correlates with this diagnosis.    Reassured patient of her progressive improvement since last visit.  Given she still feels aggravation of her shoulders pain despite improvement of her range of motion, I have ordered an MRI of her right shoulder without contrast for further evaluation to rule out potential rotator cuff degenerative injury.  In the interim, I counseled continued adherence to her home exercise program where she can always attend formal physical therapy with [facility] or physical therapy closer to her home based on what her insurance can cover.    Return for Follow up after MRI of right shoulder.    Portions of the record may have been created with voice recognition software. Occasional wrong word or \"sound a like\" substitutions may have occurred due to the inherent limitations of voice recognition software. Read the chart carefully and recognize, using context, where substitutions have occurred.     CHIEF COMPLAINT:  Chief Complaint   Patient presents with    Follow-up         HPI:  Ayala Ferreira is a 48 y.o. female  who presents for     Visit 1/24/2024:  Follow-up evaluation of right shoulder pain  Patient reports some improvement from subacromial cortisone injection provided last visit.  She states she has been able to perform activities of daily living with less intense pain and she has improved range of motion of her shoulder.  She also notes that she has been able to sleep without pain of her shoulder which she could not do previously.  She states she has been adherent to the home exercise program/stretches provided as she was not able to accommodate enough time to attend formal PT.  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  Denies any shoulder swelling, discoloration, N/T of her right " upper extremity.  Denies any new injury of her right shoulder since last visit.      Medical, Surgical, Family, and Social History    Past Medical History:   Diagnosis Date    Abnormal uterine bleeding     Cervical polyp     Chronic diarrhea     Hyperlipidemia     Hypertension     Migraine     Pt was started on Effexor for prevention. Pt states it has helped    PONV (postoperative nausea and vomiting)     Pt reports having times one in     Post-operative nausea and vomiting     vomited x3 after procedure 8/3/22    Prediabetes     Wears contact lenses      Past Surgical History:   Procedure Laterality Date    ADRENAL GLAND SURGERY  2023     SECTION      COLONOSCOPY      FRACTURE SURGERY Right     Pt had a finger articular surgery for repair middle finger.    HYSTERECTOMY      HYSTERECTOMY  2022    HYSTEROSCOPY N/A 2021    Procedure: HYSTEROSCOPY, D&C, POLYPECTOMY;  Surgeon: Jamaal Schuster Jr., DO;  [facility]: [facility];  Service: Gynecology    UT LAPAROSCOPY ADRENALECTOMY PRTL/COMPL TABDL Left 2023    Procedure: LEFT ADRENALECTOMY LAPAROSCOPIC W/ ROBOTICS;  Surgeon: Francisco Javier Preciado MD;  [facility];  Service: Surgical Oncology    UT LAPS FULG/EXC OVARY VISCERA/PERITONEAL SURFACE N/A 11/10/2022    Procedure: ROBOT BILATERAL OOPHORECTOMY, STAGING PERITONEAL BIOPSIES, EXCISION OF NODULAR/ABNORMAL APPEARING PERITONEAL AREAS, INFRACOLIC OMENTECTOMY;  Surgeon: Rao Adan MD;  Location: [facility];  Service: Gynecology Oncology    UT LAPS TOTAL HYSTERECT 250 GM/< W/RMVL TUBE/OVARY N/A 2022    Procedure: DIAGNOSTIC LAPAROSCOPY, ROBOTIC LAPAROSCOPIC HYSTERECTOMY, BILATERAL SALPINGECTOMY;  Surgeon: Jamaal Schuster Jr., DO;  [facility]: [facility];  Service: Gynecology    SHOULDER ARTHROSCOPY Right     SHOULDER SURGERY  2013    Impingement/debridement/bursa sack removal     Social History   Social History     Substance and Sexual Activity   Alcohol Use Yes     "Alcohol/week: 3.0 standard drinks of alcohol    Types: 3 Cans of beer per week    Comment: occasionally     Social History     Substance and Sexual Activity   Drug Use Never     Social History     Tobacco Use   Smoking Status Never   Smokeless Tobacco Never     Family History   Problem Relation Age of Onset    Heart disease Mother     COPD Mother     Diabetes Mother     Stroke Father     Diabetes Father     Leukemia Maternal Grandfather     Cancer Maternal Grandfather         Leukemia    Ovarian cancer Cousin      Allergies   Allergen Reactions    Pneumococcal Vaccines Anaphylaxis     Pt states she hallucinated.     Latex Hives          Physical Exam  /78   Pulse 81   Temp 97.7 °F (36.5 °C) (Temporal)   Ht 5' 2\" (1.575 m)   Wt 68.9 kg (152 lb)   LMP 07/05/2022 (Exact Date)   BMI 27.80 kg/m²     Constitutional:  see vital signs  Gen: well-developed, normocephalic/atraumatic, well-groomed  Eyes: No inflammation or discharge of conjunctiva or lids; sclera clear   Pulmonary/Chest: Effort normal. No respiratory distress.     Ortho Exam  Shoulder exam:       RIGHT    Inspection Erythema None     Swelling None     Increased Warmth None    Rotator cuff ER 5/5     IR 5/5     Abduction 5-/5    ROM      Abduction 150     ER0 60     IRb T7    TTP:  +anterior aspect of glenohumeral joint line    Special Tests: O'Briens  (FF 90, add 10, resist thumbs up-, resist thumbs down+) Negative slap    Crank  (Abd 90, axial load, rotate) Negative slap    Cross body Adduction Negative     Speeds  Negative     Yergason's Negative     Drop arm Negative     Neer Positive     Correa Positive        Right  elbow, wrist, and and forearm ROM full, painless with passive ROM, no ttp or crepitance throughout extremities below shoulder joint            Procedures        "

## 2024-02-02 ENCOUNTER — HOSPITAL ENCOUNTER (OUTPATIENT)
Dept: MRI IMAGING | Facility: HOSPITAL | Age: 49
End: 2024-02-02
Attending: STUDENT IN AN ORGANIZED HEALTH CARE EDUCATION/TRAINING PROGRAM
Payer: COMMERCIAL

## 2024-02-02 DIAGNOSIS — G89.29 CHRONIC RIGHT SHOULDER PAIN: ICD-10-CM

## 2024-02-02 DIAGNOSIS — M75.41 IMPINGEMENT SYNDROME OF RIGHT SHOULDER: ICD-10-CM

## 2024-02-02 DIAGNOSIS — M25.511 CHRONIC RIGHT SHOULDER PAIN: ICD-10-CM

## 2024-02-02 DIAGNOSIS — M75.81 RIGHT ROTATOR CUFF TENDONITIS: ICD-10-CM

## 2024-02-02 PROCEDURE — 73221 MRI JOINT UPR EXTREM W/O DYE: CPT

## 2024-02-02 PROCEDURE — G1004 CDSM NDSC: HCPCS

## 2024-03-06 ENCOUNTER — TELEPHONE (OUTPATIENT)
Dept: OBGYN CLINIC | Facility: CLINIC | Age: 49
End: 2024-03-06

## 2024-03-06 NOTE — TELEPHONE ENCOUNTER
Called and left voicemail for patient that their appointment scheduled for 3/6/24 with Dr. Lomas needs to be rescheduled. Doctor will not be in office at that time. Left voicemail previously on 3/5 as well. Gave scheduling phone number for them to call for rescheduling.     [phone]

## 2024-03-20 ENCOUNTER — HOSPITAL ENCOUNTER (OUTPATIENT)
Dept: RADIOLOGY | Facility: HOSPITAL | Age: 49
Discharge: HOME | End: 2024-03-20
Payer: COMMERCIAL

## 2024-03-20 ENCOUNTER — OFFICE VISIT (OUTPATIENT)
Dept: OBGYN CLINIC | Facility: CLINIC | Age: 49
End: 2024-03-20
Payer: COMMERCIAL

## 2024-03-20 VITALS
TEMPERATURE: 97.8 F | WEIGHT: 154 LBS | HEIGHT: 62 IN | BODY MASS INDEX: 28.34 KG/M2 | DIASTOLIC BLOOD PRESSURE: 85 MMHG | SYSTOLIC BLOOD PRESSURE: 120 MMHG | HEART RATE: 73 BPM

## 2024-03-20 DIAGNOSIS — M79.2 RADICULAR PAIN IN RIGHT ARM: ICD-10-CM

## 2024-03-20 DIAGNOSIS — G89.29 CHRONIC RIGHT SHOULDER PAIN: Primary | ICD-10-CM

## 2024-03-20 DIAGNOSIS — M54.2 CHRONIC NECK PAIN: ICD-10-CM

## 2024-03-20 DIAGNOSIS — M43.6 NECK STIFFNESS: ICD-10-CM

## 2024-03-20 DIAGNOSIS — G89.29 CHRONIC NECK PAIN: ICD-10-CM

## 2024-03-20 DIAGNOSIS — M25.511 CHRONIC RIGHT SHOULDER PAIN: Primary | ICD-10-CM

## 2024-03-20 DIAGNOSIS — M25.611 STIFFNESS OF RIGHT SHOULDER JOINT: ICD-10-CM

## 2024-03-20 PROCEDURE — 72040 X-RAY EXAM NECK SPINE 2-3 VW: CPT

## 2024-03-20 PROCEDURE — 99214 OFFICE O/P EST MOD 30 MIN: CPT | Performed by: STUDENT IN AN ORGANIZED HEALTH CARE EDUCATION/TRAINING PROGRAM

## 2024-03-20 PROCEDURE — 20610 DRAIN/INJ JOINT/BURSA W/O US: CPT | Performed by: STUDENT IN AN ORGANIZED HEALTH CARE EDUCATION/TRAINING PROGRAM

## 2024-03-20 RX ORDER — METHOCARBAMOL 500 MG/1
500 TABLET, FILM COATED ORAL 2 TIMES DAILY
Qty: 60 TABLET | Refills: 1 | Status: SHIPPED | OUTPATIENT
Start: 2024-03-20

## 2024-03-20 RX ORDER — GABAPENTIN 300 MG/1
300 CAPSULE ORAL 3 TIMES DAILY
Qty: 90 CAPSULE | Refills: 1 | Status: SHIPPED | OUTPATIENT
Start: 2024-03-20

## 2024-03-20 RX ORDER — BUPIVACAINE HYDROCHLORIDE 2.5 MG/ML
2 INJECTION, SOLUTION INFILTRATION; PERINEURAL
Status: COMPLETED | OUTPATIENT
Start: 2024-03-20 | End: 2024-03-20

## 2024-03-20 RX ORDER — TRIAMCINOLONE ACETONIDE 40 MG/ML
40 INJECTION, SUSPENSION INTRA-ARTICULAR; INTRAMUSCULAR
Status: COMPLETED | OUTPATIENT
Start: 2024-03-20 | End: 2024-03-20

## 2024-03-20 RX ADMIN — TRIAMCINOLONE ACETONIDE 40 MG: 40 INJECTION, SUSPENSION INTRA-ARTICULAR; INTRAMUSCULAR at 17:45

## 2024-03-20 RX ADMIN — BUPIVACAINE HYDROCHLORIDE 2 ML: 2.5 INJECTION, SOLUTION INFILTRATION; PERINEURAL at 17:45

## 2024-03-20 NOTE — PROGRESS NOTES
1. Chronic right shoulder pain  gabapentin (NEURONTIN) 300 mg capsule    methocarbamol (ROBAXIN) 500 mg tablet    Large joint arthrocentesis: R glenohumeral      2. Chronic neck pain  XR spine cervical 2 or 3 vw injury    gabapentin (NEURONTIN) 300 mg capsule    methocarbamol (ROBAXIN) 500 mg tablet      3. Neck stiffness  XR spine cervical 2 or 3 vw injury    gabapentin (NEURONTIN) 300 mg capsule    methocarbamol (ROBAXIN) 500 mg tablet      4. Radicular pain in right arm  gabapentin (NEURONTIN) 300 mg capsule    methocarbamol (ROBAXIN) 500 mg tablet      5. Stiffness of right shoulder joint  Large joint arthrocentesis: R glenohumeral        Orders Placed This Encounter   Procedures    Large joint arthrocentesis: R glenohumeral    XR spine cervical 2 or 3 vw injury        Imaging Studies (I personally reviewed images in PACS and report):    MRI right shoulder without contrast 2/2/2024: Noted to have a small posterior superior glenoid labral tear.  Otherwise intact rotator cuff.  Small enchondroma of the humeral head.    X-ray right shoulder 12/6/2023: No acute osseous abnormalities.  Mild AC arthritis.    MRI right shoulder without contrast 7/31/2013: [facility].  Only report is available noting  1.  Rotator cuff tendinosis.  No definite tear identified.   2. Nonaggressive lesions within the proximal humerus as above.  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  There is a 0.6 cm cartilaginous rest versus enchondroma noted within the medial aspect of the proximal humeral metaphysis)  3. Mild acromioclavicular osteoarthrosis with capsular hypertrophy.      IMPRESSION:  Chronic  atraumatic right shoulder pain; chronic right-sided neck pain/limited motion; radicular pain/paresthesias of the right upper extremity and no specific dermatomal distribution  MRI imaging of the right shoulder noting possible small posterior glenoid labral tear  but clinical exam does not seem consistent with this condition.  Differential includes adhesive capsulitis versus radicular pain from her cervical spine, muscle spasming  Limited relief of motion of her shoulder with formal physical therapy but no improvement of her pain intensity    Other factors:  Patient has had similar pain in the past and there was a noted MRI from 2013 noting rotator cuff tendinosis without tear.  Patient states she did undergo an arthroscopic procedure of her shoulder but I do not have access to records to note what was performed.  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 I have discussed with the patient the pathophysiology of this diagnosis and reviewed how the examination correlates with this diagnosis.    MRI imaging of her right shoulder reviewed today as noted above.  I counseled that her clinical exam does not seem consistent with a posterior glenoid tear alone and counseled that adhesive capsulitis can also demonstrate an unremarkable MRI despite causing significant range of motion stiffness and issues.  Furthermore, I I discussed a differential of her pain could be cervical radicular pain given her significantly limited range of motion movement of her shoulder as well as her reported paresthesias and shooting pain in her right upper extremity.  I ordered radiographs of her cervical spine today but as imaging services are done in office I recommended that she obtain imaging from the hospital or to come back on a separate day to obtain imaging or she can go to an urgent care.  Going forward, I counseled pain control through prescription of gabapentin 300 mg p.o. 3 times daily.  I counseled tapering up from nightly to 3 times daily every 3 to 4 days as tolerated.  I counseled  "sedative/grogginess effects of gabapentin and to avoid operate heavy machinery if this were to occur.  In addition I have also prescribed her a muscle relaxer of Robaxin 500 mg p.o. twice daily.  Counseled she can continue use of NSAIDs, acetaminophen while on these medications.  Furthermore, I did offer a repeat cortisone injection of her right shoulder, specifically of her glenohumeral joint line where there is a suspected glenoid labral injury.  I counseled that labral tears with present do not typically need to undergo surgical intervention unless there is no improvement with conservative treatments of therapy over time.  I did offer a referral to orthopedic surgery for second opinion however but patient deferred at this time and is more willing to try the oral medication changes as per above as well as a repeat cortisone injection (as per procedure note below).  Recommended to continue formal physical therapy at this time with [facility].    Return in about 3 weeks (around 4/10/2024), or if symptoms worsen or fail to improve.  If there is no improvement or progressive worsening of pain, may consider obtaining an MRI of her cervical spine without contrast as a neck step as well as as a referral to pain management.    Portions of the record may have been created with voice recognition software. Occasional wrong word or \"sound a like\" substitutions may have occurred due to the inherent limitations of voice recognition software. Read the chart carefully and recognize, using context, where substitutions have occurred.     CHIEF COMPLAINT:  Chief Complaint   Patient presents with    Right Shoulder - Follow-up    Follow-up       HPI:  Ayala Ferreira is a 48 y.o. female  who presents for     Visit 3/20/2024:  Follow up evaluation of right shoulder pain:  MRI of the right shoulder was obtained since last visit.  This was reviewed as noted above.  Patient reports attending formal physical therapy through [facility].  However she still feels no improvement of her pain or function of her right shoulder.  She states she still has difficulty with even reaching above shoulder height or behind her back.  She states she experiences muscle spasms, a sense that her shoulder is \"unstable.\"  She reports difficulty with sleeping at night especially when lying on her right shoulder without exacerbating the pain.  She also now feels a sense of increased stiffness and pain of the right side of her neck as well as a numbness/burning sensation down her right upper extremity into her hand.  Reports use of oral NSAIDs which only provides minimal relief of her pain.  She has undergone subacromial cortisone injection of her right shoulder in the past with some relief as well but only for a brief period of time.  She still feels that the pain is of severe intensity and is significantly limited her use of her arm as well as her ability to sleep at night.      Medical, Surgical, Family, and Social History    Past Medical History:   Diagnosis Date    Abnormal uterine bleeding     Cervical polyp     Chronic diarrhea     Hyperlipidemia     Hypertension     Migraine     Pt was started on Effexor for prevention. Pt states it has helped    PONV (postoperative nausea and vomiting)     Pt reports having times one in     Post-operative nausea and vomiting     vomited x3 after procedure 8/3/22    Prediabetes     Wears contact lenses      Past Surgical History:   Procedure Laterality Date    ADRENAL GLAND SURGERY  2023     SECTION      COLONOSCOPY      FRACTURE SURGERY Right     Pt had a finger articular surgery for repair middle finger.    HYSTERECTOMY      HYSTERECTOMY  2022    HYSTEROSCOPY N/A 2021    Procedure: HYSTEROSCOPY, D&C, POLYPECTOMY;  Surgeon: Jamaal Schuster Jr., DO;  Location:  MAIN OR;  Service: Gynecology    PA LAPAROSCOPY ADRENALECTOMY PRTL/COMPL TABDL Left 2023    Procedure: LEFT ADRENALECTOMY " "LAPAROSCOPIC W/ ROBOTICS;  Surgeon: Francisco Javier Preciado MD;  [facility]: [facility];  Service: Surgical Oncology    IA LAPS FULG/EXC OVARY VISCERA/PERITONEAL SURFACE N/A 11/10/2022    Procedure: ROBOT BILATERAL OOPHORECTOMY, STAGING PERITONEAL BIOPSIES, EXCISION OF NODULAR/ABNORMAL APPEARING PERITONEAL AREAS, INFRACOLIC OMENTECTOMY;  Surgeon: Rao Adan MD;  Location: [facility];  Service: Gynecology Oncology    IA LAPS TOTAL HYSTERECT 250 GM/< W/RMVL TUBE/OVARY N/A 08/03/2022    Procedure: DIAGNOSTIC LAPAROSCOPY, ROBOTIC LAPAROSCOPIC HYSTERECTOMY, BILATERAL SALPINGECTOMY;  Surgeon: Jamaal Schuster Jr. DO;  Location: [facility];  Service: Gynecology    SHOULDER ARTHROSCOPY Right     SHOULDER SURGERY  8/2013    Impingement/debridement/bursa sack removal     Social History   Social History     Substance and Sexual Activity   Alcohol Use Yes    Alcohol/week: 3.0 standard drinks of alcohol    Types: 3 Cans of beer per week    Comment: occasionally     Social History     Substance and Sexual Activity   Drug Use Never     Social History     Tobacco Use   Smoking Status Never   Smokeless Tobacco Never     Family History   Problem Relation Age of Onset    Heart disease Mother     COPD Mother     Diabetes Mother     Stroke Father     Diabetes Father     Leukemia Maternal Grandfather     Cancer Maternal Grandfather         Leukemia    Ovarian cancer Cousin      Allergies   Allergen Reactions    Pneumococcal Vaccines Anaphylaxis     Pt states she hallucinated.     Latex Hives          Physical Exam  /85   Pulse 73   Temp 97.8 °F (36.6 °C)   Ht 5' 2\" (1.575 m)   Wt 69.9 kg (154 lb)   LMP 07/05/2022 (Exact Date)   BMI 28.17 kg/m²     Constitutional:  see vital signs  Gen: well-developed, normocephalic/atraumatic, well-groomed  Eyes: No inflammation or discharge of conjunctiva or lids; sclera clear   Pulmonary/Chest: Effort normal. No respiratory distress.     Ortho Exam  Shoulder exam:       RIGHT  "   Inspection Erythema None     Swelling None     Increased Warmth None    Rotator cuff ER 5/5     IR 5/5     Abduction 5-/5    ROM      Abduction 140     ER0 60     IRb T7    TTP:  + Generalized throughout shoulder, hyperalgesic reaction    Special Tests: O'Briens  (FF 90, add 10, resist thumbs up-, resist thumbs down+) Negative slap    Crank  (Abd 90, axial load, rotate) Negative slap    Cross body Adduction Negative     Speeds  Negative     Yergason's Negative     Drop arm Negative     Neer Positive     Correa Positive      Cervical  ROM: Limited.  Neck flexion of about 45 degrees.  Neck extension of 0-5 degrees actively.  I can passively extend her neck to about 10 degrees further but patient reports significant stiffness of her paracervical neck with this motion.  Lateral flexion of about 5 degrees to the right and 20 degrees to the left.  Lateral rotation to the right of about 5 to 10 degrees and to the left of about 30 degrees  Midline spinous process tenderness: None  Muscular Tenderness: + Right paracervical  Sensation UE Bilateral:  C5: normal  C6: normal  C7: normal  C8: normal  T1: normal  Strength UE: 5/5 elbow, wrist, fingers bilateral  Spurlings: Negative bilaterally               Large joint arthrocentesis: R glenohumeral  [facility]ie Protocol:  Consent: Verbal consent obtained.  Risks and benefits: risks, benefits and alternatives were discussed  Consent given by: patient  Patient understanding: patient states understanding of the procedure being performed  Site marked: the operative site was marked  Radiology Images displayed and confirmed. If images not available, report reviewed: imaging studies available  Patient identity confirmed: verbally with patient  Supporting Documentation  Indications: pain   Procedure Details  Location: shoulder - R glenohumeral  Preparation: Patient was prepped and draped in the usual sterile fashion  Needle size: 22 G  Ultrasound guidance: no  Approach:  posterior  Medications administered: 40 mg triamcinolone acetonide 40 mg/mL; 2 mL bupivacaine 0.25 %    Patient tolerance: patient tolerated the procedure well with no immediate complications  Dressing:  Sterile dressing applied

## 2024-04-10 ENCOUNTER — OFFICE VISIT (OUTPATIENT)
Dept: OBGYN CLINIC | Facility: CLINIC | Age: 49
End: 2024-04-10
Payer: COMMERCIAL

## 2024-04-10 VITALS
WEIGHT: 154 LBS | SYSTOLIC BLOOD PRESSURE: 118 MMHG | HEIGHT: 62 IN | BODY MASS INDEX: 28.34 KG/M2 | HEART RATE: 67 BPM | TEMPERATURE: 97.8 F | DIASTOLIC BLOOD PRESSURE: 72 MMHG

## 2024-04-10 DIAGNOSIS — M79.2 RADICULAR PAIN IN RIGHT ARM: ICD-10-CM

## 2024-04-10 DIAGNOSIS — M54.2 CHRONIC NECK PAIN: ICD-10-CM

## 2024-04-10 DIAGNOSIS — M25.511 CHRONIC RIGHT SHOULDER PAIN: Primary | ICD-10-CM

## 2024-04-10 DIAGNOSIS — G89.29 CHRONIC NECK PAIN: ICD-10-CM

## 2024-04-10 DIAGNOSIS — M43.6 NECK STIFFNESS: ICD-10-CM

## 2024-04-10 DIAGNOSIS — M75.81 RIGHT ROTATOR CUFF TENDONITIS: ICD-10-CM

## 2024-04-10 DIAGNOSIS — G89.29 CHRONIC RIGHT SHOULDER PAIN: Primary | ICD-10-CM

## 2024-04-10 DIAGNOSIS — M25.611 STIFFNESS OF RIGHT SHOULDER JOINT: ICD-10-CM

## 2024-04-10 PROCEDURE — 99213 OFFICE O/P EST LOW 20 MIN: CPT | Performed by: STUDENT IN AN ORGANIZED HEALTH CARE EDUCATION/TRAINING PROGRAM

## 2024-04-11 NOTE — PROGRESS NOTES
1. Chronic right shoulder pain        2. Neck stiffness        3. Chronic neck pain        4. Radicular pain in right arm        5. Stiffness of right shoulder joint        6. Right rotator cuff tendonitis            No orders of the defined types were placed in this encounter.       Imaging Studies (I personally reviewed images in PACS and report):    X-ray cervical spine 3/20/2024: No acute osseous abnormalities.  Straightening of the usual cervical lordosis.  Intervertebral disc heights preserved.    MRI right shoulder without contrast 2/2/2024: Noted to have a small posterior superior glenoid labral tear.  Otherwise intact rotator cuff.  Small enchondroma of the humeral head.    X-ray right shoulder 12/6/2023: No acute osseous abnormalities.  Mild AC arthritis.    MRI right shoulder without contrast 7/31/2013: [facility].  Only report is available noting  1.  Rotator cuff tendinosis.  No definite tear identified.   2. Nonaggressive lesions within the proximal humerus as above.  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  There is a 0.6 cm cartilaginous rest versus enchondroma noted within the medial aspect of the proximal humeral metaphysis)  3. Mild acromioclavicular osteoarthrosis with capsular hypertrophy.      IMPRESSION:  Chronic  atraumatic right shoulder pain; chronic right-sided neck pain/limited motion; radicular pain/paresthesias of the right upper extremity and no specific dermatomal distribution  MRI imaging of the right shoulder noting possible small posterior glenoid labral tear but clinical exam does not seem consistent with this condition.  Differential includes adhesive capsulitis versus radicular pain from her cervical spine, muscle spasming  Limited relief of motion of her shoulder with formal physical therapy but no improvement of her pain intensity  Improvement/resolution of pain since last visit s/p glenohumeral  "CSI; additionally patient taking gabapentin 300mg TID, robaxin 500mg PO BID    Other factors:  Patient has had similar pain in the past and there was a noted MRI from 2013 noting rotator cuff tendinosis without tear.  Patient states she did undergo an arthroscopic procedure of her shoulder but I do not have access to records to note what was performed.  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 I have discussed with the patient the pathophysiology of this diagnosis and reviewed how the examination correlates with this diagnosis.    Prior radiographs of her cervical spine reviewed as noted above  Reassured patient of her progressive improvement since last visit.  Counseled she can continue her gabapentin 3 times daily, Robaxin twice daily for now but to progressively attempted transition of this medication over the next few weeks if possible.  Continue formal PT/HEP going forward  Patient prefers to continue following up for symptoms which is reasonable.  I will make a follow-up in 6 weeks and we can determine whether patient has been able to transition off medications or if we will need to continue pain medications going forward.    Return in about 6 weeks (around 5/22/2024).     Portions of the record may have been created with voice recognition software. Occasional wrong word or \"sound a like\" substitutions may have occurred due to the inherent limitations of voice recognition software. Read the chart carefully and recognize, using context, where substitutions have occurred.     CHIEF COMPLAINT:  Chief Complaint   Patient presents with    Follow-up       HPI:  Ayala Ferreira is a 48 y.o. female  who presents with her significant other for     Visit 4/10/2024:  Follow-up evaluation right shoulder pain  On " last visit, 3/20/2024, patient was given a palpation guided glenohumeral joint cortisone injection.  In addition she was prescribed gabapentin as well as Robaxin.  Patient states she was able to gradually taper onto these medications and states she is currently taking gabapentin 3 times a day and Robaxin twice a day.  She states today that her pain is actually completely resolved since last visit.  She feels she still has full range of motion of her shoulder and intact strength.  Denies any shoulder swelling, discoloration, crepitus.  Denies any N/T of her right upper extremity.  She is satisfied with her progress thus far and is wondering how long she would need to stay on these pain medications.    Patient was also able to obtain radiographs of her cervical spine since last visit.    Medical, Surgical, Family, and Social History    Past Medical History:   Diagnosis Date    Abnormal uterine bleeding     Cervical polyp     Chronic diarrhea     Hyperlipidemia     Hypertension     Migraine     Pt was started on Effexor for prevention. Pt states it has helped    PONV (postoperative nausea and vomiting)     Pt reports having times one in     Post-operative nausea and vomiting     vomited x3 after procedure 8/3/22    Prediabetes     Wears contact lenses      Past Surgical History:   Procedure Laterality Date    ADRENAL GLAND SURGERY  2023     SECTION      COLONOSCOPY      FRACTURE SURGERY Right     Pt had a finger articular surgery for repair middle finger.    HYSTERECTOMY      HYSTERECTOMY  2022    HYSTEROSCOPY N/A 2021    Procedure: HYSTEROSCOPY, D&C, POLYPECTOMY;  Surgeon: Jamaal Schuster Jr., DO;  Location: [facility];  Service: Gynecology    OH LAPAROSCOPY ADRENALECTOMY PRTL/COMPL TABDL Left 2023    Procedure: LEFT ADRENALECTOMY LAPAROSCOPIC W/ ROBOTICS;  Surgeon: Francisco Javier Preciado MD;  Location: [facility];  Service: Surgical Oncology    OH LAPS FULG/EXC OVARY VISCERA/PERITONEAL  "SURFACE N/A 11/10/2022    Procedure: ROBOT BILATERAL OOPHORECTOMY, STAGING PERITONEAL BIOPSIES, EXCISION OF NODULAR/ABNORMAL APPEARING PERITONEAL AREAS, INFRACOLIC OMENTECTOMY;  Surgeon: Rao Adan MD;  Location: [facility];  Service: Gynecology Oncology    WA LAPS TOTAL HYSTERECT 250 GM/< W/RMVL TUBE/OVARY N/A 08/03/2022    Procedure: DIAGNOSTIC LAPAROSCOPY, ROBOTIC LAPAROSCOPIC HYSTERECTOMY, BILATERAL SALPINGECTOMY;  Surgeon: Jamaal Schuster Jr., DO;  Location:  MAIN OR;  Service: Gynecology    SHOULDER ARTHROSCOPY Right     SHOULDER SURGERY  8/2013    Impingement/debridement/bursa sack removal     Social History   Social History     Substance and Sexual Activity   Alcohol Use Yes    Alcohol/week: 3.0 standard drinks of alcohol    Types: 3 Cans of beer per week    Comment: occasionally     Social History     Substance and Sexual Activity   Drug Use Never     Social History     Tobacco Use   Smoking Status Never   Smokeless Tobacco Never     Family History   Problem Relation Age of Onset    Heart disease Mother     COPD Mother     Diabetes Mother     Stroke Father     Diabetes Father     Leukemia Maternal Grandfather     Cancer Maternal Grandfather         Leukemia    Ovarian cancer Cousin      Allergies   Allergen Reactions    Pneumococcal Vaccines Anaphylaxis     Pt states she hallucinated.     Latex Hives          Physical Exam  /72   Pulse 67   Temp 97.8 °F (36.6 °C) (Temporal)   Ht 5' 2\" (1.575 m)   Wt 69.9 kg (154 lb)   LMP 07/05/2022 (Exact Date)   BMI 28.17 kg/m²     Constitutional:  see vital signs  Gen: well-developed, normocephalic/atraumatic, well-groomed  Eyes: No inflammation or discharge of conjunctiva or lids; sclera clear   Pulmonary/Chest: Effort normal. No respiratory distress.     Ortho Exam  Shoulder exam:       RIGHT    Inspection Erythema None     Swelling None     Increased Warmth None    Rotator cuff ER 5/5     IR 5/5     Abduction 5-/5    ROM      Abduction " 170     ER0 60     IRb T7    TTP:  Denies tenderness throughout shoulder    Special Tests: O'Briens  (FF 90, add 10, resist thumbs up-, resist thumbs down+) Negative slap    Cross body Adduction Negative     Speeds  Negative     Yergason's Negative     Drop arm Negative     Neer Negative     Correa Positive      Cervical  ROM: Intact in all planes of motion and denies sense of aggravating neck pain or stiffness  Midline spinous process tenderness: None  Muscular Tenderness: + Right paracervical  Sensation UE Bilateral:  C5: normal  C6: normal  C7: normal  C8: normal  T1: normal  Strength UE: 5/5 elbow, wrist, fingers bilateral  Spurlings: Negative bilaterally               Procedures

## 2024-06-11 ENCOUNTER — OFFICE VISIT (OUTPATIENT)
Dept: GYNECOLOGIC ONCOLOGY | Facility: CLINIC | Age: 49
End: 2024-06-11
Payer: COMMERCIAL

## 2024-06-11 ENCOUNTER — OFFICE VISIT (OUTPATIENT)
Dept: FAMILY MEDICINE CLINIC | Facility: CLINIC | Age: 49
End: 2024-06-11
Payer: COMMERCIAL

## 2024-06-11 VITALS
HEIGHT: 61 IN | RESPIRATION RATE: 16 BRPM | BODY MASS INDEX: 29.68 KG/M2 | TEMPERATURE: 97.9 F | WEIGHT: 157.2 LBS | SYSTOLIC BLOOD PRESSURE: 124 MMHG | OXYGEN SATURATION: 96 % | DIASTOLIC BLOOD PRESSURE: 80 MMHG | HEART RATE: 77 BPM

## 2024-06-11 VITALS
OXYGEN SATURATION: 98 % | HEIGHT: 61 IN | SYSTOLIC BLOOD PRESSURE: 132 MMHG | TEMPERATURE: 97.2 F | BODY MASS INDEX: 29.64 KG/M2 | HEART RATE: 67 BPM | DIASTOLIC BLOOD PRESSURE: 84 MMHG | WEIGHT: 157 LBS

## 2024-06-11 DIAGNOSIS — E89.6 HISTORY OF TOTAL ADRENALECTOMY (HCC): ICD-10-CM

## 2024-06-11 DIAGNOSIS — E87.6 HYPOKALEMIA: ICD-10-CM

## 2024-06-11 DIAGNOSIS — E89.41 SYMPTOMATIC POSTSURGICAL MENOPAUSE: ICD-10-CM

## 2024-06-11 DIAGNOSIS — Z12.31 ENCOUNTER FOR SCREENING MAMMOGRAM FOR BREAST CANCER: ICD-10-CM

## 2024-06-11 DIAGNOSIS — Z12.11 SCREEN FOR COLON CANCER: ICD-10-CM

## 2024-06-11 DIAGNOSIS — D39.10 SEROUS SURFACE PAPILLARY TUMOR WITH BORDERLINE MALIGNANT FEATURES: ICD-10-CM

## 2024-06-11 DIAGNOSIS — Z00.00 ANNUAL PHYSICAL EXAM: Primary | ICD-10-CM

## 2024-06-11 DIAGNOSIS — E78.2 MIXED HYPERLIPIDEMIA: ICD-10-CM

## 2024-06-11 DIAGNOSIS — D39.10 SEROUS SURFACE PAPILLARY TUMOR WITH BORDERLINE MALIGNANT FEATURES: Primary | ICD-10-CM

## 2024-06-11 DIAGNOSIS — I10 PRIMARY HYPERTENSION: ICD-10-CM

## 2024-06-11 DIAGNOSIS — R73.03 PREDIABETES: ICD-10-CM

## 2024-06-11 PROCEDURE — 99213 OFFICE O/P EST LOW 20 MIN: CPT

## 2024-06-11 PROCEDURE — 99396 PREV VISIT EST AGE 40-64: CPT

## 2024-06-11 PROCEDURE — 99214 OFFICE O/P EST MOD 30 MIN: CPT | Performed by: OBSTETRICS & GYNECOLOGY

## 2024-06-11 RX ORDER — ATENOLOL 25 MG/1
25 TABLET ORAL DAILY
Qty: 90 TABLET | Refills: 3 | Status: SHIPPED | OUTPATIENT
Start: 2024-06-11 | End: 2024-06-12

## 2024-06-11 RX ORDER — AMLODIPINE BESYLATE 10 MG/1
10 TABLET ORAL DAILY
Qty: 90 TABLET | Refills: 3 | Status: SHIPPED | OUTPATIENT
Start: 2024-06-11

## 2024-06-11 RX ORDER — HYDROCHLOROTHIAZIDE 12.5 MG/1
12.5 TABLET ORAL DAILY
Qty: 90 TABLET | Refills: 3 | Status: SHIPPED | OUTPATIENT
Start: 2024-06-11

## 2024-06-11 RX ORDER — FENOFIBRATE 145 MG/1
145 TABLET, COATED ORAL DAILY
Qty: 90 TABLET | Refills: 3 | Status: SHIPPED | OUTPATIENT
Start: 2024-06-11

## 2024-06-11 RX ORDER — LOSARTAN POTASSIUM 100 MG/1
100 TABLET ORAL DAILY
Qty: 90 TABLET | Refills: 3 | Status: SHIPPED | OUTPATIENT
Start: 2024-06-11

## 2024-06-11 RX ORDER — ESTRADIOL 0.04 MG/D
1 PATCH TRANSDERMAL WEEKLY
Qty: 4 PATCH | Refills: 5 | Status: SHIPPED | OUTPATIENT
Start: 2024-06-11

## 2024-06-11 NOTE — ASSESSMENT & PLAN NOTE
Will continue to monitor A1c and fasting blood sugar.  Educated on healthy diet and adequate activity.

## 2024-06-11 NOTE — ASSESSMENT & PLAN NOTE
Patient is having bothersome hot flashes, mood swings, etc. Patient was counseled but risks including but not limited to reported increased statistical risk of cancer, venous thromboembolism, etc. After discussing pros and cons as well as side effect profile of transdermal estrogen replacement therapy, she has agreed to proceed.  I have sent mid dose patches to pharmacy.  She will contact us if hot flashes are not completely resolved so dose could be increased accordingly.

## 2024-06-11 NOTE — PROGRESS NOTES
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  I reviewed previsit  and ordered  prior to next visit.    Patient has no evidence of disease.  She will return to the office in 6 months with previsit .  She will contact us if she has any new symptoms.         Relevant Orders           Surgery/Wound/Pain    Symptomatic postsurgical menopause     Patient is having bothersome hot flashes, mood swings, etc. Patient was counseled but risks including but not limited to reported increased statistical risk of cancer, venous thromboembolism, etc. After discussing pros and cons as well as side effect profile of transdermal estrogen replacement therapy, she has agreed to proceed.  I have sent mid dose patches to pharmacy.  She will contact us if hot flashes are not completely resolved so dose could be increased accordingly.             Relevant Medications    estradiol (Climara) 0.0375 MG/24HR         CHIEF COMPLAINT:   Follow-up for borderline serous ovarian tumor, complains of persistent hot flashes/mood swings.    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 female  [facility]  Patient with likely stage IA serous borderline tumor of the ovary diagnosed in December 2022.  Presents for follow-up.  Complains of bothersome hot flashes and mood swings.  Denies vaginal bleeding, drainage or discharge.  Reports normal bowel and bladder function.  Denies pelvic or abdominal pain.  ECOG performance that is 0.  The following portions of the patient's history were reviewed and updated as appropriate: allergies, current medications, past family history, past medical history, past social history, past surgical history, and problem list.    Review of Systems  Per [facility].  Current Outpatient Medications   Medication Sig Dispense Refill    amLODIPine (NORVASC) 10 mg tablet Take 1 tablet (10 mg total) by mouth daily 90 tablet 3    atenolol (TENORMIN) 25 mg tablet Take 1 tablet (25 mg total) by mouth daily (Patient taking differently: Take 25 mg by mouth 2 (two) times a day) 90 tablet 3    Calcium-Magnesium-Vitamin D (CALCIUM 1200+D3 PO) Take 1 tablet by mouth daily      estradiol (Climara) 0.0375 MG/24HR Place 1 patch on the skin over 7 days once a week 4 patch 5    fenofibrate (TRICOR) 145 mg tablet Take 1 tablet (145 mg total) by mouth daily 90 tablet 3    fluticasone (FLONASE) 50 mcg/act nasal spray 1 spray into each nostril daily      hydroCHLOROthiazide 12.5 mg tablet Take 1 tablet (12.5 mg total) by mouth daily 90 tablet 3    losartan (COZAAR) 100 MG tablet Take 1 tablet (100 mg total) by mouth daily 90 tablet 3    Multiple Vitamins-Minerals (MULTIVITAMIN WOMEN PO) Take 1 tablet by mouth daily      topiramate (TOPAMAX) 50 MG tablet Take 50 mg by mouth every 12 (twelve) hours       No current facility-administered medications for this visit.           Objective:    Blood pressure 124/80, pulse 77, temperature 97.9 °F (36.6 °C), temperature source Temporal, resp. rate 16, height 5' 1\" (1.549 " m), weight 71.3 kg (157 lb 3.2 oz), last menstrual period 07/05/2022, SpO2 96%.  Body mass index is 29.7 kg/m².  Body surface area is 1.71 meters squared.    Physical Exam  Vitals reviewed. Exam conducted with a chaperone present.   Pulmonary:      Effort: Pulmonary effort is normal.   Abdominal:      General: There is no distension.      Palpations: Abdomen is soft. There is no mass.      Tenderness: There is no abdominal tenderness. There is no guarding.      Hernia: No hernia is present.   Genitourinary:     Comments: Normal external female genitalia. Normal Bartholin's and Clara's glands. Normal urethral meatus and no evidence of urethral discharge or masses.  Bladder without fullness mass or tenderness. Vagina without lesion or discharge. No significant pelvic organ prolapse noted.  Cervix and uterus are surgically absent.  Bimanual exam demonstrates no evidence of pelvic masses.  Anus without fissure of lesion.    Musculoskeletal:      Right lower leg: No edema.      Left lower leg: No edema.          Trend:  Lab Results   Component Value Date     2.6 11/27/2023     8.2 08/17/2022     Component  Ref Range & Units 6/1/24  8:13 AM     <=38.1 U/mL 3.4     Specimen Collected: 06/01/24  8:13 AM    Performed by: [facility] Resulted: 06/01/24 12:26 PM   Received From: [facility]  Result Received: 06/06/24 10:06 AM       Rao Adan MD, FACOG, FACS  6/11/2024  4:09 PM

## 2024-06-11 NOTE — ASSESSMENT & PLAN NOTE
I have independently obtained history from patient.  I reviewed previsit  and ordered  prior to next visit.    Patient has no evidence of disease.  She will return to the office in 6 months with previsit .  She will contact us if she has any new symptoms.

## 2024-06-11 NOTE — ASSESSMENT & PLAN NOTE
At goal today.  Continue Tenormin, amlodipine, losartan, HCTZ.   patient is to take at home blood pressures, and is to contact office if persistently greater than 140/90.  If so, we will increase HCTZ.  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  Has been stable since.  It was also the cause of her hypokalemia.

## 2024-06-11 NOTE — PROGRESS NOTES
Adult Annual Physical  Name: Ayala Ferreira      : 1975      MRN: [id number]  Encounter Provider: Brett Garces PA-C  Encounter Date: 2024   Encounter department: [facility]    Assessment & Plan   1. Annual physical exam  2. Primary hypertension  Assessment & Plan:  At goal today.  Continue Tenormin, amlodipine, losartan, HCTZ.   patient is to take at home blood pressures, and is to contact office if persistently greater than 140/90.  If so, we will increase HCTZ.  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  Has been stable since.  It was also the cause of her hypokalemia.  Orders:  -     amLODIPine (NORVASC) 10 mg tablet; Take 1 tablet (10 mg total) by mouth daily  -     hydroCHLOROthiazide 12.5 mg tablet; Take 1 tablet (12.5 mg total) by mouth daily  -     atenolol (TENORMIN) 25 mg tablet; Take 1 tablet (25 mg total) by mouth daily  -     losartan (COZAAR) 100 MG tablet; Take 1 tablet (100 mg total) by mouth daily  3. Mixed hyperlipidemia  Assessment & Plan:  Continue fenofibric.  Will follow-up pending results.  Educated on healthy diet and adequate activity.  Orders:  -     Lipid panel; Future  -     fenofibrate (TRICOR) 145 mg tablet; Take 1 tablet (145 mg total) by mouth daily  4. Prediabetes  Assessment & Plan:  Will continue to monitor A1c and fasting blood sugar.  Educated on healthy diet and adequate activity.  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  Continue this follow-up.  6. History of total adrenalectomy (HCC)  Assessment & Plan:  Due to resistant hypertension and hypokalemia persistence.  Has been stable since.  7. Encounter for screening mammogram for breast cancer  -     Mammo screening bilateral w 3d & cad; Future  8. Screen for colon  cancer  -     Cologuard  9. Hypokalemia  Assessment & Plan:  Has been stable since adrenalectomy.  Will continue to monitor this.    Educated patient on taper of topiramate.  25 mg a week.  Educated on signs of complication, and is to contact office if these occur.    Immunizations and preventive care screenings were discussed with patient today. Appropriate education was printed on patient's after visit summary.    Counseling:  Alcohol/drug use: discussed moderation in alcohol intake, the recommendations for healthy alcohol use, and avoidance of illicit drug use.  Dental Health: discussed importance of regular tooth brushing, flossing, and dental visits.  Injury prevention: discussed safety/seat belts, safety helmets, smoke detectors, carbon dioxide detectors, and smoking near bedding or upholstery.  Sexual health: discussed sexually transmitted diseases, partner selection, use of condoms, avoidance of unintended pregnancy, and contraceptive alternatives.  Exercise: the importance of regular exercise/physical activity was discussed. Recommend exercise 3-5 times per week for at least 30 minutes.          History of Present Illness     Adult Annual Physical:  Patient presents for annual physical. Patient is a 48-year-old female presenting for annual physical.  Patient would like to stop topiramate.  Patient has a history of migraines, but these have ceased since she started menopause.  Patient also was inquiring about possibly increasing HCTZ for blood pressure.  Patient has no other concerns today..     Diet and Physical Activity:  - Diet/Nutrition: well balanced diet and limited junk food.  - Exercise: no formal exercise.    Depression Screening:  - PHQ-2 Score: 0    General Health:  - Sleep: sleeps well and 7-8 hours of sleep on average.  - Hearing: normal hearing bilateral ears.  - Vision: no vision problems and wears glasses.  - Dental: regular dental visits.    /GYN Health:  - Follows with GYN: yes.   -  Menopause: postmenopausal.   - History of STDs: no    Review of Systems   Constitutional:  Negative for appetite change, chills, diaphoresis, fatigue and fever.   HENT:  Negative for congestion, ear discharge, ear pain, postnasal drip, rhinorrhea, sinus pressure, sinus pain, sneezing and sore throat.    Eyes:  Negative for pain, discharge, redness, itching and visual disturbance.   Respiratory:  Negative for apnea, cough, chest tightness, shortness of breath and wheezing.    Cardiovascular:  Negative for chest pain, palpitations and leg swelling.   Gastrointestinal:  Negative for abdominal pain, blood in stool, constipation, diarrhea, nausea and vomiting.   Endocrine: Negative for cold intolerance, heat intolerance, polydipsia and polyuria.   Genitourinary:  Negative for dysuria, flank pain, frequency, hematuria and urgency.   Musculoskeletal:  Negative for arthralgias, back pain, myalgias, neck pain and neck stiffness.   Skin:  Negative for color change and rash.   Allergic/Immunologic: Negative for environmental allergies and food allergies.   Neurological:  Negative for dizziness, tremors, seizures, syncope, speech difficulty, weakness, light-headedness, numbness and headaches.   Hematological:  Negative for adenopathy. Does not bruise/bleed easily.   Psychiatric/Behavioral:  Negative for agitation, confusion, decreased concentration, dysphoric mood, hallucinations, self-injury, sleep disturbance and suicidal ideas. The patient is not nervous/anxious and is not hyperactive.    All other systems reviewed and are negative.    Medical History Reviewed by provider this encounter:       Current Outpatient Medications on File Prior to Visit   Medication Sig Dispense Refill    Calcium-Magnesium-Vitamin D (CALCIUM 1200+D3 PO) Take 1 tablet by mouth daily      fluticasone (FLONASE) 50 mcg/act nasal spray 1 spray into each nostril daily      Multiple Vitamins-Minerals (MULTIVITAMIN WOMEN PO) Take 1 tablet by mouth daily    "   topiramate (TOPAMAX) 50 MG tablet Take 50 mg by mouth every 12 (twelve) hours      [DISCONTINUED] amLODIPine (NORVASC) 10 mg tablet Take 10 mg by mouth daily      [DISCONTINUED] atenolol (TENORMIN) 50 mg tablet       [DISCONTINUED] fenofibrate (TRICOR) 145 mg tablet Take 145 mg by mouth daily      [DISCONTINUED] hydrochlorothiazide (HYDRODIURIL) 12.5 mg tablet       [DISCONTINUED] losartan (COZAAR) 100 MG tablet       [DISCONTINUED] gabapentin (NEURONTIN) 300 mg capsule Take 1 capsule (300 mg total) by mouth 3 (three) times a day 90 capsule 1    [DISCONTINUED] methocarbamol (ROBAXIN) 500 mg tablet Take 1 tablet (500 mg total) by mouth 2 (two) times a day 60 tablet 1    [DISCONTINUED] SUMAtriptan (IMITREX) 5 MG/ACT nasal spray 1 spray into each nostril every 2 (two) hours as needed for migraine (Patient not taking: Reported on 6/11/2024)      [DISCONTINUED] valACYclovir (VALTREX) 1,000 mg tablet Take 1,000 mg by mouth as needed (Patient not taking: Reported on 6/11/2024)       No current facility-administered medications on file prior to visit.      Social History     Tobacco Use    Smoking status: Never    Smokeless tobacco: Never   Vaping Use    Vaping status: Never Used   Substance and Sexual Activity    Alcohol use: Yes     Alcohol/week: 3.0 standard drinks of alcohol     Types: 3 Cans of beer per week     Comment: occasionally    Drug use: Never    Sexual activity: Yes     Partners: Male     Birth control/protection: Male Sterilization     Comment: did not ask       Objective     /84 (BP Location: Right arm, Patient Position: Sitting)   Pulse 67   Temp (!) 97.2 °F (36.2 °C) (Tympanic)   Ht 5' 1\" (1.549 m)   Wt 71.2 kg (157 lb)   LMP 07/05/2022 (Exact Date)   SpO2 98%   BMI 29.66 kg/m²     Physical Exam  Vitals and nursing note reviewed.   Constitutional:       General: She is not in acute distress.     Appearance: Normal appearance. She is well-developed and normal weight. She is not " ill-appearing, toxic-appearing or diaphoretic.   HENT:      Head: Normocephalic and atraumatic.      Right Ear: Tympanic membrane normal.      Left Ear: Tympanic membrane normal.      Nose: Nose normal.      Mouth/Throat:      Mouth: Mucous membranes are moist.      Pharynx: Oropharynx is clear.   Eyes:      Conjunctiva/sclera: Conjunctivae normal.      Pupils: Pupils are equal, round, and reactive to light.   Cardiovascular:      Rate and Rhythm: Normal rate and regular rhythm.      Pulses: Normal pulses.      Heart sounds: Normal heart sounds. No murmur heard.  Pulmonary:      Effort: Pulmonary effort is normal. No respiratory distress.      Breath sounds: Normal breath sounds. No wheezing.   Chest:      Chest wall: No tenderness.   Abdominal:      General: Bowel sounds are normal.      Palpations: Abdomen is soft. There is no mass.      Tenderness: There is no abdominal tenderness.   Musculoskeletal:         General: No swelling or tenderness.      Cervical back: Normal range of motion and neck supple. No tenderness.      Right lower leg: No edema.      Left lower leg: No edema.   Lymphadenopathy:      Cervical: No cervical adenopathy.   Skin:     General: Skin is warm and dry.      Capillary Refill: Capillary refill takes less than 2 seconds.      Findings: No erythema, lesion or rash.   Neurological:      General: No focal deficit present.      Mental Status: She is alert and oriented to person, place, and time. Mental status is at baseline.      Motor: No weakness.      Coordination: Coordination normal.      Gait: Gait normal.   Psychiatric:         Mood and Affect: Mood normal.         Behavior: Behavior normal.         Thought Content: Thought content normal.         Judgment: Judgment normal.       Administrative Statements

## 2024-06-11 NOTE — ASSESSMENT & PLAN NOTE
Continue fenofibric.  Will follow-up pending results.  Educated on healthy diet and adequate activity.

## 2024-06-12 DIAGNOSIS — I10 PRIMARY HYPERTENSION: ICD-10-CM

## 2024-06-12 RX ORDER — ATENOLOL 25 MG/1
25 TABLET ORAL 2 TIMES DAILY
Qty: 180 TABLET | Refills: 3 | Status: SHIPPED | OUTPATIENT
Start: 2024-06-12

## 2024-06-13 ENCOUNTER — TELEPHONE (OUTPATIENT)
Dept: ADMINISTRATIVE | Facility: OTHER | Age: 49
End: 2024-06-13

## 2024-06-13 NOTE — TELEPHONE ENCOUNTER
----- Message from Angela GOMES sent at 6/13/2024  8:57 AM EDT -----  Regarding: Care Gap request  06/13/24 8:57 AM    Hello, our patient attached above has had Hepatitis C completed/performed. Please assist in updating the patient chart by pulling the Care Everywhere (CE) document. The date of service is 2020.     Thank you,  Angela VILLAFUERTE

## 2024-06-14 NOTE — TELEPHONE ENCOUNTER
Upon review of the In Basket request we were able to locate, review, and update the patient chart as requested for Hepatitis C .    Any additional questions or concerns should be emailed to the Practice Liaisons via the appropriate education email address, please do not reply via In Basket.    Thank you  Nereida Alejandro   [facility]

## 2024-06-15 DIAGNOSIS — Z00.6 ENCOUNTER FOR EXAMINATION FOR NORMAL COMPARISON OR CONTROL IN CLINICAL RESEARCH PROGRAM: ICD-10-CM

## 2024-06-18 ENCOUNTER — TELEPHONE (OUTPATIENT)
Dept: FAMILY MEDICINE CLINIC | Facility: CLINIC | Age: 49
End: 2024-06-18

## 2024-06-18 DIAGNOSIS — R74.8 ELEVATED LIVER ENZYMES: Primary | ICD-10-CM

## 2024-06-18 NOTE — TELEPHONE ENCOUNTER
----- Message from Brett Garces PA-C sent at 6/18/2024  7:14 AM EDT -----  Labs look good.  There are mild abnormalities with liver enzymes, and I will recheck these in 3 months.

## 2024-07-12 ENCOUNTER — TELEPHONE (OUTPATIENT)
Dept: FAMILY MEDICINE CLINIC | Facility: CLINIC | Age: 49
End: 2024-07-12

## 2024-07-12 LAB — COLOGUARD RESULT REPORTABLE: NEGATIVE

## 2024-09-23 ENCOUNTER — TELEPHONE (OUTPATIENT)
Dept: FAMILY MEDICINE CLINIC | Facility: CLINIC | Age: 49
End: 2024-09-23

## 2024-11-02 DIAGNOSIS — E89.41 SYMPTOMATIC POSTSURGICAL MENOPAUSE: ICD-10-CM

## 2024-11-05 RX ORDER — ESTRADIOL 0.04 MG/D
1 PATCH TRANSDERMAL WEEKLY
Qty: 4 PATCH | Refills: 0 | Status: SHIPPED | OUTPATIENT
Start: 2024-11-05

## 2024-12-04 ENCOUNTER — TELEPHONE (OUTPATIENT)
Dept: GYNECOLOGIC ONCOLOGY | Facility: CLINIC | Age: 49
End: 2024-12-04

## 2024-12-04 NOTE — TELEPHONE ENCOUNTER
I left a message informing the patient that KUMAR Ford has sooner appointment times available on 12/17/2024. I provided the office number for the patient to call if she can come in sooner. I informed the patient that if she is unable to come in sooner, Cher will still see her at 3:00PM.

## 2024-12-04 NOTE — TELEPHONE ENCOUNTER
Patient called back to say that she with be available any time after 2pm that day.  I made the change to the appt

## 2024-12-13 ENCOUNTER — TELEPHONE (OUTPATIENT)
Dept: GYNECOLOGIC ONCOLOGY | Facility: CLINIC | Age: 49
End: 2024-12-13

## 2024-12-13 NOTE — TELEPHONE ENCOUNTER
I left a message reminding the patient to get the  blood work done prior to the appointment with KUMAR Ford on 12/17/2024 at 2:20PM.

## 2024-12-17 ENCOUNTER — OFFICE VISIT (OUTPATIENT)
Dept: GYNECOLOGIC ONCOLOGY | Facility: CLINIC | Age: 49
End: 2024-12-17
Payer: COMMERCIAL

## 2024-12-17 VITALS
OXYGEN SATURATION: 97 % | SYSTOLIC BLOOD PRESSURE: 120 MMHG | HEIGHT: 61 IN | TEMPERATURE: 98.7 F | RESPIRATION RATE: 16 BRPM | BODY MASS INDEX: 28.05 KG/M2 | DIASTOLIC BLOOD PRESSURE: 78 MMHG | HEART RATE: 61 BPM | WEIGHT: 148.6 LBS

## 2024-12-17 DIAGNOSIS — Z85.43 HISTORY OF OVARIAN CANCER: ICD-10-CM

## 2024-12-17 DIAGNOSIS — Z08 ENCOUNTER FOR FOLLOW-UP SURVEILLANCE OF OVARIAN CANCER: Primary | ICD-10-CM

## 2024-12-17 DIAGNOSIS — Z85.43 ENCOUNTER FOR FOLLOW-UP SURVEILLANCE OF OVARIAN CANCER: Primary | ICD-10-CM

## 2024-12-17 DIAGNOSIS — E89.41 SYMPTOMATIC POSTSURGICAL MENOPAUSE: ICD-10-CM

## 2024-12-17 PROCEDURE — 99214 OFFICE O/P EST MOD 30 MIN: CPT | Performed by: NURSE PRACTITIONER

## 2024-12-17 PROCEDURE — 99459 PELVIC EXAMINATION: CPT | Performed by: NURSE PRACTITIONER

## 2024-12-17 RX ORDER — ESTRADIOL 0.03 MG/D
1 PATCH TRANSDERMAL WEEKLY
Qty: 4 PATCH | Refills: 2 | Status: SHIPPED | OUTPATIENT
Start: 2024-12-17

## 2024-12-17 RX ORDER — TRIAMCINOLONE ACETONIDE 1 MG/G
CREAM TOPICAL
COMMUNITY

## 2024-12-17 NOTE — ASSESSMENT & PLAN NOTE
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 She is feeling well and has no concerns. Her pelvic exam is without concern for recurrent disease. Her  was reviewed and is normal at 3.2 U/ml. Her PS is 0.    Patient will resume routine gyn care with her primary gynecologist. She will call the office with any new concerns.

## 2024-12-17 NOTE — PROGRESS NOTES
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 She is feeling well and has no concerns. Her pelvic exam is without concern for recurrent disease. Her  was reviewed and is normal at 3.2 U/ml. Her PS is 0.    Patient will resume routine gyn care with her primary gynecologist. She will call the office with any new concerns.         History of ovarian cancer         Symptomatic postsurgical menopause  Climara patch has been working well in controlling patient's hot flashes since she started the medication in . However, she is having skin reactions to the adhesive. I doubt that switching patch brands would be helpful, as she is sensitive to many products including skin glue from surgery, body wash, and detergent.    We discussed options, including oral HRT and SE's of estrogen-driven malignancies, VTE, etc. Alteratively, we recommended decreasing the dose of the Climara patch from 0.0375 to 0.025 with the goal of weaning her off in the next 6 months. She is in favor of the latter option. Plan discussed with [facility].    New rx sent to patient's pharmacy. If patient tolerates dose change well, we will defer future management to primary OB-GYN.    Orders:    estradiol (Climara) 0.025 mg/24 hr; Place 1 patch on the skin over 7 days once a week            History of Present Illness   Reason for Visit / CC: Borderline ovarian cancer surveillance       Ayala Ferreira is a 49 y.o. female   This is a 49 y.o. female last evaluated in 2024 who presents to the office for borderline ovarian cancer surveillance. She has been well in the interim and is without acute complaints.  Transdermal HRT has been very helpful with her hot flashes and mood swings. She denies nausea or vomiting. Her appetite is appropriate. She is voiding and moving her bowels without difficulty. She denies abdominal or pelvic pain. The patient is without vaginal bleeding or discharge. She is ambulatory.        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   HENT:  Negative for nosebleeds.    Eyes: Negative.    Respiratory:  Negative for cough, chest tightness, shortness of breath and wheezing.    Cardiovascular:  Negative for chest pain, palpitations and leg swelling.   Gastrointestinal:  Negative for abdominal distention, abdominal pain, anal bleeding, blood in stool, constipation, diarrhea, nausea, rectal pain and vomiting.   Endocrine: Negative.    Genitourinary:  Negative for difficulty urinating, dysuria, frequency, hematuria, pelvic pain, urgency, vaginal bleeding, vaginal discharge and vaginal pain.   Musculoskeletal:  Negative for arthralgias and joint swelling.   Skin:  Negative for color change, pallor and rash.   Neurological:  Negative for dizziness, weakness, light-headedness, numbness and headaches.   Hematological: Negative.    Psychiatric/Behavioral: Negative.      A complete review of systems is negative other than that noted above in the HPI.  Medical History Reviewed by provider this encounter:  Problems     .  Past Medical  History   Past Medical History:   Diagnosis Date    Abnormal uterine bleeding     Allergic     seasonal    Cervical polyp     Chronic diarrhea     Hyperlipidemia     Hypertension     Migraine     Pt was started on Effexor for prevention. Pt states it has helped    PONV (postoperative nausea and vomiting)     Pt reports having times one in     Post-operative nausea and vomiting     vomited x3 after procedure 8/3/22    Prediabetes     Wears contact lenses      Past Surgical History:   Procedure Laterality Date    ADRENAL GLAND SURGERY  2023     SECTION      COLONOSCOPY      FRACTURE SURGERY Right     Pt had a finger articular surgery for repair middle finger.    HYSTERECTOMY      HYSTERECTOMY  2022    HYSTEROSCOPY N/A 2021    Procedure: HYSTEROSCOPY, D&C, POLYPECTOMY;  Surgeon: Jamaal Schuster Jr., DO;  Location: [facility];  Service: Gynecology    VA LAPAROSCOPY ADRENALECTOMY PRTL/COMPL TABDL Left 2023    Procedure: LEFT ADRENALECTOMY LAPAROSCOPIC W/ ROBOTICS;  Surgeon: Francisco Javier Preciado MD;  Location: [facility];  Service: Surgical Oncology    VA LAPS FULG/EXC OVARY VISCERA/PERITONEAL SURFACE N/A 11/10/2022    Procedure: ROBOT BILATERAL OOPHORECTOMY, STAGING PERITONEAL BIOPSIES, EXCISION OF NODULAR/ABNORMAL APPEARING PERITONEAL AREAS, INFRACOLIC OMENTECTOMY;  Surgeon: Rao Adan MD;  Location: [facility];  Service: Gynecology Oncology    VA LAPS TOTAL HYSTERECT 250 GM/< W/RMVL TUBE/OVARY N/A 2022    Procedure: DIAGNOSTIC LAPAROSCOPY, ROBOTIC LAPAROSCOPIC HYSTERECTOMY, BILATERAL SALPINGECTOMY;  Surgeon: Jamaal Schuster Jr., DO;  Location: [facility];  Service: Gynecology    SHOULDER ARTHROSCOPY Right     SHOULDER SURGERY  2013    Impingement/debridement/bursa sack removal     Family History   Problem Relation Age of Onset    Heart disease Mother     COPD Mother     Diabetes Mother     Hypertension Mother     Cancer Mother         lung    Stroke Father      Diabetes Father     Leukemia Maternal Grandfather     Cancer Maternal Grandfather         Leukemia    Ovarian cancer Cousin     Diabetes Maternal Grandmother       reports that she has never smoked. She has never used smokeless tobacco. She reports current alcohol use of about 3.0 standard drinks of alcohol per week. She reports that she does not use drugs.  Current Outpatient Medications on File Prior to Visit   Medication Sig Dispense Refill    amLODIPine (NORVASC) 10 mg tablet Take 1 tablet (10 mg total) by mouth daily 90 tablet 3    atenolol (TENORMIN) 25 mg tablet Take 1 tablet (25 mg total) by mouth 2 (two) times a day 180 tablet 3    Calcium-Magnesium-Vitamin D (CALCIUM 1200+D3 PO) Take 1 tablet by mouth daily      fenofibrate (TRICOR) 145 mg tablet Take 1 tablet (145 mg total) by mouth daily 90 tablet 3    fluticasone (FLONASE) 50 mcg/act nasal spray 1 spray into each nostril daily      hydroCHLOROthiazide 12.5 mg tablet Take 1 tablet (12.5 mg total) by mouth daily 90 tablet 3    losartan (COZAAR) 100 MG tablet Take 1 tablet (100 mg total) by mouth daily 90 tablet 3    Multiple Vitamins-Minerals (MULTIVITAMIN WOMEN PO) Take 1 tablet by mouth daily      topiramate (TOPAMAX) 50 MG tablet Take 50 mg by mouth every 12 (twelve) hours      triamcinolone (KENALOG) 0.1 % cream APPLY TOPICALLY TO AFFECTED AREA 2 TIMES A DAY      [DISCONTINUED] estradiol (Climara) 0.0375 MG/24HR Place 1 patch on the skin over 7 days once a week 4 patch 0     No current facility-administered medications on file prior to visit.     Allergies   Allergen Reactions    Pneumococcal Vaccines Anaphylaxis     Pt states she hallucinated.     Latex Hives      Current Outpatient Medications on File Prior to Visit   Medication Sig Dispense Refill    amLODIPine (NORVASC) 10 mg tablet Take 1 tablet (10 mg total) by mouth daily 90 tablet 3    atenolol (TENORMIN) 25 mg tablet Take 1 tablet (25 mg total) by mouth 2 (two) times a day 180 tablet 3     "Calcium-Magnesium-Vitamin D (CALCIUM 1200+D3 PO) Take 1 tablet by mouth daily      fenofibrate (TRICOR) 145 mg tablet Take 1 tablet (145 mg total) by mouth daily 90 tablet 3    fluticasone (FLONASE) 50 mcg/act nasal spray 1 spray into each nostril daily      hydroCHLOROthiazide 12.5 mg tablet Take 1 tablet (12.5 mg total) by mouth daily 90 tablet 3    losartan (COZAAR) 100 MG tablet Take 1 tablet (100 mg total) by mouth daily 90 tablet 3    Multiple Vitamins-Minerals (MULTIVITAMIN WOMEN PO) Take 1 tablet by mouth daily      topiramate (TOPAMAX) 50 MG tablet Take 50 mg by mouth every 12 (twelve) hours      triamcinolone (KENALOG) 0.1 % cream APPLY TOPICALLY TO AFFECTED AREA 2 TIMES A DAY      [DISCONTINUED] estradiol (Climara) 0.0375 MG/24HR Place 1 patch on the skin over 7 days once a week 4 patch 0     No current facility-administered medications on file prior to visit.      Social History     Tobacco Use    Smoking status: Never    Smokeless tobacco: Never   Vaping Use    Vaping status: Never Used   Substance and Sexual Activity    Alcohol use: Yes     Alcohol/week: 3.0 standard drinks of alcohol     Types: 3 Cans of beer per week     Comment: occasionally    Drug use: Never    Sexual activity: Yes     Partners: Male     Birth control/protection: Male Sterilization     Comment: did not ask        Objective   /78 (BP Location: Left arm, Patient Position: Sitting, Cuff Size: Large)   Pulse 61   Temp 98.7 °F (37.1 °C) (Temporal)   Resp 16   Ht 5' 1\" (1.549 m)   Wt 67.4 kg (148 lb 9.6 oz)   LMP 07/05/2022 (Exact Date)   SpO2 97%   BMI 28.08 kg/m²     Body mass index is 28.08 kg/m².  ECOG   0  Physical Exam  Vitals reviewed. Exam conducted with a chaperone present.   Constitutional:       General: She is not in acute distress.     Appearance: Normal appearance. She is not ill-appearing.   HENT:      Head: Normocephalic and atraumatic.      Mouth/Throat:      Mouth: Mucous membranes are moist.   Eyes:      " "General:         Right eye: No discharge.         Left eye: No discharge.      Conjunctiva/sclera: Conjunctivae normal.   Pulmonary:      Effort: Pulmonary effort is normal.   Abdominal:      Palpations: Abdomen is soft. There is no mass.      Tenderness: There is no abdominal tenderness.      Hernia: No hernia is present.   Genitourinary:     Comments: The external female genitalia is normal. The bartholin's, uretheral and skenes glands are normal. The urethral meatus is normal (midline with no lesions). Anus without fissure or lesion. Speculum exam reveals a grossly normal vagina. No masses, lesions,discharge or bleeding. No significant cystocele or rectocele noted. Bimanual exam notes a surgical absent cervix, uterus and adnexal structures. No masses or fullness. Bladder is without fullness, mass or tenderness.  Musculoskeletal:      Right lower leg: No edema.      Left lower leg: No edema.   Skin:     General: Skin is warm and dry.      Coloration: Skin is not jaundiced.      Findings: No rash.   Neurological:      General: No focal deficit present.      Mental Status: She is alert and oriented to person, place, and time.      Cranial Nerves: No cranial nerve deficit.      Sensory: No sensory deficit.      Motor: No weakness.      Gait: Gait normal.   Psychiatric:         Mood and Affect: Mood normal.         Behavior: Behavior normal.         Thought Content: Thought content normal.         Judgment: Judgment normal.          Labs: I have reviewed pertinent labs. CA-125: No results found for: \"\"   No results found for: \"\"  Lab Results   Component Value Date/Time    Potassium 4.7 09/21/2024 08:15 AM    Chloride 101 09/21/2024 08:15 AM    Carbon Dioxide 28 09/21/2024 08:15 AM    BUN 23 (H) 09/21/2024 08:15 AM    Creatinine 0.9 09/21/2024 08:15 AM    Calcium 9.3 09/21/2024 08:15 AM    AST 27 09/21/2024 08:15 AM    ALT 50 (H) 09/21/2024 08:15 AM    Alkaline Phosphatase 65 09/21/2024 08:15 AM    EGFR 81 " "09/21/2024 08:15 AM     No results found for: \"WBC\", \"HGB\", \"HCT\", \"MCV\", \"PLT\"  No results found for: \"NEUTROABS\"     Trend:  Lab Results   Component Value Date     2.6 11/27/2023     8.2 08/17/2022               "

## 2024-12-17 NOTE — ASSESSMENT & PLAN NOTE
Climara patch has been working well in controlling patient's hot flashes since she started the medication in June. However, she is having skin reactions to the adhesive. I doubt that switching patch brands would be helpful, as she is sensitive to many products including skin glue from surgery, body wash, and detergent.    We discussed options, including oral HRT and SE's of estrogen-driven malignancies, VTE, etc. Alteratively, we recommended decreasing the dose of the Climara patch from 0.0375 to 0.025 with the goal of weaning her off in the next 6 months. She is in favor of the latter option. Plan discussed with [facility].    New rx sent to patient's pharmacy. If patient tolerates dose change well, we will defer future management to primary OB-GYN.    Orders:    estradiol (Climara) 0.025 mg/24 hr; Place 1 patch on the skin over 7 days once a week

## 2024-12-18 ENCOUNTER — OFFICE VISIT (OUTPATIENT)
Dept: FAMILY MEDICINE CLINIC | Facility: CLINIC | Age: 49
End: 2024-12-18
Payer: COMMERCIAL

## 2024-12-18 ENCOUNTER — TELEPHONE (OUTPATIENT)
Age: 49
End: 2024-12-18

## 2024-12-18 ENCOUNTER — NURSE TRIAGE (OUTPATIENT)
Age: 49
End: 2024-12-18

## 2024-12-18 VITALS
BODY MASS INDEX: 28.72 KG/M2 | HEART RATE: 71 BPM | TEMPERATURE: 97.9 F | OXYGEN SATURATION: 99 % | SYSTOLIC BLOOD PRESSURE: 112 MMHG | DIASTOLIC BLOOD PRESSURE: 82 MMHG | WEIGHT: 152 LBS

## 2024-12-18 DIAGNOSIS — E89.6 HISTORY OF TOTAL ADRENALECTOMY (HCC): ICD-10-CM

## 2024-12-18 DIAGNOSIS — R21 RASH: ICD-10-CM

## 2024-12-18 DIAGNOSIS — E78.2 MIXED HYPERLIPIDEMIA: ICD-10-CM

## 2024-12-18 DIAGNOSIS — Z85.43 HISTORY OF OVARIAN CANCER: ICD-10-CM

## 2024-12-18 DIAGNOSIS — Z23 ENCOUNTER FOR IMMUNIZATION: ICD-10-CM

## 2024-12-18 DIAGNOSIS — R73.03 PREDIABETES: ICD-10-CM

## 2024-12-18 DIAGNOSIS — I10 PRIMARY HYPERTENSION: Primary | ICD-10-CM

## 2024-12-18 DIAGNOSIS — I10 PRIMARY HYPERTENSION: ICD-10-CM

## 2024-12-18 PROCEDURE — 90471 IMMUNIZATION ADMIN: CPT

## 2024-12-18 PROCEDURE — 90656 IIV3 VACC NO PRSV 0.5 ML IM: CPT

## 2024-12-18 PROCEDURE — 99214 OFFICE O/P EST MOD 30 MIN: CPT

## 2024-12-18 RX ORDER — HYDROXYZINE HYDROCHLORIDE 25 MG/1
25 TABLET, FILM COATED ORAL EVERY 6 HOURS PRN
Qty: 90 TABLET | Refills: 1 | Status: SHIPPED | OUTPATIENT
Start: 2024-12-18

## 2024-12-18 RX ORDER — AMLODIPINE BESYLATE 10 MG/1
10 TABLET ORAL DAILY
Qty: 90 TABLET | Refills: 3 | Status: SHIPPED | OUTPATIENT
Start: 2024-12-18

## 2024-12-18 RX ORDER — FENOFIBRATE 145 MG/1
145 TABLET, COATED ORAL DAILY
Qty: 90 TABLET | Refills: 3 | Status: SHIPPED | OUTPATIENT
Start: 2024-12-18 | End: 2024-12-18 | Stop reason: SDUPTHER

## 2024-12-18 RX ORDER — LOSARTAN POTASSIUM 100 MG/1
100 TABLET ORAL DAILY
Qty: 90 TABLET | Refills: 3 | Status: SHIPPED | OUTPATIENT
Start: 2024-12-18

## 2024-12-18 RX ORDER — ATENOLOL 25 MG/1
25 TABLET ORAL 2 TIMES DAILY
Qty: 180 TABLET | Refills: 3 | Status: SHIPPED | OUTPATIENT
Start: 2024-12-18 | End: 2024-12-18 | Stop reason: SDUPTHER

## 2024-12-18 RX ORDER — HYDROXYZINE HYDROCHLORIDE 25 MG/1
25 TABLET, FILM COATED ORAL EVERY 6 HOURS PRN
Qty: 90 TABLET | Refills: 1 | Status: SHIPPED | OUTPATIENT
Start: 2024-12-18 | End: 2024-12-18 | Stop reason: SDUPTHER

## 2024-12-18 RX ORDER — AMLODIPINE BESYLATE 10 MG/1
10 TABLET ORAL DAILY
Qty: 90 TABLET | Refills: 3 | Status: SHIPPED | OUTPATIENT
Start: 2024-12-18 | End: 2024-12-18 | Stop reason: SDUPTHER

## 2024-12-18 RX ORDER — HYDROCHLOROTHIAZIDE 12.5 MG/1
12.5 TABLET ORAL DAILY
Qty: 90 TABLET | Refills: 3 | Status: SHIPPED | OUTPATIENT
Start: 2024-12-18

## 2024-12-18 RX ORDER — HYDROCHLOROTHIAZIDE 12.5 MG/1
12.5 TABLET ORAL DAILY
Qty: 90 TABLET | Refills: 3 | Status: SHIPPED | OUTPATIENT
Start: 2024-12-18 | End: 2024-12-18 | Stop reason: SDUPTHER

## 2024-12-18 RX ORDER — LOSARTAN POTASSIUM 100 MG/1
100 TABLET ORAL DAILY
Qty: 90 TABLET | Refills: 3 | Status: SHIPPED | OUTPATIENT
Start: 2024-12-18 | End: 2024-12-18 | Stop reason: SDUPTHER

## 2024-12-18 RX ORDER — FENOFIBRATE 145 MG/1
145 TABLET, COATED ORAL DAILY
Qty: 90 TABLET | Refills: 3 | Status: SHIPPED | OUTPATIENT
Start: 2024-12-18

## 2024-12-18 RX ORDER — ATENOLOL 25 MG/1
25 TABLET ORAL 2 TIMES DAILY
Qty: 180 TABLET | Refills: 3 | Status: SHIPPED | OUTPATIENT
Start: 2024-12-18

## 2024-12-18 NOTE — TELEPHONE ENCOUNTER
Regarding: medication problem  ----- Message from Danika GREY sent at 12/18/2024  4:05 PM EST -----  Pt would like script for hydroxyzine HCL 25mg switched to [facility], and hydrochlorothiazide 12.5mg script switched to [facility].  Thank you

## 2024-12-18 NOTE — ASSESSMENT & PLAN NOTE
Educated on healthy diet and activity.  Will continue to monitor.  Orders:    CBC and differential; Future    Comprehensive metabolic panel; Future    Hemoglobin A1C; Future

## 2024-12-18 NOTE — PROGRESS NOTES
Name: Ayala Ferreira      : 1975      MRN: [id number]  Encounter Provider: Brett Garces PA-C  Encounter Date: 2024   Encounter department: [facility]  :  Assessment & Plan  Primary hypertension  Stable on amlodipine, HCTZ, losartan.  Encouraged to take at home blood pressures and contact office if persistently elevated.   Orders:    hydroCHLOROthiazide 12.5 mg tablet; Take 1 tablet (12.5 mg total) by mouth daily    amLODIPine (NORVASC) 10 mg tablet; Take 1 tablet (10 mg total) by mouth daily    atenolol (TENORMIN) 25 mg tablet; Take 1 tablet (25 mg total) by mouth 2 (two) times a day    losartan (COZAAR) 100 MG tablet; Take 1 tablet (100 mg total) by mouth daily    Mixed hyperlipidemia  Continue fenofibrate.  Will continue to monitor.  Orders:    Lipid panel; Future    fenofibrate (TRICOR) 145 mg tablet; Take 1 tablet (145 mg total) by mouth daily    History of total adrenalectomy (HCC)  Stable.  Continue current.       Prediabetes  Educated on healthy diet and activity.  Will continue to monitor.  Orders:    CBC and differential; Future    Comprehensive metabolic panel; Future    Hemoglobin A1C; Future    History of ovarian cancer  Continue follow-up with GYN.       Rash  Will refer to dermatology.  Patient can continue triamcinolone and is prescribed hydroxyzine.  Educated on side effects of medication.  Contact office with symptom progression.  Orders:    Ambulatory Referral to Dermatology; Future    hydrOXYzine HCL (ATARAX) 25 mg tablet; Take 1 tablet (25 mg total) by mouth every 6 (six) hours as needed for itching    Encounter for immunization    Orders:    influenza vaccine preservative-free 0.5 mL IM (Fluzone, Afluria, Fluarix, Flulaval)           History of Present Illness     Patient is a 49-year-old female presenting for.  Patient has had a rash for 2 months.  Patient states that she is allergic to the adhesive, and has been working with more adhesive at work.   Was diagnosed with contact/allergic dermatitis, and was prescribed triamcinolone.  Triamcinolone will help but she has to use a lot of effort to help.  Denies fever, chills URI symptoms, bleeding, scaling.  Has not tried anything else for this.  No other questions or concerns today.      Review of Systems   Constitutional:  Negative for appetite change, chills, diaphoresis, fatigue and fever.   HENT:  Negative for congestion, ear discharge, ear pain, postnasal drip, rhinorrhea, sinus pressure, sinus pain, sneezing and sore throat.    Eyes:  Negative for pain, discharge, redness, itching and visual disturbance.   Respiratory:  Negative for apnea, cough, chest tightness, shortness of breath and wheezing.    Cardiovascular:  Negative for chest pain, palpitations and leg swelling.   Gastrointestinal:  Negative for abdominal pain, blood in stool, constipation, diarrhea, nausea and vomiting.   Endocrine: Negative for cold intolerance, heat intolerance, polydipsia and polyuria.   Genitourinary:  Negative for dysuria, flank pain, frequency, hematuria and urgency.   Musculoskeletal:  Negative for arthralgias, back pain, myalgias, neck pain and neck stiffness.   Skin:  Positive for rash. Negative for color change.   Allergic/Immunologic: Negative for environmental allergies and food allergies.   Neurological:  Negative for dizziness, tremors, seizures, syncope, speech difficulty, weakness, light-headedness, numbness and headaches.   Hematological:  Negative for adenopathy. Does not bruise/bleed easily.   Psychiatric/Behavioral:  Negative for agitation, confusion, decreased concentration, dysphoric mood, hallucinations, self-injury, sleep disturbance and suicidal ideas. The patient is not nervous/anxious and is not hyperactive.    All other systems reviewed and are negative.      Objective   /82 (BP Location: Left arm, Patient Position: Sitting)   Pulse 71   Temp 97.9 °F (36.6 °C) (Tympanic)   Wt 68.9 kg (152 lb)    LMP 07/05/2022 (Exact Date)   SpO2 99%   BMI 28.72 kg/m²      Physical Exam  Vitals and nursing note reviewed.   Constitutional:       General: She is not in acute distress.     Appearance: Normal appearance. She is well-developed and normal weight. She is not ill-appearing, toxic-appearing or diaphoretic.   HENT:      Head: Normocephalic and atraumatic.      Right Ear: Tympanic membrane normal.      Left Ear: Tympanic membrane normal.      Nose: Nose normal.      Mouth/Throat:      Mouth: Mucous membranes are moist.      Pharynx: Oropharynx is clear.   Eyes:      Conjunctiva/sclera: Conjunctivae normal.      Pupils: Pupils are equal, round, and reactive to light.   Cardiovascular:      Rate and Rhythm: Normal rate and regular rhythm.      Pulses: Normal pulses.      Heart sounds: Normal heart sounds. No murmur heard.  Pulmonary:      Effort: Pulmonary effort is normal. No respiratory distress.      Breath sounds: Normal breath sounds. No wheezing.   Chest:      Chest wall: No tenderness.   Abdominal:      General: Bowel sounds are normal.      Palpations: Abdomen is soft. There is no mass.      Tenderness: There is no abdominal tenderness.   Musculoskeletal:         General: No swelling or tenderness.      Cervical back: Normal range of motion and neck supple. No tenderness.      Right lower leg: No edema.      Left lower leg: No edema.   Lymphadenopathy:      Cervical: No cervical adenopathy.   Skin:     General: Skin is warm and dry.      Capillary Refill: Capillary refill takes less than 2 seconds.      Findings: Erythema, lesion and rash (Erythematous macular papular rash distributed along bilateral arms.) present.   Neurological:      General: No focal deficit present.      Mental Status: She is alert and oriented to person, place, and time. Mental status is at baseline.      Motor: No weakness.      Coordination: Coordination normal.      Gait: Gait normal.   Psychiatric:         Mood and Affect: Mood  normal.         Behavior: Behavior normal.         Thought Content: Thought content normal.         Judgment: Judgment normal.

## 2024-12-18 NOTE — ASSESSMENT & PLAN NOTE
Will refer to dermatology.  Patient can continue triamcinolone and is prescribed hydroxyzine.  Educated on side effects of medication.  Contact office with symptom progression.  Orders:    Ambulatory Referral to Dermatology; Future    hydrOXYzine HCL (ATARAX) 25 mg tablet; Take 1 tablet (25 mg total) by mouth every 6 (six) hours as needed for itching

## 2024-12-18 NOTE — TELEPHONE ENCOUNTER
"Patient requesting medications be resent to different pharmacies. Per patient request switched and sent. Nothing else needed at this time.     Reason for Disposition   Prescription prescribed recently is not at pharmacy and triager has access to patient's EMR and prescription is recorded in the EMR    Answer Assessment - Initial Assessment Questions  1. NAME of MEDICINE: \"What medicine(s) are you calling about?\"      hydroxyzine HCL 25mg, hydrochlorothiazide 12.5mg  2. QUESTION: \"What is your question?\" (e.g., double dose of medicine, side effect)      Medications sent to wrong pharmacies. Pt would like script for hydroxyzine HCL 25mg switched to [facility], and hydrochlorothiazide 12.5mg script switched to [facility]  3. PRESCRIBER: \"Who prescribed the medicine?\" Reason: if prescribed by specialist, call should be referred to that group.      Brett Garces PA-C    Protocols used: Medication Question Call-Adult-[place]    "

## 2024-12-18 NOTE — ASSESSMENT & PLAN NOTE
Stable on amlodipine, HCTZ, losartan.  Encouraged to take at home blood pressures and contact office if persistently elevated.   Orders:    hydroCHLOROthiazide 12.5 mg tablet; Take 1 tablet (12.5 mg total) by mouth daily    amLODIPine (NORVASC) 10 mg tablet; Take 1 tablet (10 mg total) by mouth daily    atenolol (TENORMIN) 25 mg tablet; Take 1 tablet (25 mg total) by mouth 2 (two) times a day    losartan (COZAAR) 100 MG tablet; Take 1 tablet (100 mg total) by mouth daily

## 2024-12-18 NOTE — ASSESSMENT & PLAN NOTE
Continue fenofibrate.  Will continue to monitor.  Orders:    Lipid panel; Future    fenofibrate (TRICOR) 145 mg tablet; Take 1 tablet (145 mg total) by mouth daily

## 2024-12-18 NOTE — TELEPHONE ENCOUNTER
Call hub message sent in regards to pharmacy change. Pt would like hydroxyzine HCL 25mg sent to [facility], and hydrochlorothiazide 12.5mg switched to [facility].

## 2025-06-09 ENCOUNTER — RESULTS FOLLOW-UP (OUTPATIENT)
Dept: FAMILY MEDICINE CLINIC | Facility: CLINIC | Age: 50
End: 2025-06-09

## 2025-06-18 ENCOUNTER — OFFICE VISIT (OUTPATIENT)
Dept: FAMILY MEDICINE CLINIC | Facility: CLINIC | Age: 50
End: 2025-06-18
Payer: COMMERCIAL

## 2025-06-18 VITALS
OXYGEN SATURATION: 98 % | HEART RATE: 65 BPM | SYSTOLIC BLOOD PRESSURE: 122 MMHG | WEIGHT: 153.4 LBS | HEIGHT: 61 IN | DIASTOLIC BLOOD PRESSURE: 60 MMHG | TEMPERATURE: 97.8 F | BODY MASS INDEX: 28.96 KG/M2

## 2025-06-18 DIAGNOSIS — E89.6 HISTORY OF TOTAL ADRENALECTOMY (HCC): ICD-10-CM

## 2025-06-18 DIAGNOSIS — Z12.31 ENCOUNTER FOR SCREENING MAMMOGRAM FOR BREAST CANCER: ICD-10-CM

## 2025-06-18 DIAGNOSIS — E78.2 MIXED HYPERLIPIDEMIA: ICD-10-CM

## 2025-06-18 DIAGNOSIS — I10 PRIMARY HYPERTENSION: ICD-10-CM

## 2025-06-18 DIAGNOSIS — R73.03 PREDIABETES: ICD-10-CM

## 2025-06-18 DIAGNOSIS — Z00.00 ANNUAL PHYSICAL EXAM: Primary | ICD-10-CM

## 2025-06-18 PROCEDURE — 99396 PREV VISIT EST AGE 40-64: CPT

## 2025-06-18 RX ORDER — HYDROCHLOROTHIAZIDE 12.5 MG/1
12.5 TABLET ORAL DAILY
Qty: 90 TABLET | Refills: 3 | Status: SHIPPED | OUTPATIENT
Start: 2025-06-18

## 2025-06-18 RX ORDER — HYDROCHLOROTHIAZIDE 12.5 MG/1
12.5 TABLET ORAL DAILY
Start: 2025-06-18 | End: 2025-06-18 | Stop reason: SDUPTHER

## 2025-06-18 RX ORDER — ATENOLOL 25 MG/1
25 TABLET ORAL 2 TIMES DAILY
Qty: 180 TABLET | Refills: 3 | Status: SHIPPED | OUTPATIENT
Start: 2025-06-18

## 2025-06-18 RX ORDER — BETAMETHASONE DIPROPIONATE 0.5 MG/G
CREAM TOPICAL AS NEEDED
COMMUNITY

## 2025-06-18 RX ORDER — FENOFIBRATE 145 MG/1
145 TABLET, FILM COATED ORAL DAILY
Qty: 90 TABLET | Refills: 3 | Status: SHIPPED | OUTPATIENT
Start: 2025-06-18

## 2025-06-18 RX ORDER — LOSARTAN POTASSIUM 100 MG/1
100 TABLET ORAL DAILY
Qty: 90 TABLET | Refills: 3 | Status: SHIPPED | OUTPATIENT
Start: 2025-06-18

## 2025-06-18 NOTE — ASSESSMENT & PLAN NOTE
At home blood pressures have been primarily around 90/60 with associated dizziness and 1 fall.  Will decrease amlodipine to 5 mg daily.  Take at home blood pressures daily for 2 weeks and contact office with progression.  Will increase back up to 10 mg if above goal, or take off medication completely if issues still arise.  Atenolol primarily used for migraines and HCTZ for leg swelling, so avoiding stopping these medications due to their multiple usage.  Orders:    atenolol (TENORMIN) 25 mg tablet; Take 1 tablet (25 mg total) by mouth in the morning and 1 tablet (25 mg total) before bedtime.    hydroCHLOROthiazide 12.5 mg tablet; Take 1 tablet (12.5 mg total) by mouth daily    losartan (COZAAR) 100 MG tablet; Take 1 tablet (100 mg total) by mouth daily

## 2025-06-18 NOTE — PROGRESS NOTES
Adult Annual Physical  Name: Ayala Ferreira      : 1975      MRN: [id number]  Encounter Provider: Brett Garces PA-C  Encounter Date: 2025   Encounter department: [facility]    :  Assessment & Plan  Annual physical exam         Primary hypertension  At home blood pressures have been primarily around 90/60 with associated dizziness and 1 fall.  Will decrease amlodipine to 5 mg daily.  Take at home blood pressures daily for 2 weeks and contact office with progression.  Will increase back up to 10 mg if above goal, or take off medication completely if issues still arise.  Atenolol primarily used for migraines and HCTZ for leg swelling, so avoiding stopping these medications due to their multiple usage.  Orders:    atenolol (TENORMIN) 25 mg tablet; Take 1 tablet (25 mg total) by mouth in the morning and 1 tablet (25 mg total) before bedtime.    hydroCHLOROthiazide 12.5 mg tablet; Take 1 tablet (12.5 mg total) by mouth daily    losartan (COZAAR) 100 MG tablet; Take 1 tablet (100 mg total) by mouth daily    History of total adrenalectomy (HCC)  Left adrenalectomy.  Stable.       Prediabetes  Stable.  Educated on healthy diet and activity.  Will continue to monitor.  Orders:    CBC and differential; Future    Comprehensive metabolic panel; Future    Hemoglobin A1C; Future    Mixed hyperlipidemia  Stable.  Educated on healthy diet and activity.  Will continue to monitor.  Orders:    Lipid panel; Future    fenofibrate (TRICOR) 145 mg tablet; Take 1 tablet (145 mg total) by mouth daily    Encounter for screening mammogram for breast cancer    Orders:    Mammo screening bilateral w 3d and cad; Future        Preventive Screenings:  - Diabetes Screening: screening up-to-date  - Cholesterol Screening: screening not indicated and has hyperlipidemia   - Hepatitis C screening: screening up-to-date   - Cervical cancer screening: screening not indicated   - Breast cancer screening: screening  up-to-date, risks/benefits discussed and orders placed   - Colon cancer screening: screening up-to-date   - Lung cancer screening: screening not indicated     Immunizations:    - Risks/benefits immunizations discussed      Counseling/Anticipatory Guidance:  - Alcohol: discussed moderation in alcohol intake and recommendations for healthy alcohol use.   - Drug use: discussed harms of illicit drug use and how it can negatively impact mental/physical health.   - Tobacco use: discussed harms of tobacco use and management options for quitting.   - Dental health: discussed importance of regular tooth brushing, flossing, and dental visits.   - Sexual health: discussed sexually transmitted diseases, partner selection, use of condoms, avoidance of unintended pregnancy, and contraceptive alternatives.   - Diet: discussed recommendations for a healthy/well-balanced diet.   - Exercise: the importance of regular exercise/physical activity was discussed. Recommend exercise 3-5 times per week for at least 30 minutes.   - Injury prevention: discussed safety/seat belts, safety helmets, smoke detectors, carbon monoxide detectors, and smoking near bedding or upholstery.          History of Present Illness     Adult Annual Physical:  Patient presents for annual physical.     Diet and Physical Activity:  - Diet/Nutrition: no special diet.  - Exercise: no formal exercise.    Depression Screening:  - PHQ-2 Score: 0    General Health:  - Sleep: 7-8 hours of sleep on average.  - Hearing: normal hearing bilateral ears.  - Vision: wears glasses and wears contacts.  - Dental: regular dental visits.    /GYN Health:  - Follows with GYN: yes.   - Menopause: postmenopausal.   - History of STDs: no  - Contraception: hysterectomy, menopause and male partner had vasectomy.      Advanced Care Planning:  - Has an advanced directive?: no    - Has a durable medical POA?: no      Review of Systems   Constitutional:  Negative for appetite change, chills,  "diaphoresis, fatigue and fever.   HENT:  Negative for congestion, ear discharge, ear pain, postnasal drip, rhinorrhea, sinus pressure, sinus pain, sneezing and sore throat.    Eyes:  Negative for pain, discharge, redness, itching and visual disturbance.   Respiratory:  Negative for apnea, cough, chest tightness, shortness of breath and wheezing.    Cardiovascular:  Negative for chest pain, palpitations and leg swelling.   Gastrointestinal:  Negative for abdominal pain, blood in stool, constipation, diarrhea, nausea and vomiting.   Endocrine: Negative for cold intolerance, heat intolerance, polydipsia and polyuria.   Genitourinary:  Negative for dysuria, flank pain, frequency, hematuria and urgency.   Musculoskeletal:  Negative for arthralgias, back pain, myalgias, neck pain and neck stiffness.   Skin:  Negative for color change and rash.   Allergic/Immunologic: Negative for environmental allergies and food allergies.   Neurological:  Negative for dizziness, tremors, seizures, syncope, speech difficulty, weakness, light-headedness, numbness and headaches.   Hematological:  Negative for adenopathy. Does not bruise/bleed easily.   Psychiatric/Behavioral:  Negative for agitation, confusion, decreased concentration, dysphoric mood, hallucinations, self-injury, sleep disturbance and suicidal ideas. The patient is not nervous/anxious and is not hyperactive.    All other systems reviewed and are negative.        Objective   /60   Pulse 65   Temp 97.8 °F (36.6 °C) (Tympanic)   Ht 5' 1\" (1.549 m)   Wt 69.6 kg (153 lb 6.4 oz)   LMP 07/05/2022 (Exact Date)   SpO2 98%   BMI 28.98 kg/m²     Physical Exam  Vitals and nursing note reviewed.   Constitutional:       General: She is not in acute distress.     Appearance: Normal appearance. She is well-developed and normal weight. She is not ill-appearing, toxic-appearing or diaphoretic.   HENT:      Head: Normocephalic and atraumatic.      Right Ear: Tympanic membrane " normal.      Left Ear: Tympanic membrane normal.      Nose: Nose normal.      Mouth/Throat:      Mouth: Mucous membranes are moist.      Pharynx: Oropharynx is clear.     Eyes:      Conjunctiva/sclera: Conjunctivae normal.      Pupils: Pupils are equal, round, and reactive to light.     Neck:      Vascular: No carotid bruit.     Cardiovascular:      Rate and Rhythm: Normal rate and regular rhythm.      Pulses: Normal pulses.      Heart sounds: Normal heart sounds. No murmur heard.  Pulmonary:      Effort: Pulmonary effort is normal. No respiratory distress.      Breath sounds: Normal breath sounds. No wheezing.   Chest:      Chest wall: No tenderness.   Abdominal:      General: Bowel sounds are normal.      Palpations: Abdomen is soft. There is no mass.      Tenderness: There is no abdominal tenderness.     Musculoskeletal:         General: No swelling or tenderness.      Cervical back: Normal range of motion and neck supple. No tenderness.      Right lower leg: No edema.      Left lower leg: No edema.   Lymphadenopathy:      Cervical: No cervical adenopathy.     Skin:     General: Skin is warm and dry.      Capillary Refill: Capillary refill takes less than 2 seconds.      Findings: No erythema, lesion or rash.     Neurological:      General: No focal deficit present.      Mental Status: She is alert and oriented to person, place, and time. Mental status is at baseline.      Motor: No weakness.      Coordination: Coordination normal.      Gait: Gait normal.     Psychiatric:         Mood and Affect: Mood normal.         Behavior: Behavior normal.         Thought Content: Thought content normal.         Judgment: Judgment normal.

## 2025-06-18 NOTE — ASSESSMENT & PLAN NOTE
Stable.  Educated on healthy diet and activity.  Will continue to monitor.  Orders:    CBC and differential; Future    Comprehensive metabolic panel; Future    Hemoglobin A1C; Future

## 2025-06-18 NOTE — ASSESSMENT & PLAN NOTE
Stable.  Educated on healthy diet and activity.  Will continue to monitor.  Orders:    Lipid panel; Future    fenofibrate (TRICOR) 145 mg tablet; Take 1 tablet (145 mg total) by mouth daily

## (undated) DEVICE — VISUALIZATION SYSTEM: Brand: CLEARIFY

## (undated) DEVICE — FENESTRATED BIPOLAR FORCEPS: Brand: ENDOWRIST

## (undated) DEVICE — INTENDED FOR TISSUE SEPARATION, AND OTHER PROCEDURES THAT REQUIRE A SHARP SURGICAL BLADE TO PUNCTURE OR CUT.: Brand: BARD-PARKER SAFETY BLADES SIZE 15, STERILE

## (undated) DEVICE — SCD SEQUENTIAL COMPRESSION COMFORT SLEEVE MEDIUM KNEE LENGTH: Brand: KENDALL SCD

## (undated) DEVICE — PAD GROUNDING ADULT

## (undated) DEVICE — MAXI PAD5.51 X 13.78 IN. (14.0 X 35.0 CM)HEAVYCONTOUREDUNSCENTED: Brand: CURITY

## (undated) DEVICE — ELECTRODE BLADE MOD E-Z CLEAN 2.5IN 6.4CM -0012M

## (undated) DEVICE — SYRINGE 10ML LL

## (undated) DEVICE — SUT VICRYL 0 CT-1 36 IN J946H

## (undated) DEVICE — 3000CC GUARDIAN II: Brand: GUARDIAN

## (undated) DEVICE — NEEDLE 25G X 1 1/2

## (undated) DEVICE — PREMIUM DRY TRAY LF: Brand: MEDLINE INDUSTRIES, INC.

## (undated) DEVICE — SUT VLOC 90 2-0 GS-22 12 IN VLOCM2115

## (undated) DEVICE — CLAMP TOWEL TUBING DISPOSABLE

## (undated) DEVICE — TUBING INSUFFLATION SET ISO CONNECTOR

## (undated) DEVICE — MAYO STAND COVER: Brand: CONVERTORS

## (undated) DEVICE — SUT SILK 0 30 IN A306H

## (undated) DEVICE — VESSEL SEALER EXTEND: Brand: ENDOWRIST

## (undated) DEVICE — BETHLEHEM UNIVERSAL LAPAROTOMY: Brand: CARDINAL HEALTH

## (undated) DEVICE — TROCAR: Brand: KII SLEEVE

## (undated) DEVICE — GLOVE INDICATOR PI UNDERGLOVE SZ 7 BLUE

## (undated) DEVICE — CHLORAPREP HI-LITE 26ML ORANGE

## (undated) DEVICE — BLADELESS OBTURATOR: Brand: WECK VISTA

## (undated) DEVICE — TRAY FOLEY 16FR URIMETER SILICONE SURESTEP

## (undated) DEVICE — TIP-UP FENESTRATED GRASPER: Brand: ENDOWRIST

## (undated) DEVICE — UTERINE MANIPULATOR RUMI 6.7 X 6 CM

## (undated) DEVICE — DRAPE SHEET X-LG

## (undated) DEVICE — ARM DRAPE

## (undated) DEVICE — CANNULA SEAL

## (undated) DEVICE — SPONGE LAP 18 X 18 IN STRL RFD

## (undated) DEVICE — LUBRICANT SURGILUBE TUBE 4 OZ  FLIP TOP

## (undated) DEVICE — TIP COVER ACCESSORY

## (undated) DEVICE — SYRINGE 50ML LL

## (undated) DEVICE — TROCAR PORT ACCESS 12 X120MM W/BLDLS OPTICAL TIP AIRSEAL

## (undated) DEVICE — DRAPE FLUID WARMER (BIRD BATH)

## (undated) DEVICE — ASTOUND STANDARD SURGICAL GOWN, XL: Brand: CONVERTORS

## (undated) DEVICE — PLUMEPEN PRO 10FT

## (undated) DEVICE — FENESTRATED BIPOLAR FORCEPS: Brand: ENDOWRIST;DAVINCI SI

## (undated) DEVICE — NEEDLE SPINAL 22G X 3.5 IN PLST HUB

## (undated) DEVICE — GLOVE INDICATOR PI UNDERGLOVE SZ 7.5 BLUE

## (undated) DEVICE — NEEDLE COUNTER LG W/RULER

## (undated) DEVICE — OCCLUDER COLPO-PNEUMO

## (undated) DEVICE — DRAPE EQUIPMENT RF WAND

## (undated) DEVICE — HEMOSTATIC MATRIX SURGIFLO 8ML W/THROMBIN

## (undated) DEVICE — UNDER BUTTOCKS DRAPE W/FLUID CONTROL POUCH: Brand: CONVERTORS

## (undated) DEVICE — SUT MONOCRYL 4-0 PS-2 27 IN Y426H

## (undated) DEVICE — APPLICATOR ENDO SURGICEL

## (undated) DEVICE — BANDAID WATERPROOF 1-7/8 X 4

## (undated) DEVICE — VIAL DECANTER

## (undated) DEVICE — TROCAR: Brand: KII FIOS FIRST ENTRY

## (undated) DEVICE — LARGE NEEDLE DRIVER: Brand: ENDOWRIST;DAVINCI SI

## (undated) DEVICE — PVC URETHRAL CATHETER: Brand: DOVER

## (undated) DEVICE — PENCIL ELECTROSURG E-Z CLEAN -0035H

## (undated) DEVICE — 3M™ IOBAN™ 2 ANTIMICROBIAL INCISE DRAPE 6650EZ: Brand: IOBAN™ 2

## (undated) DEVICE — ROBOT ACCESSORY KIT 4 ARM

## (undated) DEVICE — INTENDED FOR TISSUE SEPARATION, AND OTHER PROCEDURES THAT REQUIRE A SHARP SURGICAL BLADE TO PUNCTURE OR CUT.: Brand: BARD-PARKER ® CARBON RIB-BACK BLADES

## (undated) DEVICE — SUT VICRYL 0 REEL 54 IN J287G

## (undated) DEVICE — TISSUE RETRIEVAL SYSTEM: Brand: INZII RETRIEVAL SYSTEM

## (undated) DEVICE — HEAVY DUTY TABLE COVER: Brand: CONVERTORS

## (undated) DEVICE — 1820 FOAM BLOCK NEEDLE COUNTER: Brand: DEVON

## (undated) DEVICE — SPECIMEN TRAP: Brand: ARGYLE

## (undated) DEVICE — LUBRICANT INST ELECTROLUBE ANTISTK WO PAD

## (undated) DEVICE — COLUMN DRAPE

## (undated) DEVICE — SUT VICRYL 2-0 D-SPECIAL 27 IN D8114

## (undated) DEVICE — IRRIG ENDO FLO TUBING

## (undated) DEVICE — SPONGE STICK WITH PVP-I: Brand: KENDALL

## (undated) DEVICE — INSUFFLATION TUBING PRIMFLO

## (undated) DEVICE — EXIDINE 4 PCT

## (undated) DEVICE — ADHESIVE SKIN HIGH VISCOSITY EXOFIN 1ML

## (undated) DEVICE — 4-PORT MANIFOLD: Brand: NEPTUNE 2

## (undated) DEVICE — SUT MONOCRYL 4-0 PS-2 18 IN Y496G

## (undated) DEVICE — INTENDED FOR TISSUE SEPARATION, AND OTHER PROCEDURES THAT REQUIRE A SHARP SURGICAL BLADE TO PUNCTURE OR CUT.: Brand: BARD-PARKER SAFETY BLADES SIZE 11, STERILE

## (undated) DEVICE — SYRINGE 10ML LL CONTROL TOP

## (undated) DEVICE — SUT PROLENE 3-0 SH 36 IN 8522H

## (undated) DEVICE — BIPOLAR CORD DISP

## (undated) DEVICE — INVIEW CLEAR LEGGINGS: Brand: CONVERTORS

## (undated) DEVICE — SUT SILK 3-0 18 IN A184H

## (undated) DEVICE — Device: Brand: TISSUE RETRIEVAL SYSTEM

## (undated) DEVICE — KIT, BETHLEHEM ROBOTIC PROST: Brand: CARDINAL HEALTH

## (undated) DEVICE — GLOVE INDICATOR PI UNDERGLOVE SZ 8.5 BLUE

## (undated) DEVICE — PROGRASP FORCEPS: Brand: ENDOWRIST

## (undated) DEVICE — STRL ALLENTOWN HYSTEROSCOPY PK: Brand: CARDINAL HEALTH

## (undated) DEVICE — ANTI-FOG SOLUTION WITH FOAM PAD: Brand: DEVON

## (undated) DEVICE — VESSEL SEALER: Brand: ENDOWRIST;DAVINCI SI

## (undated) DEVICE — SUT SILK 2-0 18 IN A185H

## (undated) DEVICE — UTERINE MANIPULATOR RUMI 6.7 X 8 CM

## (undated) DEVICE — DRAPE SHEET THREE QUARTER

## (undated) DEVICE — BETHLEHEM MAJOR GENERAL PACK: Brand: CARDINAL HEALTH

## (undated) DEVICE — [HIGH FLOW INSUFFLATOR,  DO NOT USE IF PACKAGE IS DAMAGED,  KEEP DRY,  KEEP AWAY FROM SUNLIGHT,  PROTECT FROM HEAT AND RADIOACTIVE SOURCES.]: Brand: PNEUMOSURE

## (undated) DEVICE — UTERINE MANIPULATOR RUMI 6.7 X 10 CM

## (undated) DEVICE — TOWEL SURG XR DETECT GREEN STRL RFD

## (undated) DEVICE — BASIC SINGLE BASIN 2-LF: Brand: MEDLINE INDUSTRIES, INC.

## (undated) DEVICE — INTENDED FOR TISSUE SEPARATION, AND OTHER PROCEDURES THAT REQUIRE A SHARP SURGICAL BLADE TO PUNCTURE OR CUT.: Brand: BARD-PARKER SAFETY BLADES SIZE 10, STERILE

## (undated) DEVICE — GLOVE INDICATOR PI UNDERGLOVE SZ 9 BLUE

## (undated) DEVICE — SURGIFLO ENDOSCOPIC APPICATOR: Brand: ETHICON

## (undated) DEVICE — STRL COTTON TIP APPLCTR 6IN PK: Brand: CARDINAL HEALTH

## (undated) DEVICE — GLOVE PI ULTRA TOUCH SZ.8.5

## (undated) DEVICE — MEDI-VAC YANK SUCT HNDL W/TPRD BULBOUS TIP: Brand: CARDINAL HEALTH

## (undated) DEVICE — ALLENTOWN LAP CHOLE APP PACK: Brand: CARDINAL HEALTH

## (undated) DEVICE — CHLORHEXIDINE 4PCT 4 OZ

## (undated) DEVICE — AIRSEAL TUBE SMOKE EVAC LUMENX3 FILTERED

## (undated) DEVICE — HEM-O-LOK CLIP CARTRIDGE MED/LARGE DA VINCI SI/XI

## (undated) DEVICE — SUT VICRYL 0 CT-1 CR/8 27 IN JJ41G

## (undated) DEVICE — PMI DISPOSABLE PUNCTURE CLOSURE DEVICE / SUTURE GRASPER: Brand: PMI

## (undated) DEVICE — MONOPOLAR CURVED SCISSORS: Brand: ENDOWRIST;DAVINCI SI

## (undated) DEVICE — TUBING SMOKE EVAC W/FILTRATION DEVICE PLUMEPORT ACTIV

## (undated) DEVICE — DRAPE LAPAROTOMY W/POUCHES

## (undated) DEVICE — ROBOT 8.5-13MM CANNULA SEALS

## (undated) DEVICE — MONOPOLAR CURVED SCISSORS: Brand: ENDOWRIST

## (undated) DEVICE — IMPERVIOUS SURGICAL GOWN, LG: Brand: CONVERTORS

## (undated) DEVICE — GAUZE SPONGES,16 PLY: Brand: CURITY

## (undated) DEVICE — GLOVE PI ULTRA TOUCH SZ.7.0

## (undated) DEVICE — X-RAY DETECTABLE SPONGES,16 PLY: Brand: VISTEC

## (undated) DEVICE — HEMOSTAT POWDER ADSORB SURGICEL 3GM

## (undated) DEVICE — SUT VICRYL PLUS 0 UR-6 27IN VCP603H